# Patient Record
Sex: FEMALE | Race: WHITE | NOT HISPANIC OR LATINO | Employment: UNEMPLOYED | URBAN - METROPOLITAN AREA
[De-identification: names, ages, dates, MRNs, and addresses within clinical notes are randomized per-mention and may not be internally consistent; named-entity substitution may affect disease eponyms.]

---

## 2016-12-19 LAB
EXTERNAL CHLAMYDIA SCREEN: NEGATIVE
EXTERNAL GONORRHEA SCREEN: NEGATIVE

## 2017-01-16 ENCOUNTER — GENERIC CONVERSION - ENCOUNTER (OUTPATIENT)
Dept: OTHER | Facility: OTHER | Age: 27
End: 2017-01-16

## 2017-01-16 ENCOUNTER — ALLSCRIPTS OFFICE VISIT (OUTPATIENT)
Dept: OTHER | Facility: OTHER | Age: 27
End: 2017-01-16

## 2017-02-07 LAB
EXTERNAL ABO GROUPING: NORMAL
EXTERNAL ABO GROUPING: NORMAL
EXTERNAL ANTIBODY SCREEN: NORMAL
EXTERNAL ANTIBODY SCREEN: NORMAL
EXTERNAL HEMATOCRIT: 37.7 %
EXTERNAL HEMOGLOBIN: 12.7 G/DL
EXTERNAL HEPATITIS B SURFACE ANTIGEN: NORMAL
EXTERNAL PLATELET COUNT: 247 K/ΜL
EXTERNAL RH FACTOR: POSITIVE
EXTERNAL RH FACTOR: POSITIVE
EXTERNAL RUBELLA IGG QUANTITATION: NORMAL
EXTERNAL SYPHILIS RPR SCREEN: NORMAL

## 2017-02-08 ENCOUNTER — LAB CONVERSION - ENCOUNTER (OUTPATIENT)
Dept: OTHER | Facility: OTHER | Age: 27
End: 2017-02-08

## 2017-02-08 LAB
AB SCRN, RBC W/RFLX ID,TITER,AG (HISTORICAL): ABNORMAL
ABO GROUP BLD: ABNORMAL
BASOPHILS # BLD AUTO: 0.6 %
BASOPHILS # BLD AUTO: 76 CELLS/UL (ref 0–200)
DEPRECATED RDW RBC AUTO: 13.5 % (ref 11–15)
EOSINOPHIL # BLD AUTO: 2.2 %
EOSINOPHIL # BLD AUTO: 277 CELLS/UL (ref 15–500)
HCT VFR BLD AUTO: 37.7 % (ref 35–45)
HEPATITIS B SURFACE ANTIGEN (HISTORICAL): ABNORMAL
HGB BLD-MCNC: 12.7 G/DL (ref 11.7–15.5)
LYMPHOCYTES # BLD AUTO: 13.8 %
LYMPHOCYTES # BLD AUTO: 1739 CELLS/UL (ref 850–3900)
Lab: NORMAL
Lab: NORMAL
MCH RBC QN AUTO: 30.5 PG (ref 27–33)
MCHC RBC AUTO-ENTMCNC: 33.7 G/DL (ref 32–36)
MCV RBC AUTO: 90.6 FL (ref 80–100)
MONOCYTES # BLD AUTO: 781 CELLS/UL (ref 200–950)
MONOCYTES (HISTORICAL): 6.2 %
NEUTROPHILS # BLD AUTO: 77.2 %
NEUTROPHILS # BLD AUTO: 9727 CELLS/UL (ref 1500–7800)
PLATELET # BLD AUTO: 247 THOUSAND/UL (ref 140–400)
PMV BLD AUTO: 8.6 FL (ref 7.5–12.5)
RBC # BLD AUTO: 4.16 MILLION/UL (ref 3.8–5.1)
RH BLD: ABNORMAL
RPR SCREEN (HISTORICAL): ABNORMAL
RUBELLA, IGG (HISTORICAL): 3.8
VARICELLA ZOSTER IGG (HISTORICAL): 1.11 INDEX
WBC # BLD AUTO: 12.6 THOUSAND/UL (ref 3.8–10.8)

## 2017-02-14 ENCOUNTER — GENERIC CONVERSION - ENCOUNTER (OUTPATIENT)
Dept: OTHER | Facility: OTHER | Age: 27
End: 2017-02-14

## 2017-03-10 ENCOUNTER — ALLSCRIPTS OFFICE VISIT (OUTPATIENT)
Dept: OTHER | Facility: OTHER | Age: 27
End: 2017-03-10

## 2017-03-10 ENCOUNTER — GENERIC CONVERSION - ENCOUNTER (OUTPATIENT)
Dept: OTHER | Facility: OTHER | Age: 27
End: 2017-03-10

## 2017-04-10 ENCOUNTER — GENERIC CONVERSION - ENCOUNTER (OUTPATIENT)
Dept: OTHER | Facility: OTHER | Age: 27
End: 2017-04-10

## 2017-05-01 ENCOUNTER — ALLSCRIPTS OFFICE VISIT (OUTPATIENT)
Dept: OTHER | Facility: OTHER | Age: 27
End: 2017-05-01

## 2017-05-09 LAB
EXTERNAL SYPHILIS RPR SCREEN: NORMAL
GLUCOSE 1H P 50 G GLC PO SERPL-MCNC: 126 MG/DL (ref 70–183)

## 2017-05-10 ENCOUNTER — GENERIC CONVERSION - ENCOUNTER (OUTPATIENT)
Dept: OTHER | Facility: OTHER | Age: 27
End: 2017-05-10

## 2017-05-10 LAB
DEPRECATED RDW RBC AUTO: 13.2 % (ref 11–15)
GLUCOSE 1 HR 50 GM GLUC CHALLENGE-PREG PTS (HISTORICAL): 126 MG/DL
HCT VFR BLD AUTO: 37 % (ref 35–45)
HGB BLD-MCNC: 12.1 G/DL (ref 11.7–15.5)
MCH RBC QN AUTO: 29.2 PG (ref 27–33)
MCHC RBC AUTO-ENTMCNC: 32.8 G/DL (ref 32–36)
MCV RBC AUTO: 89 FL (ref 80–100)
PLATELET # BLD AUTO: 278 THOUSAND/UL (ref 140–400)
PMV BLD AUTO: 7.5 FL (ref 7.5–12.5)
RBC # BLD AUTO: 4.15 MILLION/UL (ref 3.8–5.1)
RPR SCREEN (HISTORICAL): NORMAL
WBC # BLD AUTO: 12.2 THOUSAND/UL (ref 3.8–10.8)

## 2017-05-15 ENCOUNTER — GENERIC CONVERSION - ENCOUNTER (OUTPATIENT)
Dept: OTHER | Facility: OTHER | Age: 27
End: 2017-05-15

## 2017-05-30 ENCOUNTER — GENERIC CONVERSION - ENCOUNTER (OUTPATIENT)
Dept: OTHER | Facility: OTHER | Age: 27
End: 2017-05-30

## 2017-06-12 ENCOUNTER — GENERIC CONVERSION - ENCOUNTER (OUTPATIENT)
Dept: OTHER | Facility: OTHER | Age: 27
End: 2017-06-12

## 2017-06-12 ENCOUNTER — ALLSCRIPTS OFFICE VISIT (OUTPATIENT)
Dept: OTHER | Facility: OTHER | Age: 27
End: 2017-06-12

## 2017-06-12 LAB — EXTERNAL GROUP B STREP ANTIGEN: NEGATIVE

## 2017-06-13 ENCOUNTER — GENERIC CONVERSION - ENCOUNTER (OUTPATIENT)
Dept: OTHER | Facility: OTHER | Age: 27
End: 2017-06-13

## 2017-06-14 LAB — CULTURE GENITAL-BSB ON (HISTORICAL): NORMAL

## 2017-06-15 ENCOUNTER — GENERIC CONVERSION - ENCOUNTER (OUTPATIENT)
Dept: OTHER | Facility: OTHER | Age: 27
End: 2017-06-15

## 2017-06-20 ENCOUNTER — GENERIC CONVERSION - ENCOUNTER (OUTPATIENT)
Dept: OTHER | Facility: OTHER | Age: 27
End: 2017-06-20

## 2017-06-28 ENCOUNTER — GENERIC CONVERSION - ENCOUNTER (OUTPATIENT)
Dept: OTHER | Facility: OTHER | Age: 27
End: 2017-06-28

## 2017-06-30 ENCOUNTER — ALLSCRIPTS OFFICE VISIT (OUTPATIENT)
Dept: OTHER | Facility: OTHER | Age: 27
End: 2017-06-30

## 2017-07-05 ENCOUNTER — GENERIC CONVERSION - ENCOUNTER (OUTPATIENT)
Dept: OTHER | Facility: OTHER | Age: 27
End: 2017-07-05

## 2017-07-12 ENCOUNTER — GENERIC CONVERSION - ENCOUNTER (OUTPATIENT)
Dept: OTHER | Facility: OTHER | Age: 27
End: 2017-07-12

## 2017-07-17 ENCOUNTER — ALLSCRIPTS OFFICE VISIT (OUTPATIENT)
Dept: OTHER | Facility: OTHER | Age: 27
End: 2017-07-17

## 2017-07-18 ENCOUNTER — GENERIC CONVERSION - ENCOUNTER (OUTPATIENT)
Dept: OTHER | Facility: OTHER | Age: 27
End: 2017-07-18

## 2017-07-21 ENCOUNTER — HOSPITAL ENCOUNTER (OUTPATIENT)
Facility: HOSPITAL | Age: 27
Discharge: HOME | End: 2017-07-22
Attending: OBSTETRICS & GYNECOLOGY | Admitting: OBSTETRICS & GYNECOLOGY

## 2017-07-21 VITALS
SYSTOLIC BLOOD PRESSURE: 136 MMHG | HEART RATE: 83 BPM | RESPIRATION RATE: 18 BRPM | TEMPERATURE: 97.8 F | DIASTOLIC BLOOD PRESSURE: 74 MMHG

## 2017-07-21 RX ORDER — PNV NO.95/FERROUS FUM/FOLIC AC 28MG-0.8MG
1 TABLET ORAL DAILY
COMMUNITY
End: 2018-03-14

## 2017-07-21 RX ORDER — HYDROXYZINE 50 MG/1
1 TABLET, FILM COATED ORAL EVERY 6 HOURS
COMMUNITY
Start: 2017-07-18 | End: 2018-03-14

## 2017-07-21 RX ORDER — CLOBETASOL PROPIONATE 0.5 MG/G
1 CREAM TOPICAL 2 TIMES DAILY
COMMUNITY
Start: 2017-07-18 | End: 2018-03-14

## 2017-07-22 ENCOUNTER — GENERIC CONVERSION - ENCOUNTER (OUTPATIENT)
Dept: OTHER | Facility: OTHER | Age: 27
End: 2017-07-22

## 2017-07-22 PROCEDURE — 99213 OFFICE O/P EST LOW 20 MIN: CPT

## 2017-07-22 RX ORDER — HYDROXYZINE HYDROCHLORIDE 25 MG/1
50 TABLET, FILM COATED ORAL ONCE AS NEEDED
Status: DISCONTINUED | OUTPATIENT
Start: 2017-07-22 | End: 2017-07-22 | Stop reason: HOSPADM

## 2017-07-23 ENCOUNTER — ANESTHESIA (INPATIENT)
Dept: LABOR AND DELIVERY | Facility: HOSPITAL | Age: 27
DRG: 540 | End: 2017-07-23
Payer: COMMERCIAL

## 2017-07-23 ENCOUNTER — HOSPITAL ENCOUNTER (INPATIENT)
Facility: HOSPITAL | Age: 27
LOS: 4 days | Discharge: HOME | DRG: 540 | End: 2017-07-27
Attending: OBSTETRICS & GYNECOLOGY | Admitting: OBSTETRICS & GYNECOLOGY
Payer: COMMERCIAL

## 2017-07-23 PROBLEM — Z3A.40 40 WEEKS GESTATION OF PREGNANCY: Status: ACTIVE | Noted: 2017-07-23

## 2017-07-23 PROBLEM — Z34.90 NORMAL PREGNANCY: Status: ACTIVE | Noted: 2017-07-23

## 2017-07-23 PROBLEM — O26.86: Status: ACTIVE | Noted: 2017-07-23

## 2017-07-23 PROBLEM — O26.00 EXCESSIVE WEIGHT GAIN DURING PREGNANCY: Status: ACTIVE | Noted: 2017-07-23

## 2017-07-23 LAB
ABO GROUP BLD: NORMAL
BLD GP AB SCN SERPL QL: NEGATIVE
ERYTHROCYTE [DISTWIDTH] IN BLOOD BY AUTOMATED COUNT: 15.3 % (ref 11.6–15.1)
HCT VFR BLD AUTO: 40.4 % (ref 34.8–46.1)
HGB BLD-MCNC: 13.8 G/DL (ref 11.5–15.4)
MCH RBC QN AUTO: 29.6 PG (ref 26.8–34.3)
MCHC RBC AUTO-ENTMCNC: 34.2 G/DL (ref 31.4–37.4)
MCV RBC AUTO: 87 FL (ref 82–98)
PLATELET # BLD AUTO: 281 THOUSANDS/UL (ref 149–390)
PMV BLD AUTO: 10.5 FL (ref 8.9–12.7)
RBC # BLD AUTO: 4.66 MILLION/UL (ref 3.81–5.12)
RH BLD: POSITIVE
SPECIMEN EXPIRATION DATE: NORMAL
WBC # BLD AUTO: 17.96 THOUSAND/UL (ref 4.31–10.16)

## 2017-07-23 PROCEDURE — 86901 BLOOD TYPING SEROLOGIC RH(D): CPT | Performed by: OBSTETRICS & GYNECOLOGY

## 2017-07-23 PROCEDURE — 86592 SYPHILIS TEST NON-TREP QUAL: CPT | Performed by: OBSTETRICS & GYNECOLOGY

## 2017-07-23 PROCEDURE — 86900 BLOOD TYPING SEROLOGIC ABO: CPT | Performed by: OBSTETRICS & GYNECOLOGY

## 2017-07-23 PROCEDURE — 99212 OFFICE O/P EST SF 10 MIN: CPT

## 2017-07-23 PROCEDURE — 86850 RBC ANTIBODY SCREEN: CPT | Performed by: OBSTETRICS & GYNECOLOGY

## 2017-07-23 PROCEDURE — 85027 COMPLETE CBC AUTOMATED: CPT | Performed by: OBSTETRICS & GYNECOLOGY

## 2017-07-23 RX ORDER — ONDANSETRON 2 MG/ML
4 INJECTION INTRAMUSCULAR; INTRAVENOUS EVERY 4 HOURS PRN
Status: DISCONTINUED | OUTPATIENT
Start: 2017-07-23 | End: 2017-07-27 | Stop reason: HOSPADM

## 2017-07-23 RX ORDER — SODIUM CHLORIDE, SODIUM LACTATE, POTASSIUM CHLORIDE, CALCIUM CHLORIDE 600; 310; 30; 20 MG/100ML; MG/100ML; MG/100ML; MG/100ML
125 INJECTION, SOLUTION INTRAVENOUS CONTINUOUS
Status: DISCONTINUED | OUTPATIENT
Start: 2017-07-23 | End: 2017-07-24

## 2017-07-23 RX ORDER — DIPHENHYDRAMINE HYDROCHLORIDE 50 MG/ML
25 INJECTION INTRAMUSCULAR; INTRAVENOUS EVERY 6 HOURS PRN
Status: DISCONTINUED | OUTPATIENT
Start: 2017-07-23 | End: 2017-07-26

## 2017-07-23 RX ORDER — LIDOCAINE HYDROCHLORIDE AND EPINEPHRINE 15; 5 MG/ML; UG/ML
INJECTION, SOLUTION EPIDURAL AS NEEDED
Status: DISCONTINUED | OUTPATIENT
Start: 2017-07-23 | End: 2017-07-24 | Stop reason: SURG

## 2017-07-23 RX ORDER — TERBUTALINE SULFATE 1 MG/ML
INJECTION, SOLUTION SUBCUTANEOUS
Status: COMPLETED
Start: 2017-07-23 | End: 2017-07-23

## 2017-07-23 RX ORDER — ONDANSETRON 2 MG/ML
4 INJECTION INTRAMUSCULAR; INTRAVENOUS EVERY 6 HOURS PRN
Status: DISCONTINUED | OUTPATIENT
Start: 2017-07-23 | End: 2017-07-24

## 2017-07-23 RX ADMIN — LIDOCAINE HYDROCHLORIDE AND EPINEPHRINE 5 ML: 15; 5 INJECTION, SOLUTION EPIDURAL at 19:12

## 2017-07-23 RX ADMIN — SODIUM CHLORIDE, SODIUM LACTATE, POTASSIUM CHLORIDE, AND CALCIUM CHLORIDE 999 ML/HR: .6; .31; .03; .02 INJECTION, SOLUTION INTRAVENOUS at 17:32

## 2017-07-23 RX ADMIN — SODIUM CHLORIDE, SODIUM LACTATE, POTASSIUM CHLORIDE, AND CALCIUM CHLORIDE 125 ML/HR: .6; .31; .03; .02 INJECTION, SOLUTION INTRAVENOUS at 18:30

## 2017-07-23 RX ADMIN — TERBUTALINE SULFATE 0.2 MG: 1 INJECTION, SOLUTION SUBCUTANEOUS at 19:47

## 2017-07-23 RX ADMIN — ROPIVACAINE HYDROCHLORIDE: 2 INJECTION, SOLUTION EPIDURAL; INFILTRATION at 19:22

## 2017-07-23 RX ADMIN — SODIUM CHLORIDE, SODIUM LACTATE, POTASSIUM CHLORIDE, AND CALCIUM CHLORIDE 999 ML/HR: .6; .31; .03; .02 INJECTION, SOLUTION INTRAVENOUS at 20:31

## 2017-07-24 ENCOUNTER — GENERIC CONVERSION - ENCOUNTER (OUTPATIENT)
Dept: OTHER | Facility: OTHER | Age: 27
End: 2017-07-24

## 2017-07-24 LAB
BASE EXCESS BLDCOA CALC-SCNC: -23 MMOL/L (ref 3–11)
BASE EXCESS BLDCOV CALC-SCNC: -16 MMOL/L (ref 1–9)
HCO3 BLDCOA-SCNC: 16.7 MMOL/L (ref 17.3–27.3)
HCO3 BLDCOV-SCNC: 16.7 MMOL/L (ref 12.2–28.6)
O2 CT VFR BLDCOA CALC: 3.3 ML/DL
OXYHGB MFR BLDCOA: 12.2 %
OXYHGB MFR BLDCOV: 54.7 %
PCO2 BLDCOA: 125.9 MM[HG] (ref 30–60)
PCO2 BLDCOV: 67.1 MM HG (ref 27–43)
PH BLDCOA: 6.74 [PH] (ref 7.23–7.43)
PH BLDCOV: 7.01 [PH] (ref 7.19–7.49)
PO2 BLDCOA: 18.7 MM HG (ref 5–25)
PO2 BLDCOV: 38.2 MM HG (ref 15–45)
RPR SER QL: NORMAL
SAO2 % BLDCOV: 15.8 ML/DL

## 2017-07-24 PROCEDURE — 82805 BLOOD GASES W/O2 SATURATION: CPT | Performed by: OBSTETRICS & GYNECOLOGY

## 2017-07-24 PROCEDURE — 88307 TISSUE EXAM BY PATHOLOGIST: CPT | Performed by: OBSTETRICS & GYNECOLOGY

## 2017-07-24 RX ORDER — KETOROLAC TROMETHAMINE 30 MG/ML
INJECTION, SOLUTION INTRAMUSCULAR; INTRAVENOUS AS NEEDED
Status: DISCONTINUED | OUTPATIENT
Start: 2017-07-24 | End: 2017-07-24 | Stop reason: SURG

## 2017-07-24 RX ORDER — FENTANYL CITRATE 50 UG/ML
INJECTION, SOLUTION INTRAMUSCULAR; INTRAVENOUS
Status: COMPLETED
Start: 2017-07-24 | End: 2017-07-24

## 2017-07-24 RX ORDER — ONDANSETRON 2 MG/ML
4 INJECTION INTRAMUSCULAR; INTRAVENOUS EVERY 4 HOURS PRN
Status: DISCONTINUED | OUTPATIENT
Start: 2017-07-24 | End: 2017-07-24 | Stop reason: SDUPTHER

## 2017-07-24 RX ORDER — FENTANYL CITRATE/PF 50 MCG/ML
50 SYRINGE (ML) INJECTION
Status: DISCONTINUED | OUTPATIENT
Start: 2017-07-24 | End: 2017-07-27 | Stop reason: HOSPADM

## 2017-07-24 RX ORDER — ONDANSETRON 2 MG/ML
4 INJECTION INTRAMUSCULAR; INTRAVENOUS EVERY 4 HOURS PRN
Status: ACTIVE | OUTPATIENT
Start: 2017-07-24 | End: 2017-07-25

## 2017-07-24 RX ORDER — DIPHENHYDRAMINE HYDROCHLORIDE 50 MG/ML
25 INJECTION INTRAMUSCULAR; INTRAVENOUS EVERY 6 HOURS PRN
Status: DISCONTINUED | OUTPATIENT
Start: 2017-07-24 | End: 2017-07-24 | Stop reason: SDUPTHER

## 2017-07-24 RX ORDER — OXYCODONE HYDROCHLORIDE 5 MG/1
5 TABLET ORAL EVERY 4 HOURS PRN
Status: ACTIVE | OUTPATIENT
Start: 2017-07-24 | End: 2017-07-25

## 2017-07-24 RX ORDER — DOCUSATE SODIUM 100 MG/1
100 CAPSULE, LIQUID FILLED ORAL 2 TIMES DAILY
Status: DISCONTINUED | OUTPATIENT
Start: 2017-07-24 | End: 2017-07-25

## 2017-07-24 RX ORDER — METOCLOPRAMIDE HYDROCHLORIDE 5 MG/ML
5 INJECTION INTRAMUSCULAR; INTRAVENOUS EVERY 6 HOURS PRN
Status: ACTIVE | OUTPATIENT
Start: 2017-07-24 | End: 2017-07-25

## 2017-07-24 RX ORDER — SODIUM CHLORIDE, SODIUM LACTATE, POTASSIUM CHLORIDE, CALCIUM CHLORIDE 600; 310; 30; 20 MG/100ML; MG/100ML; MG/100ML; MG/100ML
125 INJECTION, SOLUTION INTRAVENOUS CONTINUOUS
Status: DISCONTINUED | OUTPATIENT
Start: 2017-07-24 | End: 2017-07-27 | Stop reason: HOSPADM

## 2017-07-24 RX ORDER — METOCLOPRAMIDE HYDROCHLORIDE 5 MG/ML
5 INJECTION INTRAMUSCULAR; INTRAVENOUS EVERY 6 HOURS PRN
Status: DISCONTINUED | OUTPATIENT
Start: 2017-07-24 | End: 2017-07-24 | Stop reason: SDUPTHER

## 2017-07-24 RX ORDER — NALBUPHINE HCL 10 MG/ML
5 AMPUL (ML) INJECTION
Status: ACTIVE | OUTPATIENT
Start: 2017-07-24 | End: 2017-07-25

## 2017-07-24 RX ORDER — ONDANSETRON 2 MG/ML
4 INJECTION INTRAMUSCULAR; INTRAVENOUS EVERY 8 HOURS PRN
Status: DISCONTINUED | OUTPATIENT
Start: 2017-07-25 | End: 2017-07-27 | Stop reason: HOSPADM

## 2017-07-24 RX ORDER — OXYCODONE HYDROCHLORIDE AND ACETAMINOPHEN 5; 325 MG/1; MG/1
1 TABLET ORAL EVERY 4 HOURS PRN
Status: DISCONTINUED | OUTPATIENT
Start: 2017-07-25 | End: 2017-07-27 | Stop reason: HOSPADM

## 2017-07-24 RX ORDER — MEPERIDINE HYDROCHLORIDE 50 MG/ML
12.5 INJECTION INTRAMUSCULAR; INTRAVENOUS; SUBCUTANEOUS AS NEEDED
Status: DISCONTINUED | OUTPATIENT
Start: 2017-07-24 | End: 2017-07-27 | Stop reason: HOSPADM

## 2017-07-24 RX ORDER — MORPHINE SULFATE 2 MG/ML
2 INJECTION, SOLUTION INTRAMUSCULAR; INTRAVENOUS
Status: DISCONTINUED | OUTPATIENT
Start: 2017-07-24 | End: 2017-07-27 | Stop reason: HOSPADM

## 2017-07-24 RX ORDER — METHYLERGONOVINE MALEATE 0.2 MG/ML
INJECTION INTRAVENOUS
Status: DISPENSED
Start: 2017-07-24 | End: 2017-07-24

## 2017-07-24 RX ORDER — SODIUM CHLORIDE 9 MG/ML
INJECTION, SOLUTION INTRAVENOUS CONTINUOUS PRN
Status: DISCONTINUED | OUTPATIENT
Start: 2017-07-24 | End: 2017-07-24 | Stop reason: SURG

## 2017-07-24 RX ORDER — MORPHINE SULFATE 1 MG/ML
INJECTION, SOLUTION EPIDURAL; INTRATHECAL; INTRAVENOUS AS NEEDED
Status: DISCONTINUED | OUTPATIENT
Start: 2017-07-24 | End: 2017-07-24 | Stop reason: SURG

## 2017-07-24 RX ORDER — CEFAZOLIN SODIUM 1 G/3ML
INJECTION, POWDER, FOR SOLUTION INTRAMUSCULAR; INTRAVENOUS AS NEEDED
Status: DISCONTINUED | OUTPATIENT
Start: 2017-07-24 | End: 2017-07-24 | Stop reason: SURG

## 2017-07-24 RX ORDER — OXYCODONE HYDROCHLORIDE AND ACETAMINOPHEN 5; 325 MG/1; MG/1
2 TABLET ORAL EVERY 4 HOURS PRN
Status: DISCONTINUED | OUTPATIENT
Start: 2017-07-25 | End: 2017-07-27 | Stop reason: HOSPADM

## 2017-07-24 RX ORDER — HYDROXYZINE HYDROCHLORIDE 25 MG/1
50 TABLET, FILM COATED ORAL EVERY 6 HOURS
Status: DISCONTINUED | OUTPATIENT
Start: 2017-07-24 | End: 2017-07-24

## 2017-07-24 RX ORDER — OXYTOCIN/RINGER'S LACTATE 30/500 ML
PLASTIC BAG, INJECTION (ML) INTRAVENOUS
Status: COMPLETED
Start: 2017-07-24 | End: 2017-07-24

## 2017-07-24 RX ORDER — DIPHENHYDRAMINE HCL 25 MG
25 TABLET ORAL EVERY 6 HOURS PRN
Status: DISCONTINUED | OUTPATIENT
Start: 2017-07-24 | End: 2017-07-27 | Stop reason: HOSPADM

## 2017-07-24 RX ORDER — IBUPROFEN 600 MG/1
600 TABLET ORAL EVERY 6 HOURS PRN
Status: DISCONTINUED | OUTPATIENT
Start: 2017-07-24 | End: 2017-07-27 | Stop reason: HOSPADM

## 2017-07-24 RX ORDER — DIAPER,BRIEF,INFANT-TODD,DISP
1 EACH MISCELLANEOUS DAILY PRN
Status: DISCONTINUED | OUTPATIENT
Start: 2017-07-24 | End: 2017-07-27 | Stop reason: HOSPADM

## 2017-07-24 RX ORDER — DIPHENHYDRAMINE HYDROCHLORIDE 50 MG/ML
25 INJECTION INTRAMUSCULAR; INTRAVENOUS EVERY 6 HOURS PRN
Status: ACTIVE | OUTPATIENT
Start: 2017-07-24 | End: 2017-07-25

## 2017-07-24 RX ORDER — FENTANYL CITRATE 50 UG/ML
INJECTION, SOLUTION INTRAMUSCULAR; INTRAVENOUS AS NEEDED
Status: DISCONTINUED | OUTPATIENT
Start: 2017-07-24 | End: 2017-07-24 | Stop reason: SURG

## 2017-07-24 RX ORDER — KETOROLAC TROMETHAMINE 30 MG/ML
30 INJECTION, SOLUTION INTRAMUSCULAR; INTRAVENOUS EVERY 6 HOURS
Status: COMPLETED | OUTPATIENT
Start: 2017-07-24 | End: 2017-07-25

## 2017-07-24 RX ORDER — NALOXONE HYDROCHLORIDE 0.4 MG/ML
0.1 INJECTION, SOLUTION INTRAMUSCULAR; INTRAVENOUS; SUBCUTANEOUS
Status: ACTIVE | OUTPATIENT
Start: 2017-07-24 | End: 2017-07-25

## 2017-07-24 RX ORDER — ACETAMINOPHEN 325 MG/1
650 TABLET ORAL EVERY 6 HOURS PRN
Status: DISCONTINUED | OUTPATIENT
Start: 2017-07-24 | End: 2017-07-27 | Stop reason: HOSPADM

## 2017-07-24 RX ORDER — NALBUPHINE HCL 10 MG/ML
5 AMPUL (ML) INJECTION
Status: DISCONTINUED | OUTPATIENT
Start: 2017-07-24 | End: 2017-07-24 | Stop reason: SDUPTHER

## 2017-07-24 RX ORDER — CHLOROPROCAINE HYDROCHLORIDE 30 MG/ML
INJECTION, SOLUTION EPIDURAL; INFILTRATION; INTRACAUDAL; PERINEURAL AS NEEDED
Status: DISCONTINUED | OUTPATIENT
Start: 2017-07-24 | End: 2017-07-24 | Stop reason: SURG

## 2017-07-24 RX ORDER — MORPHINE SULFATE 1 MG/ML
INJECTION, SOLUTION EPIDURAL; INTRATHECAL; INTRAVENOUS
Status: DISPENSED
Start: 2017-07-24 | End: 2017-07-24

## 2017-07-24 RX ORDER — OXYTOCIN/RINGER'S LACTATE 30/500 ML
62.5 PLASTIC BAG, INJECTION (ML) INTRAVENOUS ONCE
Status: COMPLETED | OUTPATIENT
Start: 2017-07-24 | End: 2017-07-24

## 2017-07-24 RX ORDER — PROPOFOL 10 MG/ML
INJECTION, EMULSION INTRAVENOUS AS NEEDED
Status: DISCONTINUED | OUTPATIENT
Start: 2017-07-24 | End: 2017-07-24 | Stop reason: SURG

## 2017-07-24 RX ADMIN — KETOROLAC TROMETHAMINE 30 MG: 30 INJECTION, SOLUTION INTRAMUSCULAR at 06:27

## 2017-07-24 RX ADMIN — FENTANYL CITRATE 50 MCG: 50 INJECTION INTRAMUSCULAR; INTRAVENOUS at 07:45

## 2017-07-24 RX ADMIN — Medication 62.5 MILLI-UNITS/MIN: at 07:30

## 2017-07-24 RX ADMIN — Medication 50 MCG: at 07:20

## 2017-07-24 RX ADMIN — KETOROLAC TROMETHAMINE 30 MG: 30 INJECTION, SOLUTION INTRAMUSCULAR at 18:42

## 2017-07-24 RX ADMIN — Medication 50 MCG: at 07:45

## 2017-07-24 RX ADMIN — SODIUM CHLORIDE: 0.9 INJECTION, SOLUTION INTRAVENOUS at 06:09

## 2017-07-24 RX ADMIN — ROPIVACAINE HYDROCHLORIDE: 2 INJECTION, SOLUTION EPIDURAL; INFILTRATION at 03:05

## 2017-07-24 RX ADMIN — MORPHINE SULFATE 3 MG: 1 INJECTION, SOLUTION EPIDURAL; INTRATHECAL; INTRAVENOUS at 06:37

## 2017-07-24 RX ADMIN — KETOROLAC TROMETHAMINE 30 MG: 30 INJECTION, SOLUTION INTRAMUSCULAR at 13:43

## 2017-07-24 RX ADMIN — OXYTOCIN 250 MILLI-UNITS/MIN: 10 INJECTION, SOLUTION INTRAMUSCULAR; INTRAVENOUS at 06:01

## 2017-07-24 RX ADMIN — CHLOROPROCAINE HYDROCHLORIDE 5 ML: 30 INJECTION, SOLUTION EPIDURAL; INFILTRATION at 06:16

## 2017-07-24 RX ADMIN — DOCUSATE SODIUM 100 MG: 100 CAPSULE, LIQUID FILLED ORAL at 18:42

## 2017-07-24 RX ADMIN — AZITHROMYCIN MONOHYDRATE 500 MG: 500 INJECTION, POWDER, LYOPHILIZED, FOR SOLUTION INTRAVENOUS at 10:47

## 2017-07-24 RX ADMIN — MORPHINE SULFATE 3 MG: 1 INJECTION, SOLUTION EPIDURAL; INTRATHECAL; INTRAVENOUS at 06:26

## 2017-07-24 RX ADMIN — FENTANYL CITRATE 50 MCG: 50 INJECTION INTRAMUSCULAR; INTRAVENOUS at 07:20

## 2017-07-24 RX ADMIN — MORPHINE SULFATE 4 MG: 1 INJECTION, SOLUTION EPIDURAL; INTRATHECAL; INTRAVENOUS at 06:29

## 2017-07-24 RX ADMIN — ONDANSETRON HYDROCHLORIDE 4 MG: 2 INJECTION, SOLUTION INTRAVENOUS at 06:27

## 2017-07-24 RX ADMIN — CHLOROPROCAINE HYDROCHLORIDE 15 ML: 30 INJECTION, SOLUTION EPIDURAL; INFILTRATION at 05:29

## 2017-07-24 RX ADMIN — PROPOFOL 50 MG: 10 INJECTION, EMULSION INTRAVENOUS at 05:58

## 2017-07-24 RX ADMIN — SODIUM CHLORIDE, SODIUM LACTATE, POTASSIUM CHLORIDE, AND CALCIUM CHLORIDE 125 ML/HR: .6; .31; .03; .02 INJECTION, SOLUTION INTRAVENOUS at 04:10

## 2017-07-24 RX ADMIN — SODIUM CHLORIDE, SODIUM LACTATE, POTASSIUM CHLORIDE, AND CALCIUM CHLORIDE 125 ML/HR: .6; .31; .03; .02 INJECTION, SOLUTION INTRAVENOUS at 17:20

## 2017-07-24 RX ADMIN — CHLOROPROCAINE HYDROCHLORIDE 10 ML: 30 INJECTION, SOLUTION EPIDURAL; INFILTRATION at 06:01

## 2017-07-24 RX ADMIN — SODIUM CHLORIDE, SODIUM LACTATE, POTASSIUM CHLORIDE, AND CALCIUM CHLORIDE 125 ML/HR: .6; .31; .03; .02 INJECTION, SOLUTION INTRAVENOUS at 08:16

## 2017-07-24 RX ADMIN — FENTANYL CITRATE 100 MCG: 50 INJECTION, SOLUTION INTRAMUSCULAR; INTRAVENOUS at 05:57

## 2017-07-24 RX ADMIN — CEFAZOLIN 2000 MG: 1 INJECTION, POWDER, FOR SOLUTION INTRAVENOUS at 05:52

## 2017-07-25 LAB
BASOPHILS # BLD AUTO: 0.03 THOUSANDS/ΜL (ref 0–0.1)
BASOPHILS NFR BLD AUTO: 0 % (ref 0–1)
EOSINOPHIL # BLD AUTO: 0.58 THOUSAND/ΜL (ref 0–0.61)
EOSINOPHIL NFR BLD AUTO: 3 % (ref 0–6)
ERYTHROCYTE [DISTWIDTH] IN BLOOD BY AUTOMATED COUNT: 15.7 % (ref 11.6–15.1)
HCT VFR BLD AUTO: 29.2 % (ref 34.8–46.1)
HGB BLD-MCNC: 9.8 G/DL (ref 11.5–15.4)
LYMPHOCYTES # BLD AUTO: 2.24 THOUSANDS/ΜL (ref 0.6–4.47)
LYMPHOCYTES NFR BLD AUTO: 11 % (ref 14–44)
MCH RBC QN AUTO: 29.3 PG (ref 26.8–34.3)
MCHC RBC AUTO-ENTMCNC: 33.6 G/DL (ref 31.4–37.4)
MCV RBC AUTO: 87 FL (ref 82–98)
MONOCYTES # BLD AUTO: 1.34 THOUSAND/ΜL (ref 0.17–1.22)
MONOCYTES NFR BLD AUTO: 7 % (ref 4–12)
NEUTROPHILS # BLD AUTO: 16.31 THOUSANDS/ΜL (ref 1.85–7.62)
NEUTS SEG NFR BLD AUTO: 79 % (ref 43–75)
NRBC BLD AUTO-RTO: 0 /100 WBCS
PLATELET # BLD AUTO: 233 THOUSANDS/UL (ref 149–390)
PMV BLD AUTO: 10.1 FL (ref 8.9–12.7)
RBC # BLD AUTO: 3.35 MILLION/UL (ref 3.81–5.12)
WBC # BLD AUTO: 20.5 THOUSAND/UL (ref 4.31–10.16)

## 2017-07-25 PROCEDURE — 85025 COMPLETE CBC W/AUTO DIFF WBC: CPT | Performed by: OBSTETRICS & GYNECOLOGY

## 2017-07-25 RX ORDER — SENNOSIDES 8.6 MG
1 TABLET ORAL
Status: DISCONTINUED | OUTPATIENT
Start: 2017-07-25 | End: 2017-07-27 | Stop reason: HOSPADM

## 2017-07-25 RX ADMIN — KETOROLAC TROMETHAMINE 30 MG: 30 INJECTION, SOLUTION INTRAMUSCULAR at 09:56

## 2017-07-25 RX ADMIN — HYDROMORPHONE HYDROCHLORIDE 0.5 MG: 1 INJECTION, SOLUTION INTRAMUSCULAR; INTRAVENOUS; SUBCUTANEOUS at 07:05

## 2017-07-25 RX ADMIN — HYDROCORTISONE 1 APPLICATION: 1 CREAM TOPICAL at 18:09

## 2017-07-25 RX ADMIN — DOCUSATE SODIUM 100 MG: 100 CAPSULE, LIQUID FILLED ORAL at 09:56

## 2017-07-25 RX ADMIN — OXYCODONE HYDROCHLORIDE AND ACETAMINOPHEN 2 TABLET: 5; 325 TABLET ORAL at 20:11

## 2017-07-25 RX ADMIN — KETOROLAC TROMETHAMINE 30 MG: 30 INJECTION, SOLUTION INTRAMUSCULAR at 02:59

## 2017-07-25 RX ADMIN — Medication 1 TABLET: at 09:56

## 2017-07-25 RX ADMIN — OXYCODONE HYDROCHLORIDE AND ACETAMINOPHEN 2 TABLET: 5; 325 TABLET ORAL at 12:37

## 2017-07-25 RX ADMIN — DIPHENHYDRAMINE HCL 25 MG: 25 TABLET ORAL at 18:09

## 2017-07-26 LAB
BASOPHILS # BLD AUTO: 0.03 THOUSANDS/ΜL (ref 0–0.1)
BASOPHILS NFR BLD AUTO: 0 % (ref 0–1)
EOSINOPHIL # BLD AUTO: 0.73 THOUSAND/ΜL (ref 0–0.61)
EOSINOPHIL NFR BLD AUTO: 5 % (ref 0–6)
ERYTHROCYTE [DISTWIDTH] IN BLOOD BY AUTOMATED COUNT: 15.4 % (ref 11.6–15.1)
HCT VFR BLD AUTO: 29.9 % (ref 34.8–46.1)
HGB BLD-MCNC: 10 G/DL (ref 11.5–15.4)
LYMPHOCYTES # BLD AUTO: 2.04 THOUSANDS/ΜL (ref 0.6–4.47)
LYMPHOCYTES NFR BLD AUTO: 14 % (ref 14–44)
MCH RBC QN AUTO: 29.3 PG (ref 26.8–34.3)
MCHC RBC AUTO-ENTMCNC: 33.4 G/DL (ref 31.4–37.4)
MCV RBC AUTO: 88 FL (ref 82–98)
MONOCYTES # BLD AUTO: 0.64 THOUSAND/ΜL (ref 0.17–1.22)
MONOCYTES NFR BLD AUTO: 4 % (ref 4–12)
NEUTROPHILS # BLD AUTO: 11.54 THOUSANDS/ΜL (ref 1.85–7.62)
NEUTS SEG NFR BLD AUTO: 77 % (ref 43–75)
NRBC BLD AUTO-RTO: 0 /100 WBCS
PLATELET # BLD AUTO: 289 THOUSANDS/UL (ref 149–390)
PMV BLD AUTO: 9.4 FL (ref 8.9–12.7)
RBC # BLD AUTO: 3.41 MILLION/UL (ref 3.81–5.12)
WBC # BLD AUTO: 14.98 THOUSAND/UL (ref 4.31–10.16)

## 2017-07-26 PROCEDURE — 85025 COMPLETE CBC W/AUTO DIFF WBC: CPT | Performed by: OBSTETRICS & GYNECOLOGY

## 2017-07-26 RX ORDER — LORATADINE 10 MG/1
10 TABLET ORAL 2 TIMES DAILY
Status: DISCONTINUED | OUTPATIENT
Start: 2017-07-26 | End: 2017-07-27 | Stop reason: HOSPADM

## 2017-07-26 RX ORDER — LORAZEPAM 0.5 MG/1
0.5 TABLET ORAL ONCE
Status: COMPLETED | OUTPATIENT
Start: 2017-07-26 | End: 2017-07-26

## 2017-07-26 RX ADMIN — DIPHENHYDRAMINE HCL 25 MG: 25 TABLET ORAL at 15:41

## 2017-07-26 RX ADMIN — LORAZEPAM 0.5 MG: 0.5 TABLET ORAL at 16:07

## 2017-07-26 RX ADMIN — Medication 1 TABLET: at 07:41

## 2017-07-26 RX ADMIN — IBUPROFEN 600 MG: 600 TABLET, FILM COATED ORAL at 13:42

## 2017-07-26 RX ADMIN — IBUPROFEN 600 MG: 600 TABLET, FILM COATED ORAL at 07:38

## 2017-07-26 RX ADMIN — OXYCODONE HYDROCHLORIDE AND ACETAMINOPHEN 2 TABLET: 5; 325 TABLET ORAL at 13:42

## 2017-07-26 RX ADMIN — SENNOSIDES 8.6 MG: 8.6 TABLET, FILM COATED ORAL at 02:10

## 2017-07-26 RX ADMIN — IBUPROFEN 600 MG: 600 TABLET, FILM COATED ORAL at 21:25

## 2017-07-26 RX ADMIN — DIPHENHYDRAMINE HCL 25 MG: 25 TABLET ORAL at 05:40

## 2017-07-26 RX ADMIN — LORATADINE 10 MG: 10 TABLET ORAL at 18:43

## 2017-07-26 RX ADMIN — OXYCODONE HYDROCHLORIDE AND ACETAMINOPHEN 2 TABLET: 5; 325 TABLET ORAL at 01:57

## 2017-07-26 RX ADMIN — OXYCODONE HYDROCHLORIDE AND ACETAMINOPHEN 2 TABLET: 5; 325 TABLET ORAL at 07:38

## 2017-07-26 RX ADMIN — SENNOSIDES 8.6 MG: 8.6 TABLET, FILM COATED ORAL at 21:24

## 2017-07-26 RX ADMIN — OXYCODONE HYDROCHLORIDE AND ACETAMINOPHEN 2 TABLET: 5; 325 TABLET ORAL at 21:24

## 2017-07-27 VITALS
OXYGEN SATURATION: 98 % | DIASTOLIC BLOOD PRESSURE: 80 MMHG | RESPIRATION RATE: 18 BRPM | SYSTOLIC BLOOD PRESSURE: 129 MMHG | TEMPERATURE: 98.4 F | HEART RATE: 104 BPM

## 2017-07-27 PROCEDURE — 4A1HXCZ MONITORING OF PRODUCTS OF CONCEPTION, CARDIAC RATE, EXTERNAL APPROACH: ICD-10-PCS | Performed by: OBSTETRICS & GYNECOLOGY

## 2017-07-27 RX ORDER — HYDROMORPHONE HYDROCHLORIDE 2 MG/1
TABLET ORAL
Qty: 30 TABLET | Refills: 0
Start: 2017-07-27 | End: 2018-03-14

## 2017-07-27 RX ORDER — LORATADINE 10 MG/1
10 TABLET ORAL 2 TIMES DAILY
Refills: 0
Start: 2017-07-27 | End: 2018-03-14

## 2017-07-27 RX ORDER — IBUPROFEN 600 MG/1
600 TABLET ORAL EVERY 6 HOURS PRN
Qty: 30 TABLET | Refills: 0
Start: 2017-07-27 | End: 2018-03-14

## 2017-07-27 RX ADMIN — IBUPROFEN 600 MG: 600 TABLET, FILM COATED ORAL at 16:58

## 2017-07-27 RX ADMIN — OXYCODONE HYDROCHLORIDE AND ACETAMINOPHEN 1 TABLET: 5; 325 TABLET ORAL at 16:59

## 2017-07-27 RX ADMIN — IBUPROFEN 600 MG: 600 TABLET, FILM COATED ORAL at 03:30

## 2017-07-27 RX ADMIN — Medication 1 TABLET: at 09:27

## 2017-07-27 RX ADMIN — LORATADINE 10 MG: 10 TABLET ORAL at 09:28

## 2017-07-27 RX ADMIN — IBUPROFEN 600 MG: 600 TABLET, FILM COATED ORAL at 09:31

## 2017-08-09 ENCOUNTER — ALLSCRIPTS OFFICE VISIT (OUTPATIENT)
Dept: OTHER | Facility: OTHER | Age: 27
End: 2017-08-09

## 2017-08-22 ENCOUNTER — GENERIC CONVERSION - ENCOUNTER (OUTPATIENT)
Dept: OTHER | Facility: OTHER | Age: 27
End: 2017-08-22

## 2017-08-23 ENCOUNTER — ALLSCRIPTS OFFICE VISIT (OUTPATIENT)
Dept: OTHER | Facility: OTHER | Age: 27
End: 2017-08-23

## 2017-08-28 ENCOUNTER — OFFICE VISIT (OUTPATIENT)
Dept: URGENT CARE | Age: 27
End: 2017-08-28

## 2017-08-28 PROCEDURE — G0381 LEV 2 HOSP TYPE B ED VISIT: HCPCS | Performed by: FAMILY MEDICINE

## 2017-09-19 ENCOUNTER — GENERIC CONVERSION - ENCOUNTER (OUTPATIENT)
Dept: OTHER | Facility: OTHER | Age: 27
End: 2017-09-19

## 2018-01-10 NOTE — MISCELLANEOUS
Message  Message Free Text Note Form: 7/21/17 2050: pt's  calls noting clot passed per vagina and contractions  Bleeding is more watery than gooey   Advised to present to L&D at [facility] for eval  BEO      Signatures   Electronically signed by : Electa Cheadle, M D ; Jul 22 2017 11:05AM EST                       (Author)

## 2018-01-10 NOTE — MISCELLANEOUS
Message  8/22/17 1115: pt's friend Bessy Levine, returns call re pt's increased pruritus  Pt is taking claritin QOD  Hygiene products are basic: tide, Argon [place] shampoo  Encouraged claritin BID, rx for atarax sent in ([facility] supports)  Pt given appt for tomorrow 1100  may need derm input   BEO      Plan  Unlinked    · From  HydrOXYzine HCl - 50 MG Oral Tablet TAKE 1 TABLET Every 6 hours To  HydrOXYzine HCl - 50 MG Oral Tablet TAKE 1 TABLET 3 TIMES DAILY AS NEEDED  FOR ITCHING    Signatures   Electronically signed by : THONY Padilla ; Aug 22 2017 11:32AM EST                       (Author)

## 2018-01-10 NOTE — RESULT NOTES
Verified Results  (Q) STREPTOCOCCUS, GROUP B CULTURE 12Jun2017 12:00AM Ayden Levels     Test Name Result Flag Reference   STREPTOCOCCUS, GROUP B$CULTURE      STREPTOCOCCUS, GROUP B CULTURE         MICRO NUMBER:      [id number]    TEST STATUS:       FINAL    SPECIMEN SOURCE:   NOT GIVEN    SPECIMEN QUALITY:  ADEQUATE    RESULT:            No group B Streptococcus isolated

## 2018-01-12 VITALS
BODY MASS INDEX: 28.35 KG/M2 | DIASTOLIC BLOOD PRESSURE: 72 MMHG | HEIGHT: 63 IN | SYSTOLIC BLOOD PRESSURE: 110 MMHG | WEIGHT: 160 LBS

## 2018-01-13 NOTE — MISCELLANEOUS
Message  Message Free Text Note Form: 7/23/17 1545: ctx's now Q 5 minutes with some bleeding   Advised to proceed to L&D at [facility] for eval  BEO      Signatures   Electronically signed by : THONY Hennessy ; Jul 24 2017  8:25PM EST                       (Author)

## 2018-01-13 NOTE — PROGRESS NOTES
Chief Complaint  Pt  presents office for walk in HCG test  LMP 10/15/2016  Home tests positive  in office test positive  Currently 6w 5d with an ALEJANDRA 07/22/2017  pt  given a new ob packet and scheduled her new ob appt  David Parker start taking OTC prenatal vitamins  Active Problems    1  Menstruation, suppression (626 8) (N94 89)    Vitals  Signs    Weight: 121 lb LMP: [id number]    Assessment    1  Menstruation, suppression (626 8) (N94 89)    Plan     Menstruation, suppression    · Urine HCG- POC; Status:Active - Perform Order; Requested for:[id number];     Future Appointments    Date/Time Provider Specialty Site   12/07/2016 08:00 AM THONY Faria   Obstetrics/Gynecology [address] OB/GYN     Signatures   Electronically signed by : Arnell Libman, M D ; Dec  2 2016 11:33AM EST                       (Author)

## 2018-01-14 VITALS
HEIGHT: 63 IN | SYSTOLIC BLOOD PRESSURE: 114 MMHG | WEIGHT: 153 LBS | DIASTOLIC BLOOD PRESSURE: 60 MMHG | BODY MASS INDEX: 27.11 KG/M2

## 2018-01-14 VITALS
SYSTOLIC BLOOD PRESSURE: 122 MMHG | DIASTOLIC BLOOD PRESSURE: 70 MMHG | BODY MASS INDEX: 27.11 KG/M2 | HEIGHT: 63 IN | WEIGHT: 153 LBS

## 2018-01-15 VITALS
WEIGHT: 174 LBS | DIASTOLIC BLOOD PRESSURE: 92 MMHG | HEIGHT: 63 IN | SYSTOLIC BLOOD PRESSURE: 130 MMHG | BODY MASS INDEX: 30.83 KG/M2

## 2018-01-15 NOTE — PROGRESS NOTES
Assessment    1  PUPPP (pruritic urticarial papules and plaques of pregnancy) (346 80,709 8) (O26 86)    Plan  PUPPP (pruritic urticarial papules and plaques of pregnancy)    · Clobetasol Propionate 0 05 % External Cream; APPLY SPARINGLY TO  AFFECTED AREA(S) TWICE DAILYfor two weeks   Rx By: Yessy Moore; Dispense: 0 Days ; #:1 X 60 GM Tube; Refill: 0; For: PUPPP (pruritic urticarial papules and plaques of pregnancy); AMANDO = N; Verified Transmission to [facility]/PHARMACY# [id number]; Last Updated By: [employer]; 2017 12:02:55 PM    Discussion/Summary  Discussion Summary:   Increase claritin to BID, restart atarax, switch to clobetosol cream to affected areas  IF these do not reduce symptoms may need oral steroid  Consider derm consult if persists  Goals and Barriers: As above  The patent has the current Barriers: Language  Patient's Capacity to Self-Care: Patient is able to Self-Care  History of Present Illness  HPI: Pt is a 26 yo  at 39+2 wks EGA who presents urgently c/o sudden onset abdominal pain, burning and pruritus  Pt has noted mild itching back in May and encouraged benadryl, hypoallergenic hygiene and laundry materials  Pt states she has not changed any of her hygiene products across the pregnancy  Pt is quite uncomfortable: cannot sleep, poor appetite  Review of Systems   Female ROS: thinking she may be pregnant, but no pelvic pain, no nonmenstrual bleeding and no dysuria  Focused-Female:   Constitutional: no fever and no chills  ENT: no hoarseness  Cardiovascular: the heart rate was not fast    Respiratory: no shortness of breath  Breasts: no breast pain  Gastrointestinal: no nausea and no vomiting  Genitourinary: no dysuria and no pelvic pain  Integumentary: rash and itching  Neurological: no headache  ROS Reviewed:   ROS reviewed  Active Problems    1  Previous  delivery, delivered (794 21) (O34 219)   2   PUPPP (pruritic urticarial papules and plaques of pregnancy) (646 80,709 8) (O26 86)    Past Medical History    1  Denied: History of abnormal cervical Pap smear   2  Denied: History of herpes simplex infection   3  History of pregnancy (V13 29)   4  History of Immune to varicella (V49 89) (Z78 9)   5  History of Menarche (V21 8)  Active Problems And Past Medical History Reviewed: The active problems and past medical history were reviewed and updated today  Surgical History    1  History of  Section Low Transverse   2  History of Oral Surgery Tooth Extraction  Surgical History Reviewed: The surgical history was reviewed and updated today  Family History  Mother    1  Family history of Back pain, chronic   2  Family history of arthritis (V17 7) (Z82 61)   3  Family history of hypercholesterolemia (V18 19) (Z83 42)   4  Family history of hypertension (V17 49) (Z82 49)   5  Family history of type 2 diabetes mellitus (V18 0) (Z83 3)  Father    10  Family history of hypercholesterolemia (V18 19) (Z83 42)  Sister    9  Family history of type 2 diabetes mellitus (V18 0) (Z83 3)  Maternal Grandfather    8  Family history of lung cancer (V16 1) (Z80 1)  Paternal Grandfather    5  Family history of lung cancer (V16 1) (Z80 1)  Other    10  Family history of leukemia (V16 6) (Z80 6)    Social History    · College graduate   · Denied: History of Exercise habits   · Denied: History of drug use   · Housewife or homemaker   · 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 ([employer])   ·    · Never a smoker   · No alcohol use  Social History Reviewed: The social history was reviewed and updated today  Current Meds   1  Calcium 500 + D TABS; Therapy: (Recorded:2017) to Recorded   2  Clobetasol Propionate 0 05 % External Cream; APPLY SPARINGLY TO AFFECTED   AREA(S) TWICE DAILYfor two weeks; Therapy: 28TTF0277 to (Last Rx:65Ycj2723)  Requested for: 93MUC8894 Ordered   3   HydrOXYzine HCl - 50 MG Oral Tablet; TAKE 1 TABLET 3 TIMES DAILY AS NEEDED FOR   ITCHING; Therapy: 22JRS9740-  Requested for: 90Cxw0106; Last Rx:07Bzb3870; Status: NEED   INFORMATION - Problem Ordered   4  Loratadine 10 MG Oral Tablet; TAKE 1 TABLET Twice daily PRN Itching; Therapy: 90YTD6804 to (Last Rx:77Sym2197) Ordered   5  Multi Prenatal TABS; Therapy: (Recorded:41Ike8493) to Recorded   6  Omega-3 CAPS; Therapy: (Recorded:10Mar2017) to Recorded  Medication List Reviewed: The medication list was reviewed and updated today  Allergies    1  No Known Drug Allergies    Vitals  Vital Signs    Recorded: 07PYX4796 88:88VT   Systolic 773   Diastolic 60   Height 5 ft 3 in   Weight 152 lb    BMI Calculated 26 93   BSA Calculated 1 72     Physical Exam    Constitutional   General appearance: No acute distress, well appearing and well nourished  Neck -JVD  Pulmonary   Respiratory effort: No increased work of breathing or signs of respiratory distress  Abdomen   Abdomen: Abnormal   pink papular rash across hips and above areola of breasts  Excoriations oer upper arms and BLE  Skin   Skin and subcutaneous tissue: Abnormal   as above  Psychiatric   Orientation to person, place, and time: Normal   anxious  Future Appointments    Date/Time Provider Specialty Site   09/19/2017 01:00 PM THONY Saenz   Obstetrics/Gynecology [address] OB/GYN     Signatures   Electronically signed by : THONY Bhagat ; Aug 23 2017 12:08PM EST                       (Author)

## 2018-01-15 NOTE — PROGRESS NOTES
Active Problems    1  Encounter for supervision of normal first pregnancy in first trimester (V22 0) (Z34 01)   2  Excess weight gain in pregnancy, third trimester (901 43,180 5) (O26 03)   3  Nausea/vomiting in pregnancy (643 90) (O21 9)   4  Pruritus of pregnancy in third trimester (646 83,698 9) (O99 713,L29 9)    Current Meds   1  Calcium 500 + D TABS; Therapy: (Recorded:10Mar2017) to Recorded   2  Multi Prenatal TABS; Therapy: (Recorded:18Nqi0883) to Recorded   3  Omega-3 CAPS; Therapy: (Recorded:10Mar2017) to Recorded    Allergies    1  No Known Drug Allergies    Future Appointments    Date/Time Provider Specialty Site   06/13/2017 10:00 AM THONY Bonilla  Obstetrics/Gynecology [address] OB/GYN     Message     Date: 12 Jun 2017 12:52 PM EST, Recorded By: Sushma Ayala   Calling For: Frances Perez   Caller: Samanta Vale, Self   Phone: [phone] (Home)   Reason: Medical Complaint   Pt 's  called the office stating that she is 34 wks and is experiencing light bleeding  Brownish in color  When asked cramping worse yesterday than today  Baby movement is good  Bleeding appears to be mucous like  As per BEO to bring her in on the NST  Pt  aware       Signatures   Electronically signed by : THONY Jenkins ; Jun 12 2017  9:17PM EST                       (Co-participant)

## 2018-01-18 NOTE — RESULT NOTES
Verified Results  (Q) CBC (H/H, RBC, INDICES, WBC, PLT) 80HEW3619 12:17PM Ronnie CRAZE     Test Name Result Flag Reference   WHITE BLOOD CELL COUNT 12 2 Thousand/uL H 3 8-10 8   RED BLOOD CELL COUNT 4 15 Million/uL  3 80-5 10   HEMOGLOBIN 12 1 g/dL  11 7-15 5   HEMATOCRIT 37 0 %  35 0-45 0   MCV 89 0 fL  80 0-100 0   MCH 29 2 pg  27 0-33 0   MCHC 32 8 g/dL  32 0-36 0   RDW 13 2 %  11 0-15 0   PLATELET COUNT 481 Thousand/uL  140-400   MPV 7 5 fL  7 5-12 5     (Q) GLUCOSE, GESTATIONAL SCREEN (50G)-140 CUTOFF [id number] 12:17PM Ronnie CRAZE     Test Name Result Flag Reference   GLUCOSE, GESTATIONAL$SCREEN (50G)-140 CUTOFF 126 mg/dL  <140     (Q) RPR (DX) W/REFL TITER AND CONFIRMATORY TESTING [id number] 12:17PM Ronnie CRAZE     Test Name Result Flag Reference   RPR (DX) W/REFL TITER AND$CONFIRMATORY TESTING NON-REACTIVE  NON-REACTIVE

## 2018-01-22 VITALS
HEIGHT: 63 IN | BODY MASS INDEX: 24.98 KG/M2 | WEIGHT: 141 LBS | DIASTOLIC BLOOD PRESSURE: 64 MMHG | SYSTOLIC BLOOD PRESSURE: 114 MMHG

## 2018-01-22 VITALS
DIASTOLIC BLOOD PRESSURE: 80 MMHG | BODY MASS INDEX: 30.83 KG/M2 | HEIGHT: 63 IN | SYSTOLIC BLOOD PRESSURE: 118 MMHG | WEIGHT: 174 LBS

## 2018-01-22 VITALS
DIASTOLIC BLOOD PRESSURE: 64 MMHG | SYSTOLIC BLOOD PRESSURE: 104 MMHG | BODY MASS INDEX: 28.35 KG/M2 | WEIGHT: 160 LBS | HEIGHT: 63 IN

## 2018-01-22 VITALS
HEIGHT: 63 IN | WEIGHT: 168 LBS | SYSTOLIC BLOOD PRESSURE: 108 MMHG | BODY MASS INDEX: 29.77 KG/M2 | DIASTOLIC BLOOD PRESSURE: 64 MMHG

## 2018-01-22 VITALS
DIASTOLIC BLOOD PRESSURE: 60 MMHG | HEIGHT: 64 IN | WEIGHT: 145 LBS | SYSTOLIC BLOOD PRESSURE: 102 MMHG | BODY MASS INDEX: 24.75 KG/M2

## 2018-01-22 VITALS
WEIGHT: 151 LBS | SYSTOLIC BLOOD PRESSURE: 110 MMHG | DIASTOLIC BLOOD PRESSURE: 60 MMHG | HEIGHT: 63 IN | BODY MASS INDEX: 26.75 KG/M2

## 2018-01-22 VITALS
BODY MASS INDEX: 22.15 KG/M2 | HEIGHT: 63 IN | DIASTOLIC BLOOD PRESSURE: 58 MMHG | SYSTOLIC BLOOD PRESSURE: 100 MMHG | WEIGHT: 125 LBS

## 2018-01-22 VITALS
WEIGHT: 152 LBS | HEIGHT: 63 IN | SYSTOLIC BLOOD PRESSURE: 102 MMHG | DIASTOLIC BLOOD PRESSURE: 60 MMHG | BODY MASS INDEX: 26.93 KG/M2

## 2018-01-22 VITALS
HEIGHT: 63 IN | WEIGHT: 135 LBS | SYSTOLIC BLOOD PRESSURE: 130 MMHG | BODY MASS INDEX: 23.92 KG/M2 | DIASTOLIC BLOOD PRESSURE: 70 MMHG

## 2018-01-22 VITALS
HEIGHT: 63 IN | DIASTOLIC BLOOD PRESSURE: 60 MMHG | WEIGHT: 163 LBS | BODY MASS INDEX: 28.88 KG/M2 | SYSTOLIC BLOOD PRESSURE: 100 MMHG

## 2018-01-22 VITALS
SYSTOLIC BLOOD PRESSURE: 122 MMHG | BODY MASS INDEX: 27.64 KG/M2 | WEIGHT: 156 LBS | DIASTOLIC BLOOD PRESSURE: 70 MMHG | HEIGHT: 63 IN

## 2018-01-22 VITALS
BODY MASS INDEX: 27.64 KG/M2 | DIASTOLIC BLOOD PRESSURE: 70 MMHG | WEIGHT: 156 LBS | SYSTOLIC BLOOD PRESSURE: 110 MMHG | HEIGHT: 63 IN

## 2018-01-22 VITALS
WEIGHT: 175 LBS | SYSTOLIC BLOOD PRESSURE: 110 MMHG | DIASTOLIC BLOOD PRESSURE: 70 MMHG | BODY MASS INDEX: 31.01 KG/M2 | HEIGHT: 63 IN

## 2018-01-22 VITALS
WEIGHT: 166 LBS | BODY MASS INDEX: 29.41 KG/M2 | HEIGHT: 63 IN | SYSTOLIC BLOOD PRESSURE: 118 MMHG | DIASTOLIC BLOOD PRESSURE: 68 MMHG

## 2018-03-07 NOTE — PROGRESS NOTES
Education  [facility] 2nd Trimester:   Second Trimester Education provided:   benefits of breastfeeding, importance of exclusive breastfeeding, early initiation of breastfeeding, exclusive breastfeeding for the first 6 months, non-pharmacologic pain relief methods for labor, rooming-in on 24 hour basis and Pregnancy Essentials Reference Guide given  Mother has not registered for prenatal class  Thought has not been given to selecting a pediatrician  The mother has not discussed personal preferences with her provider  Contraindication to breastfeeding identified: Language barrier        Signatures   Electronically signed by : THONY Briggs ; Jan 16 2017  5:30PM EST                       (Administrative)

## 2018-03-14 ENCOUNTER — OFFICE VISIT (OUTPATIENT)
Dept: URGENT CARE | Age: 28
End: 2018-03-14

## 2018-03-14 VITALS
RESPIRATION RATE: 20 BRPM | DIASTOLIC BLOOD PRESSURE: 83 MMHG | TEMPERATURE: 99.1 F | WEIGHT: 138.89 LBS | BODY MASS INDEX: 23.71 KG/M2 | HEART RATE: 84 BPM | OXYGEN SATURATION: 100 % | HEIGHT: 64 IN | SYSTOLIC BLOOD PRESSURE: 120 MMHG

## 2018-03-14 DIAGNOSIS — R68.89 FLU-LIKE SYMPTOMS: Primary | ICD-10-CM

## 2018-03-14 PROCEDURE — 99214 OFFICE O/P EST MOD 30 MIN: CPT | Performed by: FAMILY MEDICINE

## 2018-03-14 PROCEDURE — 87798 DETECT AGENT NOS DNA AMP: CPT | Performed by: NURSE PRACTITIONER

## 2018-03-14 RX ORDER — ACETAMINOPHEN 325 MG/1
650 TABLET ORAL EVERY 6 HOURS PRN
COMMUNITY
End: 2020-06-21 | Stop reason: HOSPADM

## 2018-03-14 RX ORDER — MULTIVITAMIN
1 CAPSULE ORAL DAILY
COMMUNITY

## 2018-03-14 RX ORDER — OSELTAMIVIR PHOSPHATE 75 MG/1
75 CAPSULE ORAL EVERY 12 HOURS SCHEDULED
Qty: 10 CAPSULE | Refills: 0 | Status: SHIPPED | OUTPATIENT
Start: 2018-03-14 | End: 2018-03-19

## 2018-03-14 NOTE — PROGRESS NOTES
[facility]        NAME: You Harrell is a 32 y o  female  : 1990    MRN: [id number]  DATE: 2018  TIME: 1:57 PM    Assessment and Plan   Flu-like symptoms [R68 89]  1  Flu-like symptoms  Influenza A/B and RSV by PCR (Indicated for patients > 2 mo of age)    oseltamivir (TAMIFLU) 75 mg capsule    CANCELED: Influenza A/B and RSV by PCR         Patient Instructions     Patient Instructions   Rest and drink extra fluids  Start Tamiflu or wait for results tomorrow  Come in tomorrow for influenza resutls  Side effects discussed  Tylenol or Motrin as needed for pain or fever  Call and discuss with OB which OTC cough medication are safe to use while breastfeeding  Tea with honey can help soothe sore throat  Cool mist humidification can be helpful  Follow up with PCP if no improvement  Go to ER with worsening symptoms, persistent fevers, SOB or difficulty breathing  Chief Complaint     Chief Complaint   Patient presents with    Cold Like Symptoms     x 1 day         History of Present Illness   You Harrell presents to the clinic c/o    This is a 32year old female here today with cough, congestion, sore throat, body aches and chills  Symptoms started yesterday  She has not been taking anything OTC  She is currently breastfeeding  Infant son was recently ill  She is here today with   She speaks [place]   is here to help with translation  They declined language line  Also used brother on phone for translation  Review of Systems   Review of Systems   Constitutional: Positive for activity change, chills and fatigue  Negative for fever  HENT: Positive for congestion, sneezing and sore throat  Negative for sinus pain and sinus pressure  Respiratory: Positive for cough  Negative for shortness of breath  Gastrointestinal: Negative  Neurological: Negative  Psychiatric/Behavioral: Negative            Current Medications     Long-Term Prescriptions   Medication Sig Dispense Refill    Multiple Vitamin (MULTIVITAMIN) capsule Take 1 capsule by mouth daily         Current Allergies     Allergies as of 03/14/2018    (No Known Allergies)            The following portions of the patient's history were reviewed and updated as appropriate: allergies, current medications, past family history, past medical history, past social history, past surgical history and problem list     Objective   /83   Pulse 84   Temp 99 1 °F (37 3 °C) (Temporal)   Resp 20   Ht 5' 4 17" (1 63 m)   Wt 63 kg (138 lb 14 2 oz)   LMP 03/13/2018 (Exact Date)   SpO2 100%   Breastfeeding? Yes   BMI 23 71 kg/m²        Physical Exam     Physical Exam   Constitutional: She appears well-developed and well-nourished  Appears mildly ill   HENT:   Mouth/Throat: Oropharynx is clear and moist    Neck: Normal range of motion  Neck supple  Cardiovascular: Normal rate, regular rhythm and normal heart sounds  Pulmonary/Chest: Effort normal and breath sounds normal    Skin: Skin is warm and dry  Psychiatric: She has a normal mood and affect   Her behavior is normal

## 2018-03-14 NOTE — PROGRESS NOTES
Assessment/Plan     {Assess/PlanSmarinks:36735}          Subjective:     Patient ID: Nathan Jackson is a 32 y o  female  Chief Complaint:    HPI  {Additional complaints:13623::" "}  {Common ambulatory SmartLinks:*::" "}    Past Medical History:   Diagnosis Date    Maternal excessive weight gain     57 lbs    Pruritic urticarial papules and plaques of pregnancy (puppp) 2017    Pruritic urticarial papules and plaques of pregnancy, antepartum     Varicella w/o complication childhood       Past Surgical History:   Procedure Laterality Date    [place]  DELIVERY ONLY N/A 2017    Procedure:  SECTION (); Surgeon: Braxton Wayne MD;  Location: [facility];  Service: Obstetrics    WISDOM TOOTH EXTRACTION         Family History   Problem Relation Age of Onset    Hyperlipidemia Mother     Hypertension Mother    Ariane Adler Arthritis Mother     Diabetes type II Mother     Diabetes type II Sister     Lung cancer Maternal Grandfather     Lung cancer Paternal Grandfather        Review of Systems    Objective:    /83   Pulse 84   Temp 99 1 °F (37 3 °C) (Temporal)   Resp 20   Ht 5' 4 17" (1 63 m)   Wt 63 kg (138 lb 14 2 oz)   LMP 2018 (Exact Date)   SpO2 100%   Breastfeeding?  Yes   BMI 23 71 kg/m²     Physical Exam

## 2018-03-15 ENCOUNTER — TELEPHONE (OUTPATIENT)
Dept: URGENT CARE | Age: 28
End: 2018-03-15

## 2018-03-15 LAB
FLUAV AG SPEC QL: NORMAL
FLUBV AG SPEC QL: NORMAL
RSV B RNA SPEC QL NAA+PROBE: NORMAL

## 2018-03-15 NOTE — TELEPHONE ENCOUNTER
presented to office to inquire of Influenza test results for wife - Gretchen Purvis  He translates for wife as she does not speak [place] - native language is [place]  Per Dr Samantha Dyer, advised that Influenza test done at office visit yesterday was negative (no Influenza virus detected) and to stop Tamiflu   verbalizes understanding, reports that she is feeling slightly improved and to f/u with OB/GYN as to what OTC cough medication is advised while breast feeding   verbalizes understanding and in agreement with plan

## 2018-07-17 ENCOUNTER — OFFICE VISIT (OUTPATIENT)
Dept: URGENT CARE | Age: 28
End: 2018-07-17

## 2018-07-17 VITALS
BODY MASS INDEX: 24.27 KG/M2 | HEIGHT: 63 IN | DIASTOLIC BLOOD PRESSURE: 60 MMHG | SYSTOLIC BLOOD PRESSURE: 120 MMHG | TEMPERATURE: 98.2 F | RESPIRATION RATE: 20 BRPM | HEART RATE: 78 BPM | OXYGEN SATURATION: 99 % | WEIGHT: 137 LBS

## 2018-07-17 DIAGNOSIS — J01.00 ACUTE NON-RECURRENT MAXILLARY SINUSITIS: ICD-10-CM

## 2018-07-17 DIAGNOSIS — J06.9 ACUTE UPPER RESPIRATORY INFECTION: Primary | ICD-10-CM

## 2018-07-17 DIAGNOSIS — J02.9 SORE THROAT: ICD-10-CM

## 2018-07-17 LAB — S PYO AG THROAT QL: NEGATIVE

## 2018-07-17 PROCEDURE — G0382 LEV 3 HOSP TYPE B ED VISIT: HCPCS | Performed by: FAMILY MEDICINE

## 2018-07-17 PROCEDURE — 87070 CULTURE OTHR SPECIMN AEROBIC: CPT | Performed by: FAMILY MEDICINE

## 2018-07-17 PROCEDURE — 87147 CULTURE TYPE IMMUNOLOGIC: CPT | Performed by: FAMILY MEDICINE

## 2018-07-17 RX ORDER — AMOXICILLIN 500 MG/1
500 CAPSULE ORAL EVERY 8 HOURS SCHEDULED
Qty: 30 CAPSULE | Refills: 0 | Status: SHIPPED | OUTPATIENT
Start: 2018-07-17 | End: 2018-07-27

## 2018-07-17 NOTE — PROGRESS NOTES
[facility]        NAME: Patricia Hughes is a 32 y o  female  : 1990    MRN: [id number]  DATE: 2018  TIME: 5:10 PM    Assessment and Plan   Acute upper respiratory infection [J06 9]  1  Acute upper respiratory infection     2  Sore throat  POCT rapid strepA    Throat culture   3  Acute non-recurrent maxillary sinusitis  amoxicillin (AMOXIL) 500 mg capsule         Patient Instructions     Patient Instructions   Amoxicillin 3 times a day until finished (please take probiotics)  Tylenol, or ibuprofen (Advil/Motrin) as needed  Gargle and swish mouth with warm salt water or mouthwash  Recheck/follow-up with family physician  Please go to the hospital emergency department if needed  Follow up with PCP in 3-5 days  Proceed to  ER if symptoms worsen  Chief Complaint     Chief Complaint   Patient presents with    Cough     sinus pressure between eyes for 3 weeks   Sore Throat         History of Present Illness       Congestion, sinus pressure, sore throat for the past 3 weeks -getting worse; patient is nursing        Review of Systems   Review of Systems   Constitutional: Positive for fatigue  HENT: Positive for congestion, sinus pressure and sore throat  Respiratory: Positive for cough  All other systems reviewed and are negative          Current Medications       Current Outpatient Prescriptions:     acetaminophen (TYLENOL) 325 mg tablet, Take 650 mg by mouth every 6 (six) hours as needed for mild pain, Disp: , Rfl:     amoxicillin (AMOXIL) 500 mg capsule, Take 1 capsule (500 mg total) by mouth every 8 (eight) hours for 30 doses, Disp: 30 capsule, Rfl: 0    Calcium Carbonate-Vitamin D (CALCIUM 500/D PO), Take by mouth, Disp: , Rfl:     Multiple Vitamin (MULTIVITAMIN) capsule, Take 1 capsule by mouth daily, Disp: , Rfl:     Current Allergies     Allergies as of 2018    (No Known Allergies)            The following portions of the patient's history were reviewed and updated as appropriate: allergies, current medications, past family history, past medical history, past social history, past surgical history and problem list      Past Medical History:   Diagnosis Date    Maternal excessive weight gain     57 lbs    Pruritic urticarial papules and plaques of pregnancy (puppp) 2017    Pruritic urticarial papules and plaques of pregnancy, antepartum     Varicella w/o complication childhood       Past Surgical History:   Procedure Laterality Date    [place]  DELIVERY ONLY N/A 2017    Procedure:  SECTION (); Surgeon: Zo Cortez MD;  Location[facility];  Service: Obstetrics    WISDOM TOOTH EXTRACTION         Family History   Problem Relation Age of Onset    Hyperlipidemia Mother     Hypertension Mother    Baltazar Lolis Arthritis Mother     Diabetes type II Mother     Diabetes type II Sister     Lung cancer Maternal Grandfather     Lung cancer Paternal Grandfather          Medications have been verified  Objective   /60   Pulse 78   Temp 98 2 °F (36 8 °C) (Temporal)   Resp 20   Ht 5' 3" (1 6 m)   Wt 62 1 kg (137 lb)   SpO2 99%   BMI 24 27 kg/m²        Physical Exam     Physical Exam   Constitutional: She is oriented to person, place, and time  She appears well-developed and well-nourished  No distress  HENT:   Right Ear: External ear normal    Left Ear: External ear normal    Nasal congestion; tenderness over the maxillary sinuses; injection, slight erythema of the oropharynx   Neck: Normal range of motion  Neck supple  Cardiovascular: Normal rate, regular rhythm and normal heart sounds  Pulmonary/Chest: Effort normal and breath sounds normal    Neurological: She is alert and oriented to person, place, and time  No nuchal rigidity   Skin: Skin is warm  Good color and turgor   Psychiatric: She has a normal mood and affect  Her behavior is normal    Nursing note and vitals reviewed

## 2018-07-17 NOTE — PATIENT INSTRUCTIONS
Amoxicillin 3 times a day until finished (please take probiotics)  Tylenol, or ibuprofen (Advil/Motrin) as needed  Gargle and swish mouth with warm salt water or mouthwash  Recheck/follow-up with family physician  Please go to the hospital emergency department if needed

## 2018-07-19 LAB — BACTERIA THROAT CULT: ABNORMAL

## 2019-08-26 ENCOUNTER — OFFICE VISIT (OUTPATIENT)
Dept: OBGYN CLINIC | Facility: CLINIC | Age: 29
End: 2019-08-26

## 2019-08-26 VITALS
WEIGHT: 134 LBS | DIASTOLIC BLOOD PRESSURE: 66 MMHG | BODY MASS INDEX: 22.33 KG/M2 | SYSTOLIC BLOOD PRESSURE: 101 MMHG | HEIGHT: 65 IN | HEART RATE: 74 BPM

## 2019-08-26 DIAGNOSIS — N63.0 BREAST LUMP: Primary | ICD-10-CM

## 2019-08-26 PROCEDURE — 99204 OFFICE O/P NEW MOD 45 MIN: CPT | Performed by: OBSTETRICS & GYNECOLOGY

## 2019-08-26 NOTE — PROGRESS NOTES
Office Visit   CC: Breast Lump     Subjective  This pt speaks [place]  Phones were used for translation  Patricia Hughes is a 25 y o   female here for complaints of breast pain and possibly breast lump  She has had L lateral breast pain for 20 days  This has never happened before  She noticed a lump in that area after the pain began  She denies nipple discharge,bleeding, erythema, or other skin changes in either breast  She denies fever, CP, SOB, appetite or weight changes  She delivered her child in 2017 and has been breastfeeding; she initially fed with the L breast for 3 months and since then has only fed with the R breast  She stopped breastfeeding about 2 months ago  She denies a family history of breast or colon cancer  Gynecologic History  Patient's last menstrual period was 2019  Contraception: yes - condoms  Last Pap: pt is unsure   Last mammogram: none  Obstetric History  OB History    Para Term  AB Living   1 1 1     1   SAB TAB Ectopic Multiple Live Births         0 1      # Outcome Date GA Lbr Alex/2nd Weight Sex Delivery Anes PTL Lv   1 Term 17 40w2d / 01:58 3835 g (8 lb 7 3 oz) M CS-LTranv EPI N JAVON      Complications: Fetal Intolerance       Review of Systems  Pertinent items are noted in HPI  Objective  Gen: NAD, cooperative, pleasant   Breast exam: Bilateral normal-appearing breasts  No skin changes appreciated in either breast  No nipple discharge noted or expressed from either breast  Possible 1x1 cm palpable mass in L lateral breast palpated at 4 o'clock about 4 finger breadths away from the nipple  The mass is small, mobile, smooth  Pt noted to have tenderness upon palpation of this area and upon palpation of the L nipple  No enlarged axillary lymph nodes bilaterally  Assessment  25 y o   female presenting with possible breast mass  Pt stopped breastfeeding 2 months ago, does not present with concerning symptoms for malignancy   Physical exam reveals tenderness upon palpation of the L lateral breast and a possible small, smooth, mobile mass in this area  Possible differential includes a benign mass/cyst/galactocoele  Further workup is indicated  Plan  -Referral for limited diagnostic ultrasound of the L breast  Pt to call and make an appointment  D/w Dr Aicha White  This pt and her  requested that a male provider not be present during the encounter for Jehovah's witness reasons  I examined the patient alone  Dr Aicha White was aware of pt HPI and physical exam findings  Agreeable with plan for limited diagnostic U/S of L breast for this patient       Anjelica Taylor MD  PGY-1, OBGYN  08/26/19

## 2019-10-10 ENCOUNTER — TELEPHONE (OUTPATIENT)
Dept: OBGYN CLINIC | Facility: CLINIC | Age: 29
End: 2019-10-10

## 2019-10-10 NOTE — TELEPHONE ENCOUNTER
Left voicemail as a friendly reminder stating:  Please call [facility]'s central scheduling at [phone] to schedule your breast ultrasound  The order is already placed in EPIC, your medical record  Any questions feel free to use [employer] or call [phone], [facility]

## 2019-10-31 ENCOUNTER — HOSPITAL ENCOUNTER (OUTPATIENT)
Dept: ULTRASOUND IMAGING | Facility: CLINIC | Age: 29
Discharge: HOME | End: 2019-10-31
Payer: COMMERCIAL

## 2019-10-31 VITALS — BODY MASS INDEX: 22.33 KG/M2 | WEIGHT: 134 LBS | HEIGHT: 65 IN

## 2019-10-31 DIAGNOSIS — N63.0 BREAST LUMP: ICD-10-CM

## 2019-10-31 PROCEDURE — 76642 ULTRASOUND BREAST LIMITED: CPT

## 2019-11-19 ENCOUNTER — TELEPHONE (OUTPATIENT)
Dept: OBGYN CLINIC | Facility: CLINIC | Age: 29
End: 2019-11-19

## 2019-12-12 ENCOUNTER — ULTRASOUND (OUTPATIENT)
Dept: OBGYN CLINIC | Facility: CLINIC | Age: 29
End: 2019-12-12

## 2019-12-12 VITALS
HEART RATE: 85 BPM | SYSTOLIC BLOOD PRESSURE: 113 MMHG | HEIGHT: 65 IN | WEIGHT: 148 LBS | DIASTOLIC BLOOD PRESSURE: 72 MMHG | BODY MASS INDEX: 24.66 KG/M2

## 2019-12-12 DIAGNOSIS — N92.6 MISSED MENSES: Primary | ICD-10-CM

## 2019-12-12 DIAGNOSIS — Z3A.11 11 WEEKS GESTATION OF PREGNANCY: ICD-10-CM

## 2019-12-12 LAB
SL AMB  POCT GLUCOSE, UA: NORMAL
SL AMB LEUKOCYTE ESTERASE,UA: NORMAL
SL AMB POCT BLOOD,UA: NORMAL
SL AMB POCT KETONES,UA: NORMAL
SL AMB POCT NITRITE,UA: NORMAL
SL AMB POCT URINE HCG: POSITIVE
SL AMB POCT URINE PROTEIN: NORMAL

## 2019-12-12 PROCEDURE — 81025 URINE PREGNANCY TEST: CPT | Performed by: OBSTETRICS & GYNECOLOGY

## 2019-12-12 PROCEDURE — 81002 URINALYSIS NONAUTO W/O SCOPE: CPT | Performed by: OBSTETRICS & GYNECOLOGY

## 2019-12-12 PROCEDURE — 99213 OFFICE O/P EST LOW 20 MIN: CPT | Performed by: OBSTETRICS & GYNECOLOGY

## 2019-12-12 PROCEDURE — 76801 OB US < 14 WKS SINGLE FETUS: CPT | Performed by: OBSTETRICS & GYNECOLOGY

## 2019-12-12 NOTE — PROGRESS NOTES
Subjective:     Marnie Valero is a 30 y o   female who presents with amenorrhea with LMP of 19  She endorses no complaints today  This is a desired pregnancy  Objective:    Vitals: Blood pressure 113/72, pulse 85, height 5' 4 57" (1 64 m), weight 67 1 kg (148 lb), last menstrual period 2019, not currently breastfeeding  Body mass index is 24 96 kg/m²  FIRST TRIMESTER OBSTETRIC ULTRASOUND  2019  Micheline Low MD     INDICATION: Amenorrhea, viability    COMPARISON: None  TECHNIQUE:   Transvaginal imaging was performed to assess the gestation, myometrial/endometrial architecture and ovarian parenchymal detail  The study includes volumetric sweeps and traditional still imaging technique  FINDINGS:     A single intrauterine gestation is identified  Cardiac activity is detected at 175bpm       YOLK SAC:  Present and normal in size and appearance  MEAN CROWN RUMP LENGTH:  50 3 mm = 11 weeks 5 days   AMNIOTIC FLUID/SAC SHAPE:  Within expected normal range  UTERUS/ADNEXA:   No adnexal mass or pathologic cyst   No free fluid identified  IMPRESSION:     Single intrauterine pregnancy of 11 weeks 5 days gestational age  Fetal cardiac activity detected  No adnexal masses seen  Physical Exam   Constitutional: She appears well-developed and well-nourished  Pulmonary/Chest: Effort normal and breath sounds normal  No respiratory distress  She has no wheezes  Abdominal: Soft  She exhibits no distension  There is no tenderness  Assessment/Plan:    1  IUP   -IUP detected at 11 weeks 5 days gestation  Cardiac activity detected   -MFM appt made for first first trimester screening on 19 @1pm in [place]    2   RTO for nursing intake and prenatal H&P    Willy Mallory MD  2019  2:16 PM

## 2019-12-16 ENCOUNTER — ROUTINE PRENATAL (OUTPATIENT)
Dept: PERINATAL CARE | Facility: OTHER | Age: 29
End: 2019-12-16

## 2019-12-16 VITALS
HEART RATE: 92 BPM | WEIGHT: 149.4 LBS | HEIGHT: 64 IN | BODY MASS INDEX: 25.51 KG/M2 | DIASTOLIC BLOOD PRESSURE: 73 MMHG | SYSTOLIC BLOOD PRESSURE: 113 MMHG

## 2019-12-16 DIAGNOSIS — Z36.82 NUCHAL TRANSLUCENCY OF FETUS ON PRENATAL ULTRASOUND: ICD-10-CM

## 2019-12-16 DIAGNOSIS — Z3A.12 12 WEEKS GESTATION OF PREGNANCY: ICD-10-CM

## 2019-12-16 DIAGNOSIS — O34.211 MATERNAL CARE DUE TO LOW TRANSVERSE UTERINE SCAR FROM PREVIOUS CESAREAN DELIVERY: Primary | ICD-10-CM

## 2019-12-16 PROBLEM — O26.86: Status: RESOLVED | Noted: 2017-07-23 | Resolved: 2019-12-16

## 2019-12-16 PROBLEM — O26.00 EXCESSIVE WEIGHT GAIN DURING PREGNANCY: Status: RESOLVED | Noted: 2017-07-23 | Resolved: 2019-12-16

## 2019-12-16 PROCEDURE — 76813 OB US NUCHAL MEAS 1 GEST: CPT | Performed by: OBSTETRICS & GYNECOLOGY

## 2019-12-16 PROCEDURE — 76801 OB US < 14 WKS SINGLE FETUS: CPT | Performed by: OBSTETRICS & GYNECOLOGY

## 2019-12-16 PROCEDURE — 99241 PR OFFICE CONSULTATION NEW/ESTAB PATIENT 15 MIN: CPT | Performed by: OBSTETRICS & GYNECOLOGY

## 2019-12-16 NOTE — LETTER
December 16, 2019     Janett Falk MD  [facility] [address] [facility] [id number]    Patient: Riri Ruiz   YOB: 1990   Date of Visit: 12/16/2019       Dear Dr Angie Will: Thank you for referring Riri Ruiz to me for evaluation  Below are my notes for this consultation  If you have questions, please do not hesitate to call me  I look forward to following your patient along with you  Sincerely,        Marcela Cee MD        CC: No Recipients  Marcela Cee MD  12/16/2019  2:55 PM  Sign at close encounter    Please refer to the [facility] ultrasound report in Ob Procedures for additional information regarding the visit to the [facility]  today    Marcela Cee MD

## 2019-12-16 NOTE — PROGRESS NOTES
Please refer to the [facility] ultrasound report in Ob Procedures for additional information regarding the visit to the [facility]  today    Grey Lockhart MD

## 2019-12-30 ENCOUNTER — INITIAL PRENATAL (OUTPATIENT)
Dept: OBGYN CLINIC | Facility: CLINIC | Age: 29
End: 2019-12-30

## 2019-12-30 VITALS
BODY MASS INDEX: 25.44 KG/M2 | SYSTOLIC BLOOD PRESSURE: 113 MMHG | HEART RATE: 90 BPM | WEIGHT: 149 LBS | HEIGHT: 64 IN | DIASTOLIC BLOOD PRESSURE: 76 MMHG

## 2019-12-30 DIAGNOSIS — Z34.92 PREGNANT AND NOT YET DELIVERED IN SECOND TRIMESTER: ICD-10-CM

## 2019-12-30 DIAGNOSIS — Z78.9 LANGUAGE BARRIER: ICD-10-CM

## 2019-12-30 DIAGNOSIS — Z3A.14 14 WEEKS GESTATION OF PREGNANCY: Primary | ICD-10-CM

## 2019-12-30 PROCEDURE — 99211 OFF/OP EST MAY X REQ PHY/QHP: CPT

## 2019-12-30 NOTE — LETTER
Proof of Pregnancy Letter    Cecily Key  1990  [address]   [facility] [place] [address]        12/30/19      Cecily Key is a patient at our facility  Cecily Conwaydge Estimated Date of Delivery: 6/28/20       Any questions or concerns, please feel free to contact our office      Sincerely,     [facility]   [address]   [facility] [place] [address] [place] [address]   [phone]

## 2019-12-30 NOTE — PATIENT INSTRUCTIONS
[facility] would like to say Congratulations on your pregnancy! Checklist   Maternal Fetal Medicine: Call [phone] to schedule your appointment  o Sequential Screening   *(optional)  - Risks for Trisomy 18 and 21  - Spina Bifida (second part), after 16 weeks, will be explained by [facility] office  o Anatomy Scan   - 20 week ultrasound  - Gender may be revealed, your preference  - 1 hour appointment, only 1 support person allowed, NO children  -    Blood work : Please complete prior to your H&P appointment  o *NON-FASTING*  o CBC, Blood type and antibody screen, urinalysis, Random glucose level, HIV, Syphilis, Hepatitis B   IF applicable: 1 hour Glucola or Hemoglobin A1C   24 hour urine   CMP, Protein creatinine ratio   History &Physical: H&P appointment   o Physical exam  o Pelvic exam: GC/CT testing  o If needed: Pap smear    Plan: Routine prenatal visits every 4 weeks until 28 weeks   28 weeks:  Prenatal visits will be every 2 weeks  o TDaP vaccine  o Complete blood count  o RPR  o Blood type and antibodies   - Rhogham may be given at this time, if needed  o Gestational Diabetes, 1 hr Glucola test (non-fasting)  - If you fail the 1 hr Glucola, a 3 hr Glucola will be ordered by your provider  The 3 hr Glucola test is fasting     - If you fail the 3 hr Glucola test, you will be referred to the Maternal Fetal Medicine diabetic education team  They can further assist you by calling [phone]     o Start performing daily fetal kick counts  o Prenatal classes  - Call Bharati  67  [phone] to sign up    32 weeks: Please call [phone] (infoLink) to schedule your pre-birth visit   o Your visit will be one-to-one with a labor and delivery nurse to review your admission paperwork and complete your preadmission questionnaire  This will be a great opportunity to ask the nurse any questions you may have   o Your pre-birth visit does not include a tour of our maternity unit  Call Info Link or visit [email or web address]   36 weeks: Prenatal visits start to become weekly  o Group Beta Strep (GBS) will be collected  - This is done by a vaginal swab in the office  o Chlamydia/Gonorrhea testing will be obtained as well, if applicable  o Prepare for delivery  - Back your bag and belongings for the hospital  - Familiarize yourself with visiting guidelines  o RELAX: enjoy the last few weeks of your pregnancy  Warning signs during pregnancy  [phone]  Answering service during non-business hours     1  Vaginal bleeding  2  Sharp abdominal pain that does not go away  3  Fever (more than 100 4 and is not relieved by Tylenol)  4  Persistent vomiting lasting greater than 24 hours  5  Chest pain   6  Pain or burning when you urinate  7  Severe headache that doesn't resolve with Tylenol  8  Blurred vision or seeing spots in your vision  9  Sudden swelling of your face or hands  10  Redness, swelling or pain in a leg  11  A sudden weight gain in just a few days  12  Decrease in your baby's movement (after 28 weeks or the 6th month of pregnancy)  13  A loss of watery fluid from your vagina - can be a gush, a trickle or continuous wetness  14  After 20 weeks of pregnancy, rhythmic cramping (greater than 4 per hour) or menstrual like low/pelvic pain  15  You have cravings for substances such as diana, dirt, laundry starch, or ice  Medications and Pregnancy: The following list of over-the-counter medications is usually considered safe to take during pregnancy  Take care to not double up on products containing acetaminophen (Tylenol)       Colds/Sore Throat  Robitussin DM - Plain (guaifenesin)  Saline nasal spray  Warm salt water gargle  Cepacol throat lozenges or mouthwash (cetylpyridinium)  Sucrets (hexylresoricinol)    Allergy  AVOID the D - or DECONGESTANT  Claritin (loratadine)  Zrytec (cetirizine)  Allerga (fexofenadine)  Headache/ Aches and Pains    Headaches / Aches and Pains  Tylenol (acetaminophen)  Do NOT exceed more than 3000 mg of Tylenol in a 24-hour period  Heartburn  Mylanta (aluminum hydroxide/simethicone, magnesium hydroxide)  Maalaox (aluminum magnesium hydroxide, magnesium hydroxide)  Tums (calcium carbonate)  Riopan (magaldrate)    Constipation  Colace (docusate sodium)  Surfak (docusate sodium)  MiraLAX  Glycerin suppositories  Fleets enema (sodium phosphate & sodium biphosphate)    Nausea/Vomiting  Vitamin B6 (pyridoxine) - May take 50 mg at bedtime, 25 mg in the morning, 25 mg in the afternoon  Unisom (doxylamine) - May use for nausea/vomiting - (cut a 25 mg tablet in half)  May cause drowsiness  Sleep  Benadryl (diphenhydramine) - Take 1-2 tablets as needed at bedtime  Unisom (doxylamine) 25 mg tablet - As needed at bedtime  Melatonin 5 mg tablet - As needed at bedtime    Generally the generic form of medicine is usually lower priced than the brand name form of the medicine  Talk to your healthcare provider before you take any medicines  Many medicines may harm your baby if you take them when you are pregnant  Do not take any medicines, vitamins, herbs, or supplements without first talking to your healthcare provider  Pregnancy   What you need to know about pregnancy:     A normal pregnancy lasts about 40 weeks   The first trimester lasts from your last period through the 12th week of pregnancy   The second trimester lasts from the 13th week of your pregnancy through the 23rd week   The third trimester lasts from your 24th week of pregnancy until your baby is born  Body changes that may occur during your pregnancy:   1  Breast changes you will experience include tenderness and tingling during the early part of your pregnancy  Your breasts will become larger  You may need to use a support bra  You may see a thin, yellow fluid, called colostrum, leak from your nipples during the second trimester   Colostrum is a liquid that changes to milk about 3 days after you give birth  2  Skin changes and stretch marks  may occur during your pregnancy  You may have red marks, called stretch marks, on your skin  Stretch marks will usually fade after pregnancy  Use lotion if your skin is dry and itchy  The skin on your face, around your nipples, and below your belly button may darken  Most of the time, your skin will return to its normal color after your baby is born  3  Morning sickness  is nausea and vomiting that can happen at any time of day  Avoid fatty and spicy foods  Eat small meals throughout the day instead of large meals  Nicky may help to decrease nausea  Ask your healthcare provider about other ways of decreasing nausea and vomiting  4  Heartburn  may be caused by changes in your hormones during pregnancy  Your growing uterus may also push your stomach upward and force stomach acid to back up into your esophagus  Eat 4 or 5 small meals each day instead of large meals  Avoid spicy foods  Avoid eating right before bedtime  5  Constipation  may develop during your pregnancy  To treat constipation, eat foods high in fiber such as fiber cereals, beans, fruits, vegetables, whole-grain breads, and prune juice  Get regular exercise and drink plenty of water  Your healthcare provider may also suggest a fiber supplement to soften your bowel movements  Talk to your healthcare provider before you use any medicines to decrease constipation  6  Hemorrhoids  are enlarged veins in the rectal area  They may cause pain, itching, and bright red bleeding from your rectum  To decrease your risk of hemorrhoids, prevent constipation and do not strain to have a bowel movement  If you have hemorrhoids, soak in a tub of warm water to ease discomfort  Ask your healthcare provider how you can treat hemorrhoids  7  Leg cramps and swelling  may be caused by low calcium levels or the added weight of pregnancy   Raise your legs above the level of your heart to decrease swelling  During a leg cramp, stretch or massage the muscle that has the cramp  Heat may help decrease pain and muscle spasms  Apply heat on your muscle for 20 to 30 minutes every 2 hours for as many days as directed  8  Back pain  may occur as your baby grows  Do not stand for long periods of time or lift heavy items  Use good posture while you stand, squat, or bend  Wear low-heeled shoes with good support  Rest may also help to relieve back pain  Ask your healthcare provider about exercises you can do to strengthen your back muscles  Stay healthy during your pregnancy:    Eat a variety of healthy foods  Healthy foods include fruits, vegetables, whole-grain breads, low-fat dairy foods, beans, lean meats, and fish  Drink liquids as directed  Ask how much liquid to drink each day and which liquids are best for you  Limit caffeine to less than 200 milligrams each day  Limit your intake of fish to 2 servings each week  o Caffeine: It is not clear how caffeine affects pregnancy  Limit your intake of caffeine to avoid possible health problems  Caffeine may be found in coffee, tea, cola, sports drinks, and chocolate   Choose fish low in mercury such as canned light tuna, shrimp, crab, salmon, cod, or tilapia  Do not  eat fish high in mercury such as swordfish, tilefish, raya mackerel, and shark    o Foods that contain mercury:  Mercury is naturally found in almost all types of fish and shellfish  Some types of fish absorb higher levels of mercury that can be harmful to an unborn baby  Eat only fish and shellfish that are low in mercury  Each week, you may eat up to 12 ounces of fish or shellfish that have low levels of mercury  These include shrimp, canned light tuna, salmon, pollock, and catfish  Eat only 6 ounces of albacore (white) tuna per week  Albacore tuna has more mercury than canned tuna  Do not eat shark, swordfish, raya mackerel, or tilefish   Take prenatal vitamins as directed    Your need for certain vitamins and minerals, such as folic acid, increases during pregnancy  Prenatal vitamins provide some of the extra vitamins and minerals you need  Prenatal vitamins may also help to decrease the risk of certain birth defects   Ask how much weight you should gain during your pregnancy  Too much or too little weight gain can be unhealthy for you and your baby   Talk to your healthcare provider about exercise  Moderate exercise can help you stay fit  Your healthcare provider will help you plan an exercise program that is safe for you during pregnancy  Do not smoke  If you smoke, it is never too late to quit  Smoking increases your risk of a miscarriage and other health problems during your pregnancy  Smoking can cause your baby to be born too early or weigh less at birth  Ask your healthcare provider for information if you need help quitting  Do not drink alcohol  Alcohol passes from your body to your baby through the placenta  It can affect your baby's brain development and cause fetal alcohol syndrome (FAS)  FAS is a group of conditions that causes mental, behavior, and growth problems  Alcohol:  Do not drink alcohol during pregnancy  Alcohol can increase your risk of a miscarriage (losing your baby)  Never use illegal or street drugs (such as marijuana or cocaine) while you are pregnant  Safety tips:    Avoid hot tubs and saunas  Do not use a hot tub or sauna while you are pregnant, especially during your first trimester  Hot tubs and saunas may raise your baby's temperature and increase the risk of birth defects   Avoid toxoplasmosis  This is an infection caused by eating raw meat or being around infected cat feces  It can cause birth defects, miscarriages, and other problems  Wash your hands after you touch raw meat  Make sure any meat is well-cooked before you eat it  Avoid raw eggs and unpasteurized milk   Use gloves or ask someone else to clean your cat's litter box while you are pregnant   Ask your healthcare provider about travel  The most comfortable time to travel is during the second trimester  Ask your healthcare provider if you can travel after 36 weeks  You may not be able to travel in an airplane after 36 weeks  He may also recommend that you avoid long road trips  Pregnancy Diet   WHAT YOU NEED TO KNOW:   What is a healthy diet during pregnancy? A healthy diet during pregnancy is a meal plan that provides the amount of calories and nutrients you need during pregnancy  Your body needs extra calories and nutrients to support your growing baby  You need to gain the right amount of weight for a healthy baby and pregnancy  Babies born at a healthy weight have a lower risk of certain health problems at birth and later in life  A healthy diet may help you avoid gaining too much weight  Too much weight gain may cause problems for you during your pregnancy and delivery  What should I AVOID while I am pregnant?  Raw and undercooked foods: You should not eat undercooked or raw meat, poultry, eggs, fish, or shellfish (shrimp, crab, lobster)  Cook leftover foods and ready-to-eat foods such as hot dogs until they are steaming hot   Unpasteurized food:  Unpasteurized foods are foods that have not gone through the heating process (pasteurization) that destroys bacteria  You should not drink milk, juice, or cheese that has not been pasteurized  This includes Brie, feta, Camembert, blue, and [place] cheeses   Which foods can I eat while I am pregnant? Eat a variety of foods from each of the food groups listed below  Your dietitian will tell you how many servings you should have from each food group each day to get enough calories  The amount of calories you need depends on your daily activity, your weight before pregnancy, and current weight gain  Healthcare providers divide pregnancy into 3 periods of time called trimesters   In the first trimester, you usually do not need extra calories  In the second and third trimesters, most women should eat about 300 extra calories each day  What vitamin and mineral supplements may I need? Your healthcare provider will tell you if you need a supplement and the type you should take  Talk to your healthcare provider before you take any other kind of supplement, including herbal (natural) supplements  1  Prenatal vitamins:  Eat a variety of healthy foods, even if you take a prenatal vitamin  If you forget to take your vitamin, do not take double the amount the next day  2  Folic acid:  You need at least 352 mcg of folic acid each day before you get pregnant  Folic acid helps to form your baby's brain and spinal cord in early pregnancy  During pregnancy, your daily need for folic acid increases to about 600 mcg  Get folic acid each day by eating citrus fruits and juices, green leafy vegetables, liver, or dried beans  Folic acid is also added to foods such as breakfast cereals, bread products, flour, and pasta  3  Iron:  Iron is a mineral the body needs to make hemoglobin, which is a part of red blood cells  Hemoglobin helps your blood carry oxygen from the lungs to the rest of your body  Foods that are good sources of iron are meat, poultry, fish, beans, spinach, and fortified cereals and breads  Your body will absorb iron better from non-meat sources if you have a source of vitamin C at the same time  Drink tea and coffee separately from iron-fortified foods and iron supplements  You need about 30 mg of iron each day during pregnancy  4  Calcium and vitamin D:  Women who do not eat dairy products may need a calcium and vitamin D supplement  Talk to your healthcare provider about calcium supplements if you do not regularly eat good sources of calcium  The amount of calcium you need is about 1,300 mg if you are between 15and 25years old and 1,000 mg if you are 23to 48years old  What other healthy guidelines should I follow? Vegetarians and vegans: If you are a vegetarian or vegan, get enough protein, vitamin B12, and iron during your pregnancy  Some non-meat sources of these nutrients are fortified cereals, nut butters, soy products (tofu and soymilk), nuts, grains, and legumes  These nutrients are also found in eggs and milk products  Cravings: You may have cravings for certain foods during your pregnancy  Foods that are high in calories, fat, and sugar should not replace healthy food choices  Some women have cravings for unusual substances such as diana, dirt, laundry starch, ice, and chalk  This condition is called pica  These may lead to health problems such as anemia and cause other health problems  Nausea and Vomiting in Pregnancy   Nausea and vomiting in pregnancy  can happen any time of day  These symptoms usually start before the 9th week of pregnancy, and end by the 14th week (second trimester)  Some women can have nausea and vomiting for a longer time  These symptoms can affect some women throughout the entire pregnancy  Nausea and vomiting do not harm your baby  These symptoms can make it hard for you to do your daily activities  Seek care immediately if:    You have signs of dehydration  Examples are dark yellow urine, dry mouth and lips, dry skin, fast heartbeat, and urinating less than usual    You have severe abdominal pain   You feel too weak or dizzy to stand up   You see blood in your vomit or bowel movements   Contact your healthcare provider if:    You vomit more than 4 times in 1 day   You have not been able to keep liquids down for more than 1 day   You lose more than 2 pounds   You have a fever   Your nausea and vomiting continue longer than 14 weeks   You have questions or concerns about your condition or care     Treatment  for nausea and vomiting in pregnancy is usually not needed   Talk to your healthcare provider if your symptoms do not decrease with the changes suggested below  You may need vitamin B6 and medicine if these changes do not help, or your symptoms become severe  Nutrition changes you can make to manage nausea and vomiting:    Eat small meals throughout the day instead of 3 large meals  You may be more likely to have nausea and vomiting when your stomach is empty  Eat foods that are low in fat and high in protein  Examples are lean meat, beans, turkey, and chicken without the skin  Eat a small snack, such as crackers, dry cereal, or a small sandwich before you go to bed   Eat some crackers or dry toast before you get out of bed in the morning  Get out of bed slowly  Sudden movements could cause you to get dizzy and nauseated   Eat bland foods when you feel nauseated  Examples of bland foods include dry toast, dry cereal, plain pasta, white rice, and bread  Other bland foods include saltine crackers, bananas, gelatin, and pretzels  Avoid spicy, greasy, and fried foods  Avoid any other foods that make you feel nauseated   Drink liquids that contain ginger  Drink ginger ale made with real ginger or ginger tea made with fresh grated ginger  Ginger capsules or ginger candies may also help to decrease nausea and vomiting   Drink liquids between meals instead of with meals  Wait at least 30 minutes after you eat to drink liquids  Drink small amounts of liquids often throughout the day to prevent dehydration  Ask how much liquid you should drink each day   Other changes you can make to manage nausea and vomiting:    Avoid smells that bother you  Strong odors may cause nausea and vomiting to start, or make it worse  Take a short walk, turn on a fan, or try to sleep with the window open to get fresh air  When you are cooking, open windows to get rid of smells that may cause nausea   Do not brush your teeth right after you eat  if it makes you nauseated   Rest when you need to    Start activity slowly and work up to your usual routine as you start to feel better   Talk to your healthcare provider about your prenatal vitamins  Prenatal vitamins can cause nausea for some women  Try taking your prenatal vitamin at night or with a snack  If this change does not help, your healthcare provider may recommend a different type of vitamin   Do not use any medicines, vitamins, or supplements to manage your symptoms without asking your healthcare provider  Many medicines can harm an unborn baby   Light to moderate exercise  may help to decrease your symptoms  It may also help you to sleep better at night  Ask your healthcare provider about the best exercise plan for you  [facility]?n Sa?l??? Hamileli?iniz için tebrikler! Ronel Soulier  Maternal Fetal T?p: Dennis planlamak [facility] [id number] aray?n  S?ral? Tarama * (iste?e ba?l?)  Trizomi 18 ve 21 için riskler  Spina Bifida (ikinci bölüm), 16 hafta sonra MFM ofisi taraf?ndan aç?klanacak  Anatomi Taramas? 20 haftal?k ultrason  Cinsiyet aç??a ç?kabilir, sizin tercihiniz  1 saatlik demetriusu, sadece 1 destek görevlisi kabul edilir, HAYIR çocuk    Sulma çal??mas?: Lütfen H&P sheila önce tamamlay?n  * OLMAYAN FASTING *  CBC, Sulma grubu ve antikor taramas?, idrar tahlili, Rastgele glikoz seviyesi, HIV, Sifiliz, Hepatit B  Varsa: 1 saat Glukola veya Hemoglobin A1C  24 saatlik idrar  CMP, Protein kreatinin kirsten? Tarihçe ve Fiziksel: H&P randmarilu  Fizik s?damián? Pel[place] madinaene: GC / CT testi  Myla: Pap smear    Plan: Her 4 haftada bir 28 haftaya kadar rutin prenatal ziyaretler  28 hafta: Do?um öncesi ziyaretler 2 haftada bir yap?lacakt?r  TDaP a??s? [place] sulma say?m?  RPR  Sulma grubu ve antikorlar  Gerekirse Rhogham ?u echo verilebilir  Gestasyonel Diyabet, 1 saat Glukola testi (oruç tuteve)  1 saatlik Glucola'da ba?ar?s?z olursan?z, sa?lay?c?n?z taraf?ndan 3 saatlik Glucola sipari? edilecektir  3 saatlik Glukola testi oruç tutestevan    3 saatlik Glucola testinde ba?ar?s?z olursan?z, Maternal Fetal Medicine diyabetik e?itim ekibine yölindairimaggiersnaylaz  [phone]'F arayarak size daha fazla yard?mc? olabilirler  Günlük fetal vuru? say?lar? yapmaya ba?lay?n  Do?um öncesi s? n?flar  Kaydolmak için INFOLINK CLASSES [phone] numaral? telefonu aray? n  32 hafta: Do?um öncesi ziyaretinizi planlamak için lütfen [phone] (infoLink) numaral? telefonu aray?n   Ziyaretiniz, kabul evraklar?n?z? incelemek ve ön ödeme anketinizi tamamlamak için bir i?gücü ve do?um hem?iresi ile birebir olacakt?r  Bu, hem?irenizle ilgili sorular?n?z? sormak için amie bir f?rsat olacakt?r  Do?um öncesi ziyaretiniz annelik birimimizde bir turu içermez  Ça?r? Bilgi Ba? lant?s? veya www [email or web address] adresini ziyaret severo  36 hafta: Do?um öncesi ziyaretler haftal?k olmaya ba?lar  Grup Beta Strep (GBS) toplanacak  Bu, ofiste vajinal bir çubukla yap?l?r  Varsa, Chlamydia / Chrystal Sayner testi de yap?lacakt?r  Teslimata haz?rlan?n  Çantan? ve e?yalar?n? hastaneye ron kwame  Ziyaret yönergeleri hakk?nda bilgi edinin  RELAX: Hamileli?inizin son Bernice Blood haftas?n?n tad?n? ç?toya?n  Hamilelik s?soha?nda uyar? i?aretleri  [phone]  Mesai saatleri d? ??nda cevaplama hizmeti    1  Marchia Carota  2  Wandra Daniel a?r?s? 3  Ate? (100  4'den fazla ve Tylenol taraf?ndan rahatlat?lmaz)  4  24 saatten fazla süren aristeo?c? kusma  5  Gö?üs a?r?s?  6  ?cameron ashraf a?r? veya yanma  7  Tylenol ile çözülmeyen ?iddetli ba? a?r?s? 8  Bulan?k görme veya görme noktas?nda görme  9  Yüzün veya ellerin ani ? i?jovani  10  Bir bacakta k?earl?kl?k, ?i?me veya a?r?  11  Sadece birkaç gün içinde ani kilo art???  12  Jacqueline?inizin hareketinde azalma (28 hafta veya hamileli?in 6  ay?nda)  13  Vajinan?zdan sulu s?v? kayb? - ta?k?nl?k, damlama veya sürekli ?slakl?k olabilir  14  20 haftal?k hamilelikten jay, eliud long (saatte 4'ten fazla) veya dü?ük / pelvik a?r? arcelia power  15  Morgan Lie, çama??r ni?astas?  Priya Olsen olur     ?laçlar ve [place]ebelik:  A?a??daki reçetesiz sat?shayy ilaçlar listesinin hamilelik s?soha?nda al?nmas? güvenli kabul edilir  Asetaminofen (Tylenol) içeren ürünleri ikiye katlamamaya dikkat severo  So?uk Alg?nl? ?? / Irene?az A?r?s? Robitussin DM - Düz (guaifenesin)  Tuzlu burun spreyi  Il?k tuzlu peterson gargaras? Cepacol irene?az pastilleri veya gargara (setilpiridinyum)  Sakarlar (heksilresorisinol)    Alerji  D - [place] Lord? NS?ZDEN KAÇININ  Klaritin (loratadin)  Zrytec (setirizin)  Alerji (feksofenadin)  Ba? A?r?s? / A?r?lar ve A?r?lar    Ba? A?r?lar? / A?r?lar ve A?r?lar  Tylenol (asetaminofen)  24 saatlik bir süre içinde 3000 mg'dan fazla Tylenol'ü A? Nicole Caldron  Mide ek?imesi  Mylanta (alüminyum hidroksit / simetikon, magnezyum hidroksit)  Maalaox (alüminyum magnezyum hidroksit, magnezyum hidroksit)  Tums (kalsiyum karbonat)  Riopan (magaldrat)    Kab?zl?k  Colace (doktora sodyum)  Surfak (doktora sodyum)  MiraLax  Gliserin fitilleri  Princess lavman (sodyum fosfat ve sodyum bifosfat)    Bulant? kusma  B6 Vitamini (piridoksin) - Yatmadan önce 50 mg, sabah 25 mg, ö?leden sonra 25 mg alabilir  Unisom (doksilamin) - Bulant? / kusma için kullanabilir - (25 mg'l?k bir tableti ikiye bölün)  Uyu?uklu?a neden olabilir  Uyku  Benadryl (difenhidramin) - Yatmadan önce 1-2 tablet al?n  Unisom (doksilamin) 25 mg tablet - Yatmadan önce gerekti?i gibi  Melatonin 5 mg tablet - Yatmadan önce gerekti?i gibi    Genel olarak ilac?n jenerik formu genellikle ilac?n wilbur ad? shamika dajc dü? fiyatl?d?hong Wang bir ilaç almadan önce sa?l?k uzman?n?zla konu?un  Hamileyken al?rsan?z miryamçok ilaç tatiana?inize zarar verebilir   ?lk önce sa?l?k uzman?n?zla konu?austin wang bir ilaç, vitamin, bitki veya takviye almay?n

## 2019-12-30 NOTE — LETTER
Work Letter    [employer]  1990  [address] [place] [address]    Dear [employer],      12/30/19        Your employee is a patient at [facility]  We recommend that all pregnant women:    1  Have a well-ventilated workspace  2  Wear low-heeled shoes  3  Work no more than 40 hours per week  4  Have a 15 minute break every 2 hours and at least 30 minutes for a meal break  5  Use good body mechanics by bending at your knees to avoid back strain and lift no more than 20 pounds without assistance  Will need assistance with lifting over 20 lbs  6  Have ready access to bathrooms and water  She may continue to work until her due date unless medical complications arise  We anticipate she may return to work in 6-8 weeks after delivery       Sincerely,    [facility]

## 2019-12-30 NOTE — PROGRESS NOTES
OB INTAKE INTERVIEW  Pt presents for OB intake    OB History    Para Term  AB Living   2 1 1     1   SAB TAB Ectopic Multiple Live Births         0 1      # Outcome Date GA Lbr Alex/2nd Weight Sex Delivery Anes PTL Lv   2 Current            1 Term 17 40w2d / 01:58 3835 g (8 lb 7 3 oz) M CS-LTranv EPI N JAVON      Complications: Fetal Intolerance     Hx of  delivery prior to 36 weeks 6 days:  No    Last Menstrual Period:    Patient's last menstrual period was 2019  Ultrasound date: 2019  11 weeks 5 days     Estimated Date of Delivery: 20   Confirmed by LMP    H&P visit scheduled  2020  @ 11:30 am  with Dr James Burris      Last pap smear: no records   Per pt "normal 2017"    Current Issues:  Constipation :   No  Headaches :   No  Cramping:  No  Spotting :   No  PICA cravings :  No    FOB Involved:  Yes  Planned pregnancy:  Yes    I have these concerns about this prenatal patient:   1  14 weeks gestation of pregnancy  - Prenatal Panel; Future  Discussed QUAD screen  2  Pregnant and not yet delivered in second trimester  - Ambulatory referral to social work care management program; Future  - Ambulatory Referral to Maternal Fetal Medicine; Future   20 week ultrasound scheduled for Feb  10, 2020     3  Language barrier: Interpretor ID # [id number]  Ivorian  Pt requests a      Pt requests ONLY FEMALE providers  Interview education   [facility] Pregnancy Essentials Book reviewed and discussed    Baby and Me Handout   Baby and Me [facility] Handout   [facility]   Discussed genetic testing   Prenatal lab work: Scripts printed and given to pt   Influenza vaccine given today: No   Discussed Tdap vaccine     Immunizations:   Immunization History   Administered Date(s) Administered    DTaP 2017    Tdap 2017     Depression Screening Follow-up Plan: Patient's depression screening was negative with an Tony score of  3  Clinically patient does not have depression  No treatment is required       Nurse/Family Partnership- referral placed:  N/A   If yes, place referral for case management, Kevin Oates  BMI Counseling: Body mass index is 25 58 kg/m²  Tobacco Cessation Counseling: Never smoked  Infection Screening: Does the pt have a hx of MRSA? No    The patient was oriented to our practice and all questions were answered    Interviewed by: Sae Santos RN 12/30/19

## 2019-12-30 NOTE — LETTER
[facility] Letter    Shahzad Allen  1990  [address] [place] [address]       12/30/19          Shahzad Allen is a patient and under our care in our office  Radha Abbasi's Estimated Date of Delivery: 6/28/20  Any questions or concerns feel free to contact our office       Thank you,    [address] [facility]  [id number] [place] [address]/Rajinder Ma 15  [facility] [place]/Maribel Snyder [address]  [phone]

## 2019-12-30 NOTE — LETTER
Dentist Kip Lot  1990  [address] [place] [address]          12/30/19    We have had several requests from local dentist requesting permission to perform procedures on our patients who are pregnant  We wish to respond with this letter regarding some of the more routine procedures that we have been asked about  The following procedures may be performed on our obstetric patients:   1  Administration of local anesthesia   2  Administration of antibiotics such as PCN, Ampicillin, and Erythromycin  3  Administration of pain medications such as Tylenol, Tylenol with codeine, and if needed Percocet  4  Shielded X-rays    Should you have any questions, please do not hesitate to contact at [phone]          Sincerely,    [facility]  [phone]

## 2020-01-06 ENCOUNTER — ROUTINE PRENATAL (OUTPATIENT)
Dept: OBGYN CLINIC | Facility: CLINIC | Age: 30
End: 2020-01-06

## 2020-01-06 VITALS
HEART RATE: 88 BPM | BODY MASS INDEX: 25.78 KG/M2 | SYSTOLIC BLOOD PRESSURE: 108 MMHG | DIASTOLIC BLOOD PRESSURE: 70 MMHG | WEIGHT: 151 LBS | HEIGHT: 64 IN

## 2020-01-06 DIAGNOSIS — Z72.51 HIGH RISK HETEROSEXUAL BEHAVIOR: ICD-10-CM

## 2020-01-06 DIAGNOSIS — Z3A.15 15 WEEKS GESTATION OF PREGNANCY: ICD-10-CM

## 2020-01-06 DIAGNOSIS — Z11.3 SCREEN FOR STD (SEXUALLY TRANSMITTED DISEASE): Primary | ICD-10-CM

## 2020-01-06 PROCEDURE — 87591 N.GONORRHOEAE DNA AMP PROB: CPT | Performed by: STUDENT IN AN ORGANIZED HEALTH CARE EDUCATION/TRAINING PROGRAM

## 2020-01-06 PROCEDURE — 99213 OFFICE O/P EST LOW 20 MIN: CPT | Performed by: OBSTETRICS & GYNECOLOGY

## 2020-01-06 PROCEDURE — 87491 CHLMYD TRACH DNA AMP PROBE: CPT | Performed by: STUDENT IN AN ORGANIZED HEALTH CARE EDUCATION/TRAINING PROGRAM

## 2020-01-06 NOTE — PROGRESS NOTES
OB/GYN  PRENATAL H&P VISIT  Ike Bowles  2020  12:13 PM  Dr Tracy Mckenna MD      SUBJECTIVE  Patient is a  at 15w1d here for initial prenatal H&P  This is an intended and desired pregnancy  FOB is present  She is currently doing well  She is currently not working  She denies hx of STD/STI, denies a hx of TB or close contacts with persons with TB  She denies a family history of inheritable conditions such as physical or intellectual disabilities, birth defects, blood disorders, heart or neural tube defects  She denies recent travel or travel planned in the near future  She denies use of nicotine or recreational drug use  She denies vaginal bleeding, cramping, leakage, abnormal discharge  OB History    Para Term  AB Living   2 1 1 0 0 1   SAB TAB Ectopic Multiple Live Births   0 0 0 0 1      # Outcome Date GA Lbr Alex/2nd Weight Sex Delivery Anes PTL Lv   2 Current            1 Term 17 40w2d / 01:58 3835 g (8 lb 7 3 oz) M CS-LTranv EPI N JAVON      Complications: Fetal Intolerance      Name: Jason Donis: Ivanna 43: 8       Review of Systems   Constitutional: Negative for chills and fever  Eyes: Negative for visual disturbance  Respiratory: Negative for shortness of breath  Cardiovascular: Negative for chest pain  Gastrointestinal: Negative for constipation, diarrhea, nausea and vomiting  Genitourinary: Negative for pelvic pain, urgency, vaginal bleeding, vaginal discharge and vaginal pain  Neurological: Negative for headaches  Past Medical History:   Diagnosis Date    Maternal excessive weight gain     57 lbs    Pruritic urticarial papules and plaques of pregnancy (puppp) 2017    Varicella w/o complication childhood       Past Surgical History:   Procedure Laterality Date    MA  DELIVERY ONLY N/A 2017    Procedure:  SECTION ();   Surgeon: Saundra Tyler MD;  Location: [facility];  Service: Obstetrics       Social History     Socioeconomic History    Marital status: /Civil Union     Spouse name: Not on file    Number of children: Not on file    Years of education: Not on file    Highest education level: Not on file   Occupational History    Not on file   Social Needs    Financial resource strain: Not on file    Food insecurity:     Worry: Not on file     Inability: Not on file    Transportation needs:     Medical: Not on file     Non-medical: Not on file   Tobacco Use    Smoking status: Never Smoker    Smokeless tobacco: Never Used   Substance and Sexual Activity    Alcohol use: No    Drug use: No    Sexual activity: Yes     Partners: Male     Birth control/protection: None   Lifestyle    Physical activity:     Days per week: Not on file     Minutes per session: Not on file    Stress: Not on file   Relationships    Social connections:     Talks on phone: Not on file     Gets together: Not on file     Attends [employer] service: Not on file     Active member of club or organization: Not on file     Attends meetings of clubs or organizations: Not on file     Relationship status: Not on file    Intimate partner violence:     Fear of current or ex partner: Not on file     Emotionally abused: Not on file     Physically abused: Not on file     Forced sexual activity: Not on file   Other Topics Concern    Not on file   Social History Narrative    Not on file       OBJECTIVE  Vitals:    01/06/20 1200   BP: 108/70   Pulse: 88     Physical Exam   Constitutional: She is oriented to person, place, and time  She appears well-developed and well-nourished  Genitourinary:   Genitourinary Comments: Patient declined pelvic exam   Cardiovascular: Normal rate and regular rhythm  Pulmonary/Chest: Effort normal and breath sounds normal    Abdominal: Bowel sounds are normal  There is no tenderness  There is no guarding  Musculoskeletal: Normal range of motion     Neurological: She is alert and oriented to person, place, and time  Skin: Skin is warm and dry  Psychiatric: She has a normal mood and affect  Her behavior is normal        ASSESSMENT AND PLAN    30 y o , , with /70   Pulse 88   Ht 5' 4" (1 626 m)   Wt 68 5 kg (151 lb)   LMP 2019 , at 15w1d here for her prenatal H&P   by doppler    1  Pregnancy: H&P completed today  PN Labs did not complete - patient states she will complete today  Review next visit  Labor expectations discussed with patient, including appointment schedule, nutrition, exercise, medications, sexual intercourse, and nausea/vomiting  Patient's BMI is 25  92  Recommended weight gain is 25-35 pounds  2  Screening: Pap smear - patient declined at this time  Please revisit at next visit  Juan A Milligan GC/CT collected  Sequential screening reviewed with patient - patient declined due to financial cost    3  Consents: Delivery process including potential OVD and  reviewed  Sign delivery consent form at 28 weeks  4  Labor: For analgesia, patient is still uncertain whether she wants to [facility] or do an elective repeat  section  5  Postpartum: Patient plans on  breastfeeding  Different methods of contraception were discussed with patient, including progesterone only oral pills, depo provera, nexplanon, mirena, and paragard  Patient is undecided during postpartum phase  6  Follow up: RTC in four weeks  Precautions regarding labor, leakage, bleeding, and fetal movement reviewed      Erin Salinas MD  2020  12:13 PM

## 2020-01-07 LAB
C TRACH DNA SPEC QL NAA+PROBE: NEGATIVE
N GONORRHOEA DNA SPEC QL NAA+PROBE: NEGATIVE

## 2020-01-13 ENCOUNTER — ROUTINE PRENATAL (OUTPATIENT)
Dept: OBGYN CLINIC | Facility: CLINIC | Age: 30
End: 2020-01-13

## 2020-01-13 VITALS
TEMPERATURE: 99 F | DIASTOLIC BLOOD PRESSURE: 62 MMHG | BODY MASS INDEX: 25.78 KG/M2 | HEIGHT: 64 IN | HEART RATE: 97 BPM | SYSTOLIC BLOOD PRESSURE: 118 MMHG | WEIGHT: 151 LBS

## 2020-01-13 DIAGNOSIS — R19.7 NAUSEA VOMITING AND DIARRHEA: Primary | ICD-10-CM

## 2020-01-13 DIAGNOSIS — R11.2 NAUSEA VOMITING AND DIARRHEA: Primary | ICD-10-CM

## 2020-01-13 PROCEDURE — 99213 OFFICE O/P EST LOW 20 MIN: CPT | Performed by: OBSTETRICS & GYNECOLOGY

## 2020-01-13 RX ORDER — ONDANSETRON 4 MG/1
4 TABLET, ORALLY DISINTEGRATING ORAL EVERY 6 HOURS PRN
Qty: 20 TABLET | Refills: 0 | Status: SHIPPED | OUTPATIENT
Start: 2020-01-13 | End: 2020-06-21 | Stop reason: HOSPADM

## 2020-01-13 NOTE — PROGRESS NOTES
Assessment/Plan:   000307    Nausea, Vomiting & Diarrhea  -Likely 2/2 viral illness contracted from her son  Patient declined to be examined today  Discussed that if she was unable to eat or drink that she should go to the ED for IV hydration  She initially was agreeable but later declined and states that she will just drink water  We reviewed that she can take Tylenol and Zofran as needed  A rx for Zofran was sent to the patient's pharmacy  She was instructed to call the office if symptoms do not improve in 48-72 hours  Subjective:    Sheron Galan is a is a 31 yo  at 16w1d who presents today for a sick visit  She is here with her  who does all the communicating for her  She states that she has had abdominal pain, n/v and diarrhea that started this morning  Her son is 35 years old and has been sick since Friday  He had a virus with the same symptoms  She has not been able to eat anything today but is drinking water  She is concerned and wants to make sure that the "baby is ok"  Review of Systems   Constitutional: Positive for appetite change  Negative for chills, fatigue and fever  Respiratory: Negative for cough and shortness of breath  Cardiovascular: Negative for chest pain  Gastrointestinal: Positive for abdominal pain, diarrhea, nausea and vomiting  FHT: 150    Objective:     Physical Exam   Constitutional: She is oriented to person, place, and time  She appears well-developed and well-nourished  No distress  HENT:   Head: Normocephalic and atraumatic  Eyes: Pupils are equal, round, and reactive to light  Conjunctivae and EOM are normal    Cardiovascular: Normal rate, regular rhythm, normal heart sounds and intact distal pulses  Pulmonary/Chest: Effort normal and breath sounds normal  No respiratory distress  She has no wheezes  She exhibits no tenderness  Abdominal: Soft     Genitourinary:   Genitourinary Comments: Declines pelvic exam/ US Musculoskeletal: Normal range of motion  She exhibits no tenderness  Neurological: She is alert and oriented to person, place, and time  Skin: She is not diaphoretic  Psychiatric: She has a normal mood and affect   Her behavior is normal        Vitals:    01/13/20 1415   BP: 118/62   Pulse: 97   Temp: 99 °F (37 2 °C)     Roel Lucas MD, MPH  OB/GYN, PGY4  1/13/2020, 2:40 PM

## 2020-01-22 ENCOUNTER — APPOINTMENT (OUTPATIENT)
Dept: LAB | Facility: HOSPITAL | Age: 30
End: 2020-01-22

## 2020-01-22 DIAGNOSIS — Z34.92 PREGNANT AND NOT YET DELIVERED IN SECOND TRIMESTER: ICD-10-CM

## 2020-01-22 DIAGNOSIS — Z78.9 LANGUAGE BARRIER: ICD-10-CM

## 2020-01-22 DIAGNOSIS — Z3A.14 14 WEEKS GESTATION OF PREGNANCY: ICD-10-CM

## 2020-01-22 LAB
ABO GROUP BLD: NORMAL
BACTERIA UR QL AUTO: ABNORMAL /HPF
BASOPHILS # BLD AUTO: 0.05 THOUSANDS/ΜL (ref 0–0.1)
BASOPHILS NFR BLD AUTO: 0 % (ref 0–1)
BILIRUB UR QL STRIP: NEGATIVE
BLD GP AB SCN SERPL QL: NEGATIVE
CLARITY UR: ABNORMAL
COLOR UR: YELLOW
EOSINOPHIL # BLD AUTO: 0.48 THOUSAND/ΜL (ref 0–0.61)
EOSINOPHIL NFR BLD AUTO: 4 % (ref 0–6)
ERYTHROCYTE [DISTWIDTH] IN BLOOD BY AUTOMATED COUNT: 11.7 % (ref 11.6–15.1)
GLUCOSE UR STRIP-MCNC: NEGATIVE MG/DL
HCT VFR BLD AUTO: 36.7 % (ref 34.8–46.1)
HGB BLD-MCNC: 12.3 G/DL (ref 11.5–15.4)
HGB UR QL STRIP.AUTO: NEGATIVE
HYALINE CASTS #/AREA URNS LPF: ABNORMAL /LPF
IMM GRANULOCYTES # BLD AUTO: 0.18 THOUSAND/UL (ref 0–0.2)
IMM GRANULOCYTES NFR BLD AUTO: 2 % (ref 0–2)
KETONES UR STRIP-MCNC: NEGATIVE MG/DL
LEUKOCYTE ESTERASE UR QL STRIP: ABNORMAL
LYMPHOCYTES # BLD AUTO: 2.11 THOUSANDS/ΜL (ref 0.6–4.47)
LYMPHOCYTES NFR BLD AUTO: 18 % (ref 14–44)
MCH RBC QN AUTO: 29.4 PG (ref 26.8–34.3)
MCHC RBC AUTO-ENTMCNC: 33.5 G/DL (ref 31.4–37.4)
MCV RBC AUTO: 88 FL (ref 82–98)
MONOCYTES # BLD AUTO: 0.73 THOUSAND/ΜL (ref 0.17–1.22)
MONOCYTES NFR BLD AUTO: 6 % (ref 4–12)
NEUTROPHILS # BLD AUTO: 8.48 THOUSANDS/ΜL (ref 1.85–7.62)
NEUTS SEG NFR BLD AUTO: 70 % (ref 43–75)
NITRITE UR QL STRIP: NEGATIVE
NON-SQ EPI CELLS URNS QL MICRO: ABNORMAL /HPF
NRBC BLD AUTO-RTO: 0 /100 WBCS
PH UR STRIP.AUTO: 7 [PH]
PLATELET # BLD AUTO: 251 THOUSANDS/UL (ref 149–390)
PMV BLD AUTO: 9.4 FL (ref 8.9–12.7)
PROT UR STRIP-MCNC: NEGATIVE MG/DL
RBC # BLD AUTO: 4.18 MILLION/UL (ref 3.81–5.12)
RBC #/AREA URNS AUTO: ABNORMAL /HPF
RH BLD: POSITIVE
SP GR UR STRIP.AUTO: 1.01 (ref 1–1.03)
SPECIMEN EXPIRATION DATE: NORMAL
UROBILINOGEN UR QL STRIP.AUTO: 0.2 E.U./DL
WBC # BLD AUTO: 12.03 THOUSAND/UL (ref 4.31–10.16)
WBC #/AREA URNS AUTO: ABNORMAL /HPF

## 2020-01-22 PROCEDURE — 36415 COLL VENOUS BLD VENIPUNCTURE: CPT

## 2020-01-22 PROCEDURE — 80081 OBSTETRIC PANEL INC HIV TSTG: CPT

## 2020-01-22 PROCEDURE — 87086 URINE CULTURE/COLONY COUNT: CPT

## 2020-01-22 PROCEDURE — 81001 URINALYSIS AUTO W/SCOPE: CPT

## 2020-01-23 LAB
BACTERIA UR CULT: NORMAL
HIV 1+2 AB+HIV1 P24 AG SERPL QL IA: NORMAL
RPR SER QL: NORMAL

## 2020-01-24 LAB
HBV SURFACE AG SER QL: NORMAL
RUBV IGG SERPL IA-ACNC: >175 IU/ML

## 2020-02-03 ENCOUNTER — ROUTINE PRENATAL (OUTPATIENT)
Dept: OBGYN CLINIC | Facility: CLINIC | Age: 30
End: 2020-02-03

## 2020-02-03 VITALS
HEART RATE: 91 BPM | SYSTOLIC BLOOD PRESSURE: 107 MMHG | DIASTOLIC BLOOD PRESSURE: 72 MMHG | BODY MASS INDEX: 26.63 KG/M2 | HEIGHT: 64 IN | WEIGHT: 156 LBS

## 2020-02-03 DIAGNOSIS — Z3A.19 19 WEEKS GESTATION OF PREGNANCY: Primary | ICD-10-CM

## 2020-02-03 PROCEDURE — 99213 OFFICE O/P EST LOW 20 MIN: CPT | Performed by: OBSTETRICS & GYNECOLOGY

## 2020-02-03 NOTE — PROGRESS NOTES
Cate Joshi is a 30 y o   at 19w1d presenting for a routine prenatal appointment  She reports sneezing, some round ligament pain with sneezing  She has not tried any medications to help with her sneezing, nor with the lateral abdominal pain  Otherwise pt is without complaint  Endorses good fetal movement  Denies LOF, VB, cramping, contractions  Vitals:    20 1320   BP: 107/72   Pulse: 91     Fundal height: Umbilicus  FHR: 807DFH    1  Pregnancy:  - Continue routine prenatal care  - Pt declines influenza vaccination today   I discussed the benefits of vaccination; pt declines siting cost   - Common discomforts of pregnancy discussed with pt  - Recommended non-drowsy anti-histamines to help with sneezing and rhinorrhea  - Discussed Tylenol for conservative methods of pain management, and maternity belt for abdominal discomfort and pressure  - Level II US scheduled for 2/10, encouraged pt to keep appt  - Return to clinic for routine prenatal appointment in 4 weeks    Discussed with Dr Joselyn Donis,   OB/GYN, PGY4  2/3/2020, 1:38 PM

## 2020-02-06 ENCOUNTER — PATIENT OUTREACH (OUTPATIENT)
Dept: OBGYN CLINIC | Facility: CLINIC | Age: 30
End: 2020-02-06

## 2020-02-08 NOTE — PATIENT INSTRUCTIONS
Thank you for choosing us for your  care today  If you have any questions about your ultrasound or care, please do not hesitate to contact us or your primary obstetrician  Some general instructions for your pregnancy are:     Exercise: we encourage most pregnant women to get regular physical activity in pregnancy  Exercise has been shown to reduce the risk of several pregnancy-related complications  Unless instructed otherwise, you can aim for 22 minutes per day (150 minutes per week! )   Nutrition: aim for calcium-rich and iron-rich foods as well as healthy sources of protein   Weight: ask your doctor what is the appropriate amount of weight for you to gain in pregnancy  We have nutritionists here if you would like to meet with them   Protect against the flu: get yourself and your entire household vaccinated against influenza  Tell your partner to get vaccinated as well  Good hand hygiene can reduce the spread of this potentially deadly virus  Insist that everyone who is going to hold or be around your baby get vaccinated   Learn about Preeclampsia: preeclampsia is a common, serious complication in pregnancy  A blood pressure of 140mmHg (top number or systolic) OR 66WMBO (bottom number or diastolic) is elevated and needs evaluation by your doctor  Ask your doctor early in pregnancy if you should take aspirin (not motrin or tylenol) to prevent preeclampsia  If you were advised to take aspirin to prevent preeclampsia, a daily dose of 162mg or 81mg is advised  One resource to learn more is www  preeclampsia org    If you smoke, try to reduce how many cigarettes you smoke or quit completely  Do not vape   Other warning signs to watch out for in pregnancy or postpartum: chest pain, obstructed breathing or shortness of breath, seizures, thoughts of hurting yourself or your baby, bleeding, a painful or swollen leg, fever, or headache (AWHONN POST-BIRTH Warning Signs campaign)    If these happen call 911  Itching is also not normal in pregnancy and if you experience this, especially over your hands and feet, potentially worse at night, notify your doctors

## 2020-02-10 ENCOUNTER — ROUTINE PRENATAL (OUTPATIENT)
Dept: PERINATAL CARE | Facility: OTHER | Age: 30
End: 2020-02-10

## 2020-02-10 VITALS
BODY MASS INDEX: 27.52 KG/M2 | HEIGHT: 64 IN | HEART RATE: 91 BPM | WEIGHT: 161.2 LBS | SYSTOLIC BLOOD PRESSURE: 112 MMHG | DIASTOLIC BLOOD PRESSURE: 73 MMHG

## 2020-02-10 DIAGNOSIS — O34.219 HISTORY OF CESAREAN DELIVERY, ANTEPARTUM: ICD-10-CM

## 2020-02-10 DIAGNOSIS — Z36.3 ENCOUNTER FOR ANTENATAL SCREENING FOR MALFORMATIONS: Primary | ICD-10-CM

## 2020-02-10 DIAGNOSIS — Z34.92 PREGNANT AND NOT YET DELIVERED IN SECOND TRIMESTER: ICD-10-CM

## 2020-02-10 PROCEDURE — 76805 OB US >/= 14 WKS SNGL FETUS: CPT | Performed by: OBSTETRICS & GYNECOLOGY

## 2020-02-10 NOTE — PROGRESS NOTES
[address]: Ms Edu Wilburn was seen today at 20w1d for anatomic survey and cervical length screening ultrasound  See ultrasound report under "OB Procedures" tab  Please don't hesitate to contact our office with any concerns or questions    Denis Lozano MD

## 2020-02-12 NOTE — PROGRESS NOTES
SW spoke with Pt's spouse whom interpreted for Pt  She is 31 y/o-M- Citizen of [place] non english speaking  Couple states pregnancy is welcome  Pt has [address] [facility]  Pt denies nay question or concern  SW explained her role and encouraged couple to call at any time needed

## 2020-03-02 ENCOUNTER — ROUTINE PRENATAL (OUTPATIENT)
Dept: OBGYN CLINIC | Facility: CLINIC | Age: 30
End: 2020-03-02

## 2020-03-02 VITALS
HEIGHT: 64 IN | SYSTOLIC BLOOD PRESSURE: 122 MMHG | WEIGHT: 161 LBS | DIASTOLIC BLOOD PRESSURE: 76 MMHG | BODY MASS INDEX: 27.49 KG/M2 | HEART RATE: 116 BPM

## 2020-03-02 DIAGNOSIS — Z3A.23 23 WEEKS GESTATION OF PREGNANCY: Primary | ICD-10-CM

## 2020-03-02 PROCEDURE — 99213 OFFICE O/P EST LOW 20 MIN: CPT | Performed by: OBSTETRICS & GYNECOLOGY

## 2020-03-02 NOTE — PROGRESS NOTES
Sawyer Avitia is a 30 y o  female being seen today for her obstetrical visit  She is at 23w1d gestation  Patient reports no complaints  Fetal movement: normal     Menstrual History:  OB History        2    Para   1    Term   1            AB        Living   1       SAB        TAB        Ectopic        Multiple   0    Live Births   1                  Patient's last menstrual period was 2019  The following portions of the patient's history were reviewed and updated as appropriate: allergies, current medications, past family history, past medical history, past social history, past surgical history and problem list     Review of Systems  Pertinent items are noted in HPI  Objective      /76   Pulse (!) 116   Ht 5' 4" (1 626 m)   Wt 73 kg (161 lb)   LMP 2019   BMI 27 64 kg/m²   FHT: 155 BPM   Uterine Size: size equals dates        Assessment     30year old  with a Pregnancy 23 and 1/7 weeks     Plan      Routine prenatal care  Patient continues to decline influenza vaccine  IUP at 23 weeks  -28 week lab slips given  Prior LTCS in 2017 for NRFHT  -patient desires TOLAC  -patient was extensively counseled by Dr Tyler Peterson, a native of [facility] [place] about the risks and benefits of TOLAC  Her  success calculator puts her at 71 %  As of today she is planning on a TOLAC, but understands that she may decide to change her mind at any time during her prenatal course or labor course  : discussed and planned  Follow up in 4 weeks

## 2020-03-06 ENCOUNTER — APPOINTMENT (OUTPATIENT)
Dept: LAB | Facility: HOSPITAL | Age: 30
End: 2020-03-06

## 2020-03-06 DIAGNOSIS — Z3A.23 23 WEEKS GESTATION OF PREGNANCY: ICD-10-CM

## 2020-03-06 LAB
BASOPHILS # BLD AUTO: 0.05 THOUSANDS/ΜL (ref 0–0.1)
BASOPHILS NFR BLD AUTO: 1 % (ref 0–1)
EOSINOPHIL # BLD AUTO: 0.34 THOUSAND/ΜL (ref 0–0.61)
EOSINOPHIL NFR BLD AUTO: 3 % (ref 0–6)
ERYTHROCYTE [DISTWIDTH] IN BLOOD BY AUTOMATED COUNT: 12.3 % (ref 11.6–15.1)
GLUCOSE 1H P 50 G GLC PO SERPL-MCNC: 133 MG/DL
HCT VFR BLD AUTO: 36.9 % (ref 34.8–46.1)
HGB BLD-MCNC: 12 G/DL (ref 11.5–15.4)
IMM GRANULOCYTES # BLD AUTO: 0.15 THOUSAND/UL (ref 0–0.2)
IMM GRANULOCYTES NFR BLD AUTO: 1 % (ref 0–2)
LYMPHOCYTES # BLD AUTO: 1.89 THOUSANDS/ΜL (ref 0.6–4.47)
LYMPHOCYTES NFR BLD AUTO: 17 % (ref 14–44)
MCH RBC QN AUTO: 29.5 PG (ref 26.8–34.3)
MCHC RBC AUTO-ENTMCNC: 32.5 G/DL (ref 31.4–37.4)
MCV RBC AUTO: 91 FL (ref 82–98)
MONOCYTES # BLD AUTO: 0.57 THOUSAND/ΜL (ref 0.17–1.22)
MONOCYTES NFR BLD AUTO: 5 % (ref 4–12)
NEUTROPHILS # BLD AUTO: 8.1 THOUSANDS/ΜL (ref 1.85–7.62)
NEUTS SEG NFR BLD AUTO: 73 % (ref 43–75)
NRBC BLD AUTO-RTO: 0 /100 WBCS
PLATELET # BLD AUTO: 211 THOUSANDS/UL (ref 149–390)
PMV BLD AUTO: 9.5 FL (ref 8.9–12.7)
RBC # BLD AUTO: 4.07 MILLION/UL (ref 3.81–5.12)
WBC # BLD AUTO: 11.1 THOUSAND/UL (ref 4.31–10.16)

## 2020-03-06 PROCEDURE — 85025 COMPLETE CBC W/AUTO DIFF WBC: CPT

## 2020-03-06 PROCEDURE — 86592 SYPHILIS TEST NON-TREP QUAL: CPT

## 2020-03-06 PROCEDURE — 82950 GLUCOSE TEST: CPT

## 2020-03-06 PROCEDURE — 36415 COLL VENOUS BLD VENIPUNCTURE: CPT

## 2020-03-09 LAB — RPR SER QL: NORMAL

## 2020-03-27 ENCOUNTER — TELEPHONE (OUTPATIENT)
Dept: OBGYN CLINIC | Facility: CLINIC | Age: 30
End: 2020-03-27

## 2020-03-30 ENCOUNTER — TELEMEDICINE (OUTPATIENT)
Dept: OBGYN CLINIC | Facility: CLINIC | Age: 30
End: 2020-03-30

## 2020-03-30 DIAGNOSIS — Z3A.27 27 WEEKS GESTATION OF PREGNANCY: Primary | ICD-10-CM

## 2020-03-30 PROCEDURE — G2012 BRIEF CHECK IN BY MD/QHP: HCPCS | Performed by: OBSTETRICS & GYNECOLOGY

## 2020-03-30 NOTE — PROGRESS NOTES
Virtual Brief Visit    Reason for visit is prenatal visit    Encounter provider Nini Ballard MD    Provider located at [address] [place] [id number] [phone]    After connecting through telephone, the patient was identified by name and date of birth  Julian Mosher was informed that this is a telemedicine visit and that the visit is being conducted through telephone  My office door was closed  No one else was in the room  She acknowledged consent and understanding of privacy and security of the video platform  The patient has agreed to participate and understands they can discontinue the visit at any time  [facility] [place] translation provided by [employer] #[id number]    Patient is aware this is a billable service  Markell Dwyer is a 30 y o  female  at 27w1d who was originally scheduled for an in person prenatal visit  She did not want to come in due to concerns over [address] so we turned it in a telemedicine virtual visit instead  She is currently doing well  She denies cramping, vaginal bleeding, leakage of fluid, decreased fetal movement  She has no complaints today  Past Medical History:   Diagnosis Date    Maternal excessive weight gain     57 lbs    Pruritic urticarial papules and plaques of pregnancy (puppp) 2017    Varicella w/o complication childhood       Past Surgical History:   Procedure Laterality Date    CA  DELIVERY ONLY N/A 2017    Procedure:  SECTION ();   Surgeon: Javier Tyler MD;  Location: [facility];  Service: Obstetrics       Current Outpatient Medications   Medication Sig Dispense Refill    acetaminophen (TYLENOL) 325 mg tablet Take 650 mg by mouth every 6 (six) hours as needed for mild pain      Calcium Carbonate-Vitamin D (CALCIUM 500/D PO) Take by mouth      Multiple Vitamin (MULTIVITAMIN) capsule Take 1 capsule by mouth daily      Omega-3 Fatty Acids (FISH OIL BURP-LESS PO) Take by mouth      ondansetron (ZOFRAN-ODT) 4 mg disintegrating tablet Take 1 tablet (4 mg total) by mouth every 6 (six) hours as needed for nausea or vomiting (Patient not taking: Reported on 2/10/2020) 20 tablet 0     No current facility-administered medications for this visit  No Known Allergies    Plan    Patient is a 30year old  at 27w1d here for prenatal visit  I informed the patient that [facility] is temporarily closed  If she has any obstetric concerns and would like to be evaluated, she should call the office to schedule an appointment  If it is urgent or overnight, she should call [employer] to speak with her OB doctor who will be able to evaluate her and determine need for evaluation on L&D  Reviewed locations of both [facility] and [address] [place] [address] with patient - from the address she provided, [address] [place] [address] is the closer campus  IUP at 27w1d  No obstetric complaints today  [facility] 20  Flu vaccine refused previously  Contraception: condom use  Breastfeeding: yes  Discussed  labor precautions and fetal kick counts      History of 1LTCS  Patient is unsure of whether she would like to TOLAC vs RLTCS  Patient was previously counseled by Dr Saint Kohler (please see 56 note from Dr Jordan Dean) about risks and benefits of TOLAC  She would like to wait for ultrasound and EFW before deciding on route of delivery  Continue to discuss  Her last in person prenatal visit was on 3/2/2020 at 23w1d  Because she turned this appointment from an in person prenatal visit to a virtual visit, she should return to [facility] in 2 weeks for in person visit at 33 weeks  I spent 20 minutes with the patient during this visit      Duran Burkett MD  OBGYN, PGY-3  3/30/2020  1:45 PM

## 2020-04-08 ENCOUNTER — TELEPHONE (OUTPATIENT)
Dept: OBGYN CLINIC | Facility: CLINIC | Age: 30
End: 2020-04-08

## 2020-04-15 ENCOUNTER — ROUTINE PRENATAL (OUTPATIENT)
Dept: OBGYN CLINIC | Facility: CLINIC | Age: 30
End: 2020-04-15

## 2020-04-15 VITALS
WEIGHT: 173 LBS | BODY MASS INDEX: 29.53 KG/M2 | HEIGHT: 64 IN | TEMPERATURE: 97.5 F | HEART RATE: 98 BPM | DIASTOLIC BLOOD PRESSURE: 77 MMHG | SYSTOLIC BLOOD PRESSURE: 118 MMHG

## 2020-04-15 DIAGNOSIS — Z23 NEED FOR DIPHTHERIA-TETANUS-PERTUSSIS (TDAP) VACCINE: Primary | ICD-10-CM

## 2020-04-15 PROBLEM — Z3A.29 29 WEEKS GESTATION OF PREGNANCY: Status: ACTIVE | Noted: 2019-12-16

## 2020-04-15 PROCEDURE — 99213 OFFICE O/P EST LOW 20 MIN: CPT | Performed by: OBSTETRICS & GYNECOLOGY

## 2020-04-15 PROCEDURE — 1036F TOBACCO NON-USER: CPT | Performed by: OBSTETRICS & GYNECOLOGY

## 2020-04-15 PROCEDURE — 90715 TDAP VACCINE 7 YRS/> IM: CPT | Performed by: OBSTETRICS & GYNECOLOGY

## 2020-04-15 PROCEDURE — 90471 IMMUNIZATION ADMIN: CPT | Performed by: OBSTETRICS & GYNECOLOGY

## 2020-04-16 ENCOUNTER — TELEPHONE (OUTPATIENT)
Dept: INTERNAL MEDICINE CLINIC | Facility: CLINIC | Age: 30
End: 2020-04-16

## 2020-04-29 ENCOUNTER — TELEMEDICINE (OUTPATIENT)
Dept: OBGYN CLINIC | Facility: CLINIC | Age: 30
End: 2020-04-29

## 2020-04-29 DIAGNOSIS — Z3A.31 31 WEEKS GESTATION OF PREGNANCY: Primary | ICD-10-CM

## 2020-04-29 DIAGNOSIS — O34.211 MATERNAL CARE DUE TO LOW TRANSVERSE UTERINE SCAR FROM PREVIOUS CESAREAN DELIVERY: ICD-10-CM

## 2020-04-29 PROCEDURE — G2012 BRIEF CHECK IN BY MD/QHP: HCPCS | Performed by: OBSTETRICS & GYNECOLOGY

## 2020-05-08 ENCOUNTER — TELEPHONE (OUTPATIENT)
Dept: PERINATAL CARE | Facility: CLINIC | Age: 30
End: 2020-05-08

## 2020-05-11 ENCOUNTER — ULTRASOUND (OUTPATIENT)
Dept: PERINATAL CARE | Facility: OTHER | Age: 30
End: 2020-05-11

## 2020-05-11 ENCOUNTER — TELEPHONE (OUTPATIENT)
Dept: PERINATAL CARE | Facility: OTHER | Age: 30
End: 2020-05-11

## 2020-05-11 VITALS
HEART RATE: 92 BPM | SYSTOLIC BLOOD PRESSURE: 112 MMHG | DIASTOLIC BLOOD PRESSURE: 77 MMHG | HEIGHT: 64 IN | WEIGHT: 174.6 LBS | TEMPERATURE: 97.8 F | BODY MASS INDEX: 29.81 KG/M2

## 2020-05-11 DIAGNOSIS — O34.211 MATERNAL CARE DUE TO LOW TRANSVERSE UTERINE SCAR FROM PREVIOUS CESAREAN DELIVERY: ICD-10-CM

## 2020-05-11 DIAGNOSIS — Z3A.33 33 WEEKS GESTATION OF PREGNANCY: ICD-10-CM

## 2020-05-11 DIAGNOSIS — O32.2XX9: Primary | ICD-10-CM

## 2020-05-11 PROCEDURE — 76816 OB US FOLLOW-UP PER FETUS: CPT | Performed by: OBSTETRICS & GYNECOLOGY

## 2020-05-11 PROCEDURE — 99213 OFFICE O/P EST LOW 20 MIN: CPT | Performed by: OBSTETRICS & GYNECOLOGY

## 2020-05-14 ENCOUNTER — ROUTINE PRENATAL (OUTPATIENT)
Dept: OBGYN CLINIC | Facility: CLINIC | Age: 30
End: 2020-05-14

## 2020-05-14 ENCOUNTER — TELEPHONE (OUTPATIENT)
Dept: OBGYN CLINIC | Facility: CLINIC | Age: 30
End: 2020-05-14

## 2020-05-14 ENCOUNTER — APPOINTMENT (OUTPATIENT)
Dept: LAB | Facility: HOSPITAL | Age: 30
End: 2020-05-14

## 2020-05-14 VITALS
SYSTOLIC BLOOD PRESSURE: 120 MMHG | TEMPERATURE: 98.4 F | DIASTOLIC BLOOD PRESSURE: 76 MMHG | BODY MASS INDEX: 30.05 KG/M2 | WEIGHT: 176 LBS | HEART RATE: 98 BPM | HEIGHT: 64 IN

## 2020-05-14 DIAGNOSIS — L29.9 ITCHING: ICD-10-CM

## 2020-05-14 DIAGNOSIS — O34.211 MATERNAL CARE DUE TO LOW TRANSVERSE UTERINE SCAR FROM PREVIOUS CESAREAN DELIVERY: Primary | ICD-10-CM

## 2020-05-14 DIAGNOSIS — Z3A.33 33 WEEKS GESTATION OF PREGNANCY: ICD-10-CM

## 2020-05-14 DIAGNOSIS — O32.2XX9: ICD-10-CM

## 2020-05-14 LAB
ALBUMIN SERPL BCP-MCNC: 2.6 G/DL (ref 3.5–5)
ALP SERPL-CCNC: 130 U/L (ref 46–116)
ALT SERPL W P-5'-P-CCNC: 12 U/L (ref 12–78)
ANION GAP SERPL CALCULATED.3IONS-SCNC: 8 MMOL/L (ref 4–13)
AST SERPL W P-5'-P-CCNC: 10 U/L (ref 5–45)
BILIRUB SERPL-MCNC: 0.14 MG/DL (ref 0.2–1)
BUN SERPL-MCNC: 7 MG/DL (ref 5–25)
CALCIUM SERPL-MCNC: 8.9 MG/DL (ref 8.3–10.1)
CHLORIDE SERPL-SCNC: 106 MMOL/L (ref 100–108)
CO2 SERPL-SCNC: 22 MMOL/L (ref 21–32)
CREAT SERPL-MCNC: 0.58 MG/DL (ref 0.6–1.3)
GFR SERPL CREATININE-BSD FRML MDRD: 123 ML/MIN/1.73SQ M
GLUCOSE P FAST SERPL-MCNC: 97 MG/DL (ref 65–99)
POTASSIUM SERPL-SCNC: 4.3 MMOL/L (ref 3.5–5.3)
PROT SERPL-MCNC: 7.1 G/DL (ref 6.4–8.2)
SL AMB  POCT GLUCOSE, UA: NEGATIVE
SL AMB POCT URINE PROTEIN: NEGATIVE
SODIUM SERPL-SCNC: 136 MMOL/L (ref 136–145)

## 2020-05-14 PROCEDURE — 80053 COMPREHEN METABOLIC PANEL: CPT

## 2020-05-14 PROCEDURE — 36415 COLL VENOUS BLD VENIPUNCTURE: CPT

## 2020-05-14 PROCEDURE — 99213 OFFICE O/P EST LOW 20 MIN: CPT | Performed by: NURSE PRACTITIONER

## 2020-05-14 PROCEDURE — 82240 BILE ACIDS CHOLYLGLYCINE: CPT

## 2020-05-14 PROCEDURE — 81002 URINALYSIS NONAUTO W/O SCOPE: CPT | Performed by: NURSE PRACTITIONER

## 2020-05-19 LAB — BILE AC SERPL-SCNC: 3.4 UMOL/L (ref 0–10)

## 2020-05-27 ENCOUNTER — TELEPHONE (OUTPATIENT)
Dept: OBGYN CLINIC | Facility: CLINIC | Age: 30
End: 2020-05-27

## 2020-05-28 ENCOUNTER — ROUTINE PRENATAL (OUTPATIENT)
Dept: OBGYN CLINIC | Facility: CLINIC | Age: 30
End: 2020-05-28

## 2020-05-28 VITALS
DIASTOLIC BLOOD PRESSURE: 68 MMHG | TEMPERATURE: 98.1 F | SYSTOLIC BLOOD PRESSURE: 134 MMHG | HEIGHT: 64 IN | WEIGHT: 180 LBS | HEART RATE: 97 BPM | BODY MASS INDEX: 30.73 KG/M2

## 2020-05-28 DIAGNOSIS — O34.211 MATERNAL CARE DUE TO LOW TRANSVERSE UTERINE SCAR FROM PREVIOUS CESAREAN DELIVERY: ICD-10-CM

## 2020-05-28 DIAGNOSIS — Z3A.35 35 WEEKS GESTATION OF PREGNANCY: Primary | ICD-10-CM

## 2020-05-28 PROCEDURE — 99213 OFFICE O/P EST LOW 20 MIN: CPT | Performed by: STUDENT IN AN ORGANIZED HEALTH CARE EDUCATION/TRAINING PROGRAM

## 2020-06-04 ENCOUNTER — ROUTINE PRENATAL (OUTPATIENT)
Dept: OBGYN CLINIC | Facility: CLINIC | Age: 30
End: 2020-06-04

## 2020-06-04 VITALS
SYSTOLIC BLOOD PRESSURE: 116 MMHG | BODY MASS INDEX: 30.9 KG/M2 | DIASTOLIC BLOOD PRESSURE: 75 MMHG | HEIGHT: 64 IN | WEIGHT: 181 LBS | HEART RATE: 106 BPM | TEMPERATURE: 98.2 F

## 2020-06-04 DIAGNOSIS — Z3A.36 36 WEEKS GESTATION OF PREGNANCY: Primary | ICD-10-CM

## 2020-06-04 PROCEDURE — 1036F TOBACCO NON-USER: CPT | Performed by: STUDENT IN AN ORGANIZED HEALTH CARE EDUCATION/TRAINING PROGRAM

## 2020-06-04 PROCEDURE — 99213 OFFICE O/P EST LOW 20 MIN: CPT | Performed by: STUDENT IN AN ORGANIZED HEALTH CARE EDUCATION/TRAINING PROGRAM

## 2020-06-04 PROCEDURE — 87653 STREP B DNA AMP PROBE: CPT | Performed by: OBSTETRICS & GYNECOLOGY

## 2020-06-06 LAB — GP B STREP DNA SPEC QL NAA+PROBE: ABNORMAL

## 2020-06-10 ENCOUNTER — TELEPHONE (OUTPATIENT)
Dept: PERINATAL CARE | Facility: CLINIC | Age: 30
End: 2020-06-10

## 2020-06-11 ENCOUNTER — ULTRASOUND (OUTPATIENT)
Dept: PERINATAL CARE | Facility: OTHER | Age: 30
End: 2020-06-11
Payer: COMMERCIAL

## 2020-06-11 VITALS
TEMPERATURE: 97 F | HEART RATE: 86 BPM | BODY MASS INDEX: 31.41 KG/M2 | DIASTOLIC BLOOD PRESSURE: 82 MMHG | WEIGHT: 184 LBS | HEIGHT: 64 IN | SYSTOLIC BLOOD PRESSURE: 122 MMHG

## 2020-06-11 DIAGNOSIS — Z3A.37 37 WEEKS GESTATION OF PREGNANCY: ICD-10-CM

## 2020-06-11 DIAGNOSIS — O34.211 MATERNAL CARE DUE TO LOW TRANSVERSE UTERINE SCAR FROM PREVIOUS CESAREAN DELIVERY: Primary | ICD-10-CM

## 2020-06-11 PROCEDURE — 76816 OB US FOLLOW-UP PER FETUS: CPT | Performed by: OBSTETRICS & GYNECOLOGY

## 2020-06-11 PROCEDURE — 1036F TOBACCO NON-USER: CPT | Performed by: OBSTETRICS & GYNECOLOGY

## 2020-06-11 PROCEDURE — 99214 OFFICE O/P EST MOD 30 MIN: CPT | Performed by: OBSTETRICS & GYNECOLOGY

## 2020-06-12 ENCOUNTER — TELEPHONE (OUTPATIENT)
Dept: OBGYN CLINIC | Facility: CLINIC | Age: 30
End: 2020-06-12

## 2020-06-12 ENCOUNTER — ROUTINE PRENATAL (OUTPATIENT)
Dept: OBGYN CLINIC | Facility: CLINIC | Age: 30
End: 2020-06-12

## 2020-06-12 VITALS
WEIGHT: 184.6 LBS | HEART RATE: 99 BPM | TEMPERATURE: 97.2 F | DIASTOLIC BLOOD PRESSURE: 70 MMHG | SYSTOLIC BLOOD PRESSURE: 127 MMHG | HEIGHT: 64 IN | BODY MASS INDEX: 31.51 KG/M2

## 2020-06-12 DIAGNOSIS — Z3A.37 37 WEEKS GESTATION OF PREGNANCY: Primary | ICD-10-CM

## 2020-06-12 PROCEDURE — 99213 OFFICE O/P EST LOW 20 MIN: CPT | Performed by: OBSTETRICS & GYNECOLOGY

## 2020-06-18 ENCOUNTER — ROUTINE PRENATAL (OUTPATIENT)
Dept: OBGYN CLINIC | Facility: CLINIC | Age: 30
End: 2020-06-18

## 2020-06-18 ENCOUNTER — HOSPITAL ENCOUNTER (INPATIENT)
Facility: HOSPITAL | Age: 30
LOS: 3 days | Discharge: HOME | DRG: 540 | End: 2020-06-21
Attending: OBSTETRICS & GYNECOLOGY | Admitting: OBSTETRICS & GYNECOLOGY
Payer: COMMERCIAL

## 2020-06-18 ENCOUNTER — ANESTHESIA (INPATIENT)
Dept: LABOR AND DELIVERY | Facility: HOSPITAL | Age: 30
DRG: 540 | End: 2020-06-18
Payer: COMMERCIAL

## 2020-06-18 VITALS
HEIGHT: 64 IN | DIASTOLIC BLOOD PRESSURE: 77 MMHG | BODY MASS INDEX: 31.41 KG/M2 | WEIGHT: 184 LBS | HEART RATE: 96 BPM | SYSTOLIC BLOOD PRESSURE: 113 MMHG | TEMPERATURE: 98 F

## 2020-06-18 DIAGNOSIS — Z3A.38 38 WEEKS GESTATION OF PREGNANCY: Primary | ICD-10-CM

## 2020-06-18 DIAGNOSIS — Z98.891 STATUS POST REPEAT LOW TRANSVERSE CESAREAN SECTION: ICD-10-CM

## 2020-06-18 LAB
ABO GROUP BLD: NORMAL
BASE EXCESS BLDCOA CALC-SCNC: 0.3 MMOL/L (ref 3–11)
BASE EXCESS BLDCOV CALC-SCNC: -3.4 MMOL/L (ref 1–9)
BASOPHILS # BLD AUTO: 0.06 THOUSANDS/ΜL (ref 0–0.1)
BASOPHILS NFR BLD AUTO: 1 % (ref 0–1)
BLD GP AB SCN SERPL QL: NEGATIVE
EOSINOPHIL # BLD AUTO: 0.22 THOUSAND/ΜL (ref 0–0.61)
EOSINOPHIL NFR BLD AUTO: 2 % (ref 0–6)
ERYTHROCYTE [DISTWIDTH] IN BLOOD BY AUTOMATED COUNT: 14 % (ref 11.6–15.1)
HCO3 BLDCOA-SCNC: 28.4 MMOL/L (ref 17.3–27.3)
HCO3 BLDCOV-SCNC: 22.4 MMOL/L (ref 12.2–28.6)
HCT VFR BLD AUTO: 40.2 % (ref 34.8–46.1)
HGB BLD-MCNC: 13.3 G/DL (ref 11.5–15.4)
IMM GRANULOCYTES # BLD AUTO: 0.13 THOUSAND/UL (ref 0–0.2)
IMM GRANULOCYTES NFR BLD AUTO: 1 % (ref 0–2)
LYMPHOCYTES # BLD AUTO: 1.92 THOUSANDS/ΜL (ref 0.6–4.47)
LYMPHOCYTES NFR BLD AUTO: 18 % (ref 14–44)
MCH RBC QN AUTO: 29.1 PG (ref 26.8–34.3)
MCHC RBC AUTO-ENTMCNC: 33.1 G/DL (ref 31.4–37.4)
MCV RBC AUTO: 88 FL (ref 82–98)
MONOCYTES # BLD AUTO: 0.68 THOUSAND/ΜL (ref 0.17–1.22)
MONOCYTES NFR BLD AUTO: 7 % (ref 4–12)
NEUTROPHILS # BLD AUTO: 7.53 THOUSANDS/ΜL (ref 1.85–7.62)
NEUTS SEG NFR BLD AUTO: 71 % (ref 43–75)
NRBC BLD AUTO-RTO: 0 /100 WBCS
O2 CT VFR BLDCOA CALC: 5 ML/DL
OXYHGB MFR BLDCOA: 21 %
OXYHGB MFR BLDCOV: 42.6 %
PCO2 BLDCOA: 58.7 MM[HG] (ref 30–60)
PCO2 BLDCOV: 43.1 MM HG (ref 27–43)
PH BLDCOA: 7.3 [PH] (ref 7.23–7.43)
PH BLDCOV: 7.33 [PH] (ref 7.19–7.49)
PLATELET # BLD AUTO: 200 THOUSANDS/UL (ref 149–390)
PMV BLD AUTO: 10.5 FL (ref 8.9–12.7)
PO2 BLDCOA: 14.2 MM HG (ref 5–25)
PO2 BLDCOV: 20.7 MM HG (ref 15–45)
RBC # BLD AUTO: 4.57 MILLION/UL (ref 3.81–5.12)
RH BLD: POSITIVE
SAO2 % BLDCOV: 10 ML/DL
SPECIMEN EXPIRATION DATE: NORMAL
WBC # BLD AUTO: 10.54 THOUSAND/UL (ref 4.31–10.16)

## 2020-06-18 PROCEDURE — 4A1HXCZ MONITORING OF PRODUCTS OF CONCEPTION, CARDIAC RATE, EXTERNAL APPROACH: ICD-10-PCS | Performed by: OBSTETRICS & GYNECOLOGY

## 2020-06-18 PROCEDURE — NC001 PR NO CHARGE: Performed by: OBSTETRICS & GYNECOLOGY

## 2020-06-18 PROCEDURE — 86850 RBC ANTIBODY SCREEN: CPT | Performed by: OBSTETRICS & GYNECOLOGY

## 2020-06-18 PROCEDURE — 99214 OFFICE O/P EST MOD 30 MIN: CPT

## 2020-06-18 PROCEDURE — 10907ZC DRAINAGE OF AMNIOTIC FLUID, THERAPEUTIC FROM PRODUCTS OF CONCEPTION, VIA NATURAL OR ARTIFICIAL OPENING: ICD-10-PCS | Performed by: OBSTETRICS & GYNECOLOGY

## 2020-06-18 PROCEDURE — 86592 SYPHILIS TEST NON-TREP QUAL: CPT | Performed by: OBSTETRICS & GYNECOLOGY

## 2020-06-18 PROCEDURE — 99213 OFFICE O/P EST LOW 20 MIN: CPT | Performed by: OBSTETRICS & GYNECOLOGY

## 2020-06-18 PROCEDURE — 86901 BLOOD TYPING SEROLOGIC RH(D): CPT | Performed by: OBSTETRICS & GYNECOLOGY

## 2020-06-18 PROCEDURE — 85025 COMPLETE CBC W/AUTO DIFF WBC: CPT | Performed by: OBSTETRICS & GYNECOLOGY

## 2020-06-18 PROCEDURE — NC001 PR NO CHARGE: Performed by: STUDENT IN AN ORGANIZED HEALTH CARE EDUCATION/TRAINING PROGRAM

## 2020-06-18 PROCEDURE — 82805 BLOOD GASES W/O2 SATURATION: CPT | Performed by: OBSTETRICS & GYNECOLOGY

## 2020-06-18 PROCEDURE — 86900 BLOOD TYPING SEROLOGIC ABO: CPT | Performed by: OBSTETRICS & GYNECOLOGY

## 2020-06-18 RX ORDER — KETOROLAC TROMETHAMINE 30 MG/ML
30 INJECTION, SOLUTION INTRAMUSCULAR; INTRAVENOUS EVERY 6 HOURS
Status: DISPENSED | OUTPATIENT
Start: 2020-06-18 | End: 2020-06-19

## 2020-06-18 RX ORDER — DEXAMETHASONE SODIUM PHOSPHATE 4 MG/ML
8 INJECTION, SOLUTION INTRA-ARTICULAR; INTRALESIONAL; INTRAMUSCULAR; INTRAVENOUS; SOFT TISSUE ONCE AS NEEDED
Status: ACTIVE | OUTPATIENT
Start: 2020-06-18 | End: 2020-06-19

## 2020-06-18 RX ORDER — KETOROLAC TROMETHAMINE 30 MG/ML
INJECTION, SOLUTION INTRAMUSCULAR; INTRAVENOUS AS NEEDED
Status: DISCONTINUED | OUTPATIENT
Start: 2020-06-18 | End: 2020-06-18 | Stop reason: SURG

## 2020-06-18 RX ORDER — METOCLOPRAMIDE HYDROCHLORIDE 5 MG/ML
5 INJECTION INTRAMUSCULAR; INTRAVENOUS EVERY 6 HOURS PRN
Status: ACTIVE | OUTPATIENT
Start: 2020-06-18 | End: 2020-06-19

## 2020-06-18 RX ORDER — MORPHINE SULFATE 0.5 MG/ML
INJECTION, SOLUTION EPIDURAL; INTRATHECAL; INTRAVENOUS AS NEEDED
Status: DISCONTINUED | OUTPATIENT
Start: 2020-06-18 | End: 2020-06-18 | Stop reason: SURG

## 2020-06-18 RX ORDER — HYDROMORPHONE HCL/PF 1 MG/ML
0.2 SYRINGE (ML) INJECTION
Status: DISCONTINUED | OUTPATIENT
Start: 2020-06-18 | End: 2020-06-21 | Stop reason: HOSPADM

## 2020-06-18 RX ORDER — METOCLOPRAMIDE HYDROCHLORIDE 5 MG/ML
10 INJECTION INTRAMUSCULAR; INTRAVENOUS ONCE AS NEEDED
Status: DISCONTINUED | OUTPATIENT
Start: 2020-06-18 | End: 2020-06-21 | Stop reason: HOSPADM

## 2020-06-18 RX ORDER — SODIUM CHLORIDE, SODIUM LACTATE, POTASSIUM CHLORIDE, CALCIUM CHLORIDE 600; 310; 30; 20 MG/100ML; MG/100ML; MG/100ML; MG/100ML
125 INJECTION, SOLUTION INTRAVENOUS CONTINUOUS
Status: DISCONTINUED | OUTPATIENT
Start: 2020-06-18 | End: 2020-06-21 | Stop reason: HOSPADM

## 2020-06-18 RX ORDER — ONDANSETRON 2 MG/ML
4 INJECTION INTRAMUSCULAR; INTRAVENOUS EVERY 8 HOURS PRN
Status: DISCONTINUED | OUTPATIENT
Start: 2020-06-18 | End: 2020-06-18

## 2020-06-18 RX ORDER — SODIUM CHLORIDE, SODIUM LACTATE, POTASSIUM CHLORIDE, CALCIUM CHLORIDE 600; 310; 30; 20 MG/100ML; MG/100ML; MG/100ML; MG/100ML
75 INJECTION, SOLUTION INTRAVENOUS CONTINUOUS
Status: DISCONTINUED | OUTPATIENT
Start: 2020-06-18 | End: 2020-06-19

## 2020-06-18 RX ORDER — DIPHENHYDRAMINE HYDROCHLORIDE 50 MG/ML
12.5 INJECTION INTRAMUSCULAR; INTRAVENOUS ONCE AS NEEDED
Status: DISCONTINUED | OUTPATIENT
Start: 2020-06-18 | End: 2020-06-21 | Stop reason: HOSPADM

## 2020-06-18 RX ORDER — FENTANYL CITRATE/PF 50 MCG/ML
50 SYRINGE (ML) INJECTION
Status: DISCONTINUED | OUTPATIENT
Start: 2020-06-18 | End: 2020-06-21 | Stop reason: HOSPADM

## 2020-06-18 RX ORDER — ONDANSETRON 2 MG/ML
4 INJECTION INTRAMUSCULAR; INTRAVENOUS EVERY 4 HOURS PRN
Status: ACTIVE | OUTPATIENT
Start: 2020-06-18 | End: 2020-06-19

## 2020-06-18 RX ORDER — BUPIVACAINE HYDROCHLORIDE AND EPINEPHRINE 2.5; 5 MG/ML; UG/ML
INJECTION, SOLUTION EPIDURAL; INFILTRATION; INTRACAUDAL; PERINEURAL AS NEEDED
Status: DISCONTINUED | OUTPATIENT
Start: 2020-06-18 | End: 2020-06-18 | Stop reason: SURG

## 2020-06-18 RX ORDER — DIPHENHYDRAMINE HYDROCHLORIDE 50 MG/ML
INJECTION INTRAMUSCULAR; INTRAVENOUS AS NEEDED
Status: DISCONTINUED | OUTPATIENT
Start: 2020-06-18 | End: 2020-06-18 | Stop reason: SURG

## 2020-06-18 RX ORDER — OXYTOCIN/RINGER'S LACTATE 30/500 ML
PLASTIC BAG, INJECTION (ML) INTRAVENOUS CONTINUOUS PRN
Status: DISCONTINUED | OUTPATIENT
Start: 2020-06-18 | End: 2020-06-18 | Stop reason: SURG

## 2020-06-18 RX ORDER — METOCLOPRAMIDE HYDROCHLORIDE 5 MG/ML
INJECTION INTRAMUSCULAR; INTRAVENOUS AS NEEDED
Status: DISCONTINUED | OUTPATIENT
Start: 2020-06-18 | End: 2020-06-18 | Stop reason: SURG

## 2020-06-18 RX ORDER — FENTANYL CITRATE 50 UG/ML
INJECTION, SOLUTION INTRAMUSCULAR; INTRAVENOUS AS NEEDED
Status: DISCONTINUED | OUTPATIENT
Start: 2020-06-18 | End: 2020-06-18 | Stop reason: SURG

## 2020-06-18 RX ORDER — NALOXONE HYDROCHLORIDE 0.4 MG/ML
0.1 INJECTION, SOLUTION INTRAMUSCULAR; INTRAVENOUS; SUBCUTANEOUS
Status: ACTIVE | OUTPATIENT
Start: 2020-06-18 | End: 2020-06-19

## 2020-06-18 RX ORDER — HYDROMORPHONE HCL/PF 1 MG/ML
0.5 SYRINGE (ML) INJECTION
Status: DISCONTINUED | OUTPATIENT
Start: 2020-06-18 | End: 2020-06-21 | Stop reason: HOSPADM

## 2020-06-18 RX ORDER — ONDANSETRON 2 MG/ML
4 INJECTION INTRAMUSCULAR; INTRAVENOUS ONCE AS NEEDED
Status: DISCONTINUED | OUTPATIENT
Start: 2020-06-18 | End: 2020-06-21 | Stop reason: HOSPADM

## 2020-06-18 RX ORDER — CEFAZOLIN SODIUM 2 G/50ML
2000 SOLUTION INTRAVENOUS ONCE
Status: COMPLETED | OUTPATIENT
Start: 2020-06-18 | End: 2020-06-18

## 2020-06-18 RX ADMIN — CEFAZOLIN SODIUM 2000 MG: 2 SOLUTION INTRAVENOUS at 20:52

## 2020-06-18 RX ADMIN — DIPHENHYDRAMINE HYDROCHLORIDE 25 MG: 50 INJECTION, SOLUTION INTRAMUSCULAR; INTRAVENOUS at 21:32

## 2020-06-18 RX ADMIN — BUPIVACAINE HYDROCHLORIDE AND EPINEPHRINE 1.6 ML: 2.5; 5 INJECTION, SOLUTION EPIDURAL; INFILTRATION; INTRACAUDAL; PERINEURAL at 20:51

## 2020-06-18 RX ADMIN — SODIUM CHLORIDE, SODIUM LACTATE, POTASSIUM CHLORIDE, AND CALCIUM CHLORIDE: .6; .31; .03; .02 INJECTION, SOLUTION INTRAVENOUS at 20:18

## 2020-06-18 RX ADMIN — MORPHINE SULFATE 0.1 MG: 0.5 INJECTION, SOLUTION EPIDURAL; INTRATHECAL; INTRAVENOUS at 20:51

## 2020-06-18 RX ADMIN — Medication 500 MG: at 21:04

## 2020-06-18 RX ADMIN — METOCLOPRAMIDE HYDROCHLORIDE 10 MG: 5 INJECTION INTRAMUSCULAR; INTRAVENOUS at 21:16

## 2020-06-18 RX ADMIN — Medication 250 MILLI-UNITS/MIN: at 21:17

## 2020-06-18 RX ADMIN — PHENYLEPHRINE HYDROCHLORIDE 50 MCG/MIN: 10 INJECTION INTRAVENOUS at 20:51

## 2020-06-18 RX ADMIN — KETOROLAC TROMETHAMINE 30 MG: 30 INJECTION, SOLUTION INTRAMUSCULAR at 21:32

## 2020-06-18 RX ADMIN — FENTANYL CITRATE 15 MCG: 50 INJECTION INTRAMUSCULAR; INTRAVENOUS at 20:51

## 2020-06-19 LAB
ERYTHROCYTE [DISTWIDTH] IN BLOOD BY AUTOMATED COUNT: 13.9 % (ref 11.6–15.1)
HCT VFR BLD AUTO: 30.1 % (ref 34.8–46.1)
HGB BLD-MCNC: 9.8 G/DL (ref 11.5–15.4)
MCH RBC QN AUTO: 29.5 PG (ref 26.8–34.3)
MCHC RBC AUTO-ENTMCNC: 32.6 G/DL (ref 31.4–37.4)
MCV RBC AUTO: 91 FL (ref 82–98)
PLATELET # BLD AUTO: 145 THOUSANDS/UL (ref 149–390)
PMV BLD AUTO: 9.8 FL (ref 8.9–12.7)
RBC # BLD AUTO: 3.32 MILLION/UL (ref 3.81–5.12)
RPR SER QL: NORMAL
WBC # BLD AUTO: 10.8 THOUSAND/UL (ref 4.31–10.16)

## 2020-06-19 PROCEDURE — NC001 PR NO CHARGE: Performed by: OBSTETRICS & GYNECOLOGY

## 2020-06-19 PROCEDURE — 85027 COMPLETE CBC AUTOMATED: CPT | Performed by: STUDENT IN AN ORGANIZED HEALTH CARE EDUCATION/TRAINING PROGRAM

## 2020-06-19 PROCEDURE — 94762 N-INVAS EAR/PLS OXIMTRY CONT: CPT

## 2020-06-19 RX ORDER — OXYTOCIN/RINGER'S LACTATE 30/500 ML
62.5 PLASTIC BAG, INJECTION (ML) INTRAVENOUS CONTINUOUS
Status: ACTIVE | OUTPATIENT
Start: 2020-06-19 | End: 2020-06-19

## 2020-06-19 RX ORDER — DOCUSATE SODIUM 100 MG/1
100 CAPSULE, LIQUID FILLED ORAL 2 TIMES DAILY
Status: DISCONTINUED | OUTPATIENT
Start: 2020-06-19 | End: 2020-06-21 | Stop reason: HOSPADM

## 2020-06-19 RX ORDER — CALCIUM CARBONATE 200(500)MG
1000 TABLET,CHEWABLE ORAL 3 TIMES DAILY PRN
Status: DISCONTINUED | OUTPATIENT
Start: 2020-06-19 | End: 2020-06-21 | Stop reason: HOSPADM

## 2020-06-19 RX ORDER — DIPHENHYDRAMINE HYDROCHLORIDE 50 MG/ML
25 INJECTION INTRAMUSCULAR; INTRAVENOUS EVERY 6 HOURS PRN
Status: DISCONTINUED | OUTPATIENT
Start: 2020-06-19 | End: 2020-06-21 | Stop reason: HOSPADM

## 2020-06-19 RX ORDER — OXYCODONE HYDROCHLORIDE AND ACETAMINOPHEN 5; 325 MG/1; MG/1
1 TABLET ORAL EVERY 4 HOURS PRN
Status: DISCONTINUED | OUTPATIENT
Start: 2020-06-19 | End: 2020-06-21 | Stop reason: HOSPADM

## 2020-06-19 RX ORDER — OXYCODONE HYDROCHLORIDE AND ACETAMINOPHEN 5; 325 MG/1; MG/1
2 TABLET ORAL EVERY 4 HOURS PRN
Status: DISCONTINUED | OUTPATIENT
Start: 2020-06-19 | End: 2020-06-21 | Stop reason: HOSPADM

## 2020-06-19 RX ORDER — ACETAMINOPHEN 325 MG/1
650 TABLET ORAL EVERY 4 HOURS PRN
Status: DISCONTINUED | OUTPATIENT
Start: 2020-06-19 | End: 2020-06-21 | Stop reason: HOSPADM

## 2020-06-19 RX ORDER — SIMETHICONE 80 MG
80 TABLET,CHEWABLE ORAL 4 TIMES DAILY PRN
Status: DISCONTINUED | OUTPATIENT
Start: 2020-06-19 | End: 2020-06-21 | Stop reason: HOSPADM

## 2020-06-19 RX ORDER — OXYTOCIN/RINGER'S LACTATE 30/500 ML
62.5 PLASTIC BAG, INJECTION (ML) INTRAVENOUS CONTINUOUS
Status: DISCONTINUED | OUTPATIENT
Start: 2020-06-19 | End: 2020-06-19 | Stop reason: SDUPTHER

## 2020-06-19 RX ORDER — SENNOSIDES 8.6 MG
1 TABLET ORAL DAILY
Status: DISCONTINUED | OUTPATIENT
Start: 2020-06-19 | End: 2020-06-21 | Stop reason: HOSPADM

## 2020-06-19 RX ORDER — IBUPROFEN 600 MG/1
600 TABLET ORAL EVERY 6 HOURS PRN
Status: DISCONTINUED | OUTPATIENT
Start: 2020-06-19 | End: 2020-06-21 | Stop reason: HOSPADM

## 2020-06-19 RX ORDER — MAGNESIUM HYDROXIDE/ALUMINUM HYDROXICE/SIMETHICONE 120; 1200; 1200 MG/30ML; MG/30ML; MG/30ML
15 SUSPENSION ORAL EVERY 6 HOURS PRN
Status: DISCONTINUED | OUTPATIENT
Start: 2020-06-19 | End: 2020-06-21 | Stop reason: HOSPADM

## 2020-06-19 RX ADMIN — SODIUM CHLORIDE, SODIUM LACTATE, POTASSIUM CHLORIDE, AND CALCIUM CHLORIDE 125 ML/HR: .6; .31; .03; .02 INJECTION, SOLUTION INTRAVENOUS at 08:19

## 2020-06-19 RX ADMIN — IBUPROFEN 600 MG: 600 TABLET ORAL at 21:22

## 2020-06-19 RX ADMIN — SODIUM CHLORIDE, SODIUM LACTATE, POTASSIUM CHLORIDE, AND CALCIUM CHLORIDE 125 ML/HR: .6; .31; .03; .02 INJECTION, SOLUTION INTRAVENOUS at 04:07

## 2020-06-19 RX ADMIN — KETOROLAC TROMETHAMINE 30 MG: 30 INJECTION, SOLUTION INTRAMUSCULAR at 09:06

## 2020-06-19 RX ADMIN — Medication 62.5 MILLI-UNITS/MIN: at 00:20

## 2020-06-19 RX ADMIN — KETOROLAC TROMETHAMINE 30 MG: 30 INJECTION, SOLUTION INTRAMUSCULAR at 03:15

## 2020-06-19 RX ADMIN — KETOROLAC TROMETHAMINE 30 MG: 30 INJECTION, SOLUTION INTRAMUSCULAR at 15:20

## 2020-06-20 PROCEDURE — NC001 PR NO CHARGE: Performed by: OBSTETRICS & GYNECOLOGY

## 2020-06-20 PROCEDURE — 94760 N-INVAS EAR/PLS OXIMETRY 1: CPT

## 2020-06-20 RX ADMIN — OXYCODONE HYDROCHLORIDE AND ACETAMINOPHEN 2 TABLET: 5; 325 TABLET ORAL at 10:16

## 2020-06-20 RX ADMIN — DOCUSATE SODIUM 100 MG: 100 CAPSULE, LIQUID FILLED ORAL at 19:43

## 2020-06-20 RX ADMIN — DOCUSATE SODIUM 100 MG: 100 CAPSULE, LIQUID FILLED ORAL at 09:00

## 2020-06-20 RX ADMIN — OXYCODONE HYDROCHLORIDE AND ACETAMINOPHEN 2 TABLET: 5; 325 TABLET ORAL at 04:37

## 2020-06-20 RX ADMIN — IBUPROFEN 600 MG: 600 TABLET ORAL at 15:55

## 2020-06-20 RX ADMIN — IBUPROFEN 600 MG: 600 TABLET ORAL at 22:51

## 2020-06-21 VITALS
WEIGHT: 184 LBS | BODY MASS INDEX: 31.41 KG/M2 | TEMPERATURE: 98.2 F | RESPIRATION RATE: 18 BRPM | OXYGEN SATURATION: 95 % | HEART RATE: 102 BPM | DIASTOLIC BLOOD PRESSURE: 81 MMHG | HEIGHT: 64 IN | SYSTOLIC BLOOD PRESSURE: 129 MMHG

## 2020-06-21 PROCEDURE — NC001 PR NO CHARGE: Performed by: OBSTETRICS & GYNECOLOGY

## 2020-06-21 RX ORDER — OXYCODONE HYDROCHLORIDE 5 MG/1
5 TABLET ORAL EVERY 4 HOURS PRN
Qty: 15 TABLET | Refills: 0 | Status: CANCELLED | OUTPATIENT
Start: 2020-06-21 | End: 2020-07-01

## 2020-06-21 RX ORDER — ACETAMINOPHEN 325 MG/1
650 TABLET ORAL EVERY 4 HOURS PRN
Qty: 30 TABLET | Refills: 0
Start: 2020-06-21 | End: 2021-06-04 | Stop reason: SDUPTHER

## 2020-06-21 RX ORDER — DOCUSATE SODIUM 100 MG/1
100 CAPSULE, LIQUID FILLED ORAL 2 TIMES DAILY
Qty: 10 CAPSULE | Refills: 0
Start: 2020-06-21 | End: 2020-06-29

## 2020-06-21 RX ORDER — IBUPROFEN 600 MG/1
600 TABLET ORAL EVERY 6 HOURS PRN
Qty: 30 TABLET | Refills: 0
Start: 2020-06-21 | End: 2020-09-03 | Stop reason: ALTCHOICE

## 2020-06-21 RX ADMIN — DOCUSATE SODIUM 100 MG: 100 CAPSULE, LIQUID FILLED ORAL at 09:58

## 2020-06-21 RX ADMIN — IBUPROFEN 600 MG: 600 TABLET ORAL at 05:44

## 2020-06-21 RX ADMIN — SENNOSIDES 8.6 MG: 8.6 TABLET, FILM COATED ORAL at 09:58

## 2020-06-21 RX ADMIN — IBUPROFEN 600 MG: 600 TABLET ORAL at 12:22

## 2020-06-25 LAB — PLACENTA IN STORAGE: NORMAL

## 2020-06-26 ENCOUNTER — TELEPHONE (OUTPATIENT)
Dept: OBGYN CLINIC | Facility: CLINIC | Age: 30
End: 2020-06-26

## 2020-06-29 ENCOUNTER — OFFICE VISIT (OUTPATIENT)
Dept: OBGYN CLINIC | Facility: CLINIC | Age: 30
End: 2020-06-29

## 2020-06-29 VITALS
DIASTOLIC BLOOD PRESSURE: 63 MMHG | HEIGHT: 64 IN | HEART RATE: 94 BPM | WEIGHT: 169 LBS | BODY MASS INDEX: 28.85 KG/M2 | TEMPERATURE: 98.5 F | SYSTOLIC BLOOD PRESSURE: 125 MMHG

## 2020-06-29 PROCEDURE — 1111F DSCHRG MED/CURRENT MED MERGE: CPT | Performed by: NURSE PRACTITIONER

## 2020-06-29 PROCEDURE — 99213 OFFICE O/P EST LOW 20 MIN: CPT | Performed by: NURSE PRACTITIONER

## 2020-06-29 PROCEDURE — 1036F TOBACCO NON-USER: CPT | Performed by: NURSE PRACTITIONER

## 2020-07-13 ENCOUNTER — OFFICE VISIT (OUTPATIENT)
Dept: OBGYN CLINIC | Facility: CLINIC | Age: 30
End: 2020-07-13

## 2020-07-13 VITALS
WEIGHT: 165 LBS | BODY MASS INDEX: 28.17 KG/M2 | TEMPERATURE: 98.4 F | HEART RATE: 102 BPM | HEIGHT: 64 IN | SYSTOLIC BLOOD PRESSURE: 117 MMHG | DIASTOLIC BLOOD PRESSURE: 81 MMHG

## 2020-07-13 PROCEDURE — 99213 OFFICE O/P EST LOW 20 MIN: CPT | Performed by: OBSTETRICS & GYNECOLOGY

## 2020-07-13 PROCEDURE — 1111F DSCHRG MED/CURRENT MED MERGE: CPT | Performed by: OBSTETRICS & GYNECOLOGY

## 2020-07-13 NOTE — PROGRESS NOTES
Deandre Jiang is a 30 y o  [place]ong[place]ian-speaking female who is  and presents for an incision check and 3-week postpartum visit  She is 3 weeks postpartum following a repeat low cervical transverse  section on 20  I have fully reviewed the prenatal and intrapartum course, which was c/b prior C/S and GBS positivity  The delivery was at 38w4d  Outcome: repeat  section, low transverse incision  Anesthesia: spinal   Postpartum course has been uncomplicated  Baby's course has been uncomplicated  Baby is feeding by breast  Bleeding thin lochia  Bowel function is normal  Bladder function is normal  Patient is not sexually active  Contraception method is condoms  Postpartum depression screening: negative  The following portions of the patient's history were reviewed and updated as appropriate: allergies, current medications, past family history, past medical history, past social history, past surgical history and problem list     Review of Systems  Constitutional: negative for chills and fevers  Respiratory: negative for pleurisy/chest pain  Cardiovascular: negative for chest pain  Gastrointestinal: negative for abdominal pain, change in bowel habits and melena  Genitourinary:negative for sexual problems and dysuria  Integument/breast: negative for breast tenderness, pruritus and difficuty breastfeeding  Objective     /64   Wt 59 4 kg (131 lb)   BMI 22 49 kg/m²    General:   appears stated age, cooperative, alert normal mood and affect       Heart: regular rate and rhythm, S1, S2 normal, no murmur, click, rub or gallop   Lungs: clear to auscultation bilaterally       Abdomen:  Incision soft, non-tender, without masses or organomegaly  C/D/I, no erythema, no TTP      Assessment/Plan     Uncomplicated postpartum exam  Pap smear not done at today's visit, as she is 3w PP  1  Contraception: condoms  2  Follow up in: 3 months for annual and Pap Smear, or as needed

## 2020-08-10 ENCOUNTER — TELEPHONE (OUTPATIENT)
Dept: OBGYN CLINIC | Facility: CLINIC | Age: 30
End: 2020-08-10

## 2020-09-03 ENCOUNTER — OFFICE VISIT (OUTPATIENT)
Dept: INTERNAL MEDICINE CLINIC | Facility: CLINIC | Age: 30
End: 2020-09-03

## 2020-09-03 VITALS
HEIGHT: 64 IN | BODY MASS INDEX: 27.25 KG/M2 | DIASTOLIC BLOOD PRESSURE: 60 MMHG | SYSTOLIC BLOOD PRESSURE: 110 MMHG | WEIGHT: 159.61 LBS | TEMPERATURE: 97.9 F

## 2020-09-03 DIAGNOSIS — K08.89 PAIN, DENTAL: Primary | ICD-10-CM

## 2020-09-03 PROCEDURE — 99203 OFFICE O/P NEW LOW 30 MIN: CPT | Performed by: INTERNAL MEDICINE

## 2020-09-03 NOTE — PROGRESS NOTES
[facility]  [address] [address]    NAME: Marky Escobar  AGE: 30 y o  SEX: female    DATE OF ENCOUNTER: 9/3/2020    Assessment and Plan     1  Pain, dental  - Ambulatory referral to Dentistry; Future    Healthcare maintenance  - Discussed avoidance of tobacco, limiting alcohol consumption, physical activity, maintaining healthy weight and diet and avoidance of excess sun exposure  - Breast cancer screening:   Patient is average risk  Screening mammogram at age 34    - Cervical cancer screening: needs pap smear  Will follow up with obgyn    - Colorectal cancer screening:  Patient is average risk  Colonoscopy at age 46  - Screening for HIV: negative 2020  - Depression screening: negative for depression 9/3/2020  - Immunization:      Tdap: up to date  No orders of the defined types were placed in this encounter       - Counseling Documentation: patient was counseled regarding: instructions for management      Chief Complaint     Chief Complaint   Patient presents with    Follow-up     patient here today for a basic follow up       History of Present Illness     30years old patient, [place] speaking, presented to the clinic with her  to establish care  Patient with no significant past medical history  She is 3 weeks postpartum with elective  section  Reported doing well with no acute complaints  Active issues today is dental pain for which she is requesting referral for OMFS  Patient does not smoke, does not drink alcohol and denies recreational drug use  The following portions of the patient's history were reviewed and updated as appropriate: allergies, current medications, past family history, past medical history, past social history, past surgical history and problem list     Review of Systems     Review of Systems   Constitutional: Negative for chills, fatigue and fever     HENT: Positive for dental problem  Respiratory: Negative for cough, shortness of breath and wheezing  Cardiovascular: Negative for chest pain, palpitations and leg swelling  Gastrointestinal: Negative for abdominal pain, nausea and vomiting  Musculoskeletal: Negative for back pain, myalgias and neck pain  Neurological: Negative for dizziness, weakness and headaches  Psychiatric/Behavioral: The patient is not nervous/anxious  Active Problem List     Patient Active Problem List   Diagnosis    Maternal care due to low transverse uterine scar from previous  delivery    38 weeks gestation of pregnancy    Transverse presentation, antepartum, other fetus    Status post repeat low transverse  section       Objective     /60   Temp 97 9 °F (36 6 °C)   Ht 5' 4" (1 626 m)   Wt 72 4 kg (159 lb 9 8 oz)   BMI 27 40 kg/m²     Physical Exam  Constitutional:       General: She is not in acute distress  Appearance: Normal appearance  She is not ill-appearing  HENT:      Head: Normocephalic and atraumatic  Eyes:      Conjunctiva/sclera: Conjunctivae normal    Cardiovascular:      Rate and Rhythm: Normal rate and regular rhythm  Heart sounds: Normal heart sounds  No murmur  No gallop  Pulmonary:      Effort: Pulmonary effort is normal  No respiratory distress  Breath sounds: Normal breath sounds  No wheezing or rales  Abdominal:      Palpations: Abdomen is soft  Tenderness: There is no abdominal tenderness  Musculoskeletal:         General: No swelling or tenderness  Right lower leg: No edema  Left lower leg: No edema  Skin:     General: Skin is warm  Neurological:      General: No focal deficit present  Mental Status: She is alert and oriented to person, place, and time     Psychiatric:         Mood and Affect: Mood normal          Behavior: Behavior normal          Pertinent Laboratory/Diagnostic Studies:  CBC:   Lab Results   Component Value Date/Time    WBC 10 80 (H) 06/19/2020 12:31 PM    WBC 12 2 (H) 05/09/2017 12:17 PM    RBC 3 32 (L) 06/19/2020 12:31 PM    RBC 4 15 05/09/2017 12:17 PM    HGB 9 8 (L) 06/19/2020 12:31 PM    HGB 12 1 05/09/2017 12:17 PM    HCT 30 1 (L) 06/19/2020 12:31 PM    HCT 37 0 05/09/2017 12:17 PM    MCV 91 06/19/2020 12:31 PM    MCV 89 0 05/09/2017 12:17 PM    MCH 29 5 06/19/2020 12:31 PM    MCH 29 2 05/09/2017 12:17 PM    MCHC 32 6 06/19/2020 12:31 PM    MCHC 32 8 05/09/2017 12:17 PM    RDW 13 9 06/19/2020 12:31 PM    RDW 13 2 05/09/2017 12:17 PM    MPV 9 8 06/19/2020 12:31 PM    MPV 7 5 05/09/2017 12:17 PM     (L) 06/19/2020 12:31 PM     05/09/2017 12:17 PM    NRBC 0 06/18/2020 07:36 PM    NEUTOPHILPCT 71 06/18/2020 07:36 PM    LYMPHOPCT 18 06/18/2020 07:36 PM    MONOPCT 7 06/18/2020 07:36 PM    EOSPCT 2 06/18/2020 07:36 PM    BASOPCT 1 06/18/2020 07:36 PM    BASOPCT 0 6 02/07/2017 01:56 PM    NEUTROABS 7 53 06/18/2020 07:36 PM    NEUTROABS 9727 (H) 02/07/2017 01:56 PM    NEUTROABS 77 2 02/07/2017 01:56 PM    LYMPHSABS 1 92 06/18/2020 07:36 PM    LYMPHSABS 1739 02/07/2017 01:56 PM    LYMPHSABS 13 8 02/07/2017 01:56 PM    MONOSABS 0 68 06/18/2020 07:36 PM    MONOSABS 781 02/07/2017 01:56 PM    EOSABS 0 22 06/18/2020 07:36 PM    EOSABS 277 02/07/2017 01:56 PM    EOSABS 2 2 02/07/2017 01:56 PM     Chemistry Profile:   Lab Results   Component Value Date/Time    K 4 3 05/14/2020 01:46 PM     05/14/2020 01:46 PM    CO2 22 05/14/2020 01:46 PM    BUN 7 05/14/2020 01:46 PM    CREATININE 0 58 (L) 05/14/2020 01:46 PM    GLUF 97 05/14/2020 01:46 PM    CALCIUM 8 9 05/14/2020 01:46 PM    AST 10 05/14/2020 01:46 PM    ALT 12 05/14/2020 01:46 PM    ALKPHOS 130 (H) 05/14/2020 01:46 PM    EGFR 123 05/14/2020 01:46 PM     CBC:   Results from Last 12 Months   Lab Units 06/19/20  1231 06/18/20  1936   WBC Thousand/uL 10 80* 10 54*   RBC Million/uL 3 32* 4 57   HEMOGLOBIN g/dL 9 8* 13 3   HEMATOCRIT % 30 1* 40 2   MCV fL 91 88 MCH pg 29 5 29 1   MCHC g/dL 32 6 33 1   RDW % 13 9 14 0   MPV fL 9 8 10 5   PLATELETS Thousands/uL 145* 200   NRBC AUTO /100 WBCs  --  0   NEUTROS PCT %  --  71   LYMPHS PCT %  --  18   MONOS PCT %  --  7   EOS PCT %  --  2   BASOS PCT %  --  1   NEUTROS ABS Thousands/µL  --  7 53   LYMPHS ABS Thousands/µL  --  1 92   MONOS ABS Thousand/µL  --  0 68   EOS ABS Thousand/µL  --  0 22     Chemistry Profile:   Results from Last 12 Months   Lab Units 05/14/20  1346   POTASSIUM mmol/L 4 3   CHLORIDE mmol/L 106   CO2 mmol/L 22   BUN mg/dL 7   CREATININE mg/dL 0 58*   GLUCOSE FASTING mg/dL 97   CALCIUM mg/dL 8 9   AST U/L 10   ALT U/L 12   ALK PHOS U/L 130*   EGFR ml/min/1 73sq m 123       Current Medications     Current Outpatient Medications:     acetaminophen (TYLENOL) 325 mg tablet, Take 2 tablets (650 mg total) by mouth every 4 (four) hours as needed for mild pain, Disp: 30 tablet, Rfl: 0    Calcium Carbonate-Vitamin D (CALCIUM 500/D PO), Take by mouth, Disp: , Rfl:     Multiple Vitamin (MULTIVITAMIN) capsule, Take 1 capsule by mouth daily, Disp: , Rfl:     Omega-3 Fatty Acids (FISH OIL BURP-LESS PO), Take by mouth, Disp: , Rfl:     ibuprofen (MOTRIN) 600 mg tablet, Take 1 tablet (600 mg total) by mouth every 6 (six) hours as needed for headaches (Patient not taking: Reported on 7/13/2020), Disp: 30 tablet, Rfl: 0    Health Maintenance     Health Maintenance   Topic Date Due    BMI: Followup Plan  12/12/2006    Annual Physical  12/12/2006    Cervical Cancer Screening  12/12/2009    Depression Screening PHQ  08/26/2020    Influenza Vaccine  09/12/2020 (Originally 7/1/2020)    BMI: Adult  07/13/2021    DTaP,Tdap,and Td Vaccines (4 - Td) 04/15/2030    HIV Screening  Completed    Pneumococcal Vaccine: Pediatrics (0 to 5 Years) and At-Risk Patients (6 to 59 Years)  Aged Out    HIB Vaccine  Aged Out    Hepatitis B Vaccine  Aged Out    IPV Vaccine  Aged Out    Hepatitis A Vaccine  Aged Levon Nandini Meningococcal ACWY Vaccine  Aged Out    HPV Vaccine  Aged Out     Immunization History   Administered Date(s) Administered    DTaP 01/12/2017    Tdap 06/12/2017, 04/15/2020       Jesus Alberto Cabral MD  PGY II  9/3/2020 2:02 PM  Portions of the record may have been created with voice recognition software  Occasional wrong word or "sound a like" substitutions may have occurred due to the inherent limitations of voice recognition software  Read the chart carefully and recognize, using context, where substitutions have occurred

## 2020-11-18 ENCOUNTER — OFFICE VISIT (OUTPATIENT)
Dept: INTERNAL MEDICINE CLINIC | Facility: CLINIC | Age: 30
End: 2020-11-18

## 2020-11-18 VITALS
DIASTOLIC BLOOD PRESSURE: 60 MMHG | SYSTOLIC BLOOD PRESSURE: 110 MMHG | TEMPERATURE: 97.1 F | WEIGHT: 163.4 LBS | BODY MASS INDEX: 28.05 KG/M2 | HEART RATE: 91 BPM | OXYGEN SATURATION: 98 %

## 2020-11-18 DIAGNOSIS — D64.9 ANEMIA, UNSPECIFIED TYPE: ICD-10-CM

## 2020-11-18 DIAGNOSIS — M54.9 UPPER BACK PAIN ON RIGHT SIDE: ICD-10-CM

## 2020-11-18 DIAGNOSIS — L29.9 PRURITUS: Primary | ICD-10-CM

## 2020-11-18 PROCEDURE — 99214 OFFICE O/P EST MOD 30 MIN: CPT | Performed by: INTERNAL MEDICINE

## 2020-11-18 PROCEDURE — 1036F TOBACCO NON-USER: CPT | Performed by: INTERNAL MEDICINE

## 2020-12-14 ENCOUNTER — LAB (OUTPATIENT)
Dept: LAB | Facility: HOSPITAL | Age: 30
End: 2020-12-14
Payer: COMMERCIAL

## 2020-12-14 DIAGNOSIS — L29.9 PRURITUS: ICD-10-CM

## 2020-12-14 LAB
ALBUMIN SERPL BCP-MCNC: 4 G/DL (ref 3.5–5)
ALP SERPL-CCNC: 95 U/L (ref 46–116)
ALT SERPL W P-5'-P-CCNC: 18 U/L (ref 12–78)
ANION GAP SERPL CALCULATED.3IONS-SCNC: 6 MMOL/L (ref 4–13)
AST SERPL W P-5'-P-CCNC: 10 U/L (ref 5–45)
BASOPHILS # BLD AUTO: 0.05 THOUSANDS/ΜL (ref 0–0.1)
BASOPHILS NFR BLD AUTO: 1 % (ref 0–1)
BILIRUB SERPL-MCNC: 0.5 MG/DL (ref 0.2–1)
BUN SERPL-MCNC: 15 MG/DL (ref 5–25)
CALCIUM SERPL-MCNC: 9.2 MG/DL (ref 8.3–10.1)
CHLORIDE SERPL-SCNC: 110 MMOL/L (ref 100–108)
CO2 SERPL-SCNC: 26 MMOL/L (ref 21–32)
CREAT SERPL-MCNC: 0.64 MG/DL (ref 0.6–1.3)
EOSINOPHIL # BLD AUTO: 0.37 THOUSAND/ΜL (ref 0–0.61)
EOSINOPHIL NFR BLD AUTO: 5 % (ref 0–6)
ERYTHROCYTE [DISTWIDTH] IN BLOOD BY AUTOMATED COUNT: 11.8 % (ref 11.6–15.1)
GFR SERPL CREATININE-BSD FRML MDRD: 118 ML/MIN/1.73SQ M
GLUCOSE P FAST SERPL-MCNC: 83 MG/DL (ref 65–99)
HCT VFR BLD AUTO: 40.2 % (ref 34.8–46.1)
HGB BLD-MCNC: 13.3 G/DL (ref 11.5–15.4)
IMM GRANULOCYTES # BLD AUTO: 0.03 THOUSAND/UL (ref 0–0.2)
IMM GRANULOCYTES NFR BLD AUTO: 0 % (ref 0–2)
LYMPHOCYTES # BLD AUTO: 2.4 THOUSANDS/ΜL (ref 0.6–4.47)
LYMPHOCYTES NFR BLD AUTO: 34 % (ref 14–44)
MCH RBC QN AUTO: 29.4 PG (ref 26.8–34.3)
MCHC RBC AUTO-ENTMCNC: 33.1 G/DL (ref 31.4–37.4)
MCV RBC AUTO: 89 FL (ref 82–98)
MONOCYTES # BLD AUTO: 0.5 THOUSAND/ΜL (ref 0.17–1.22)
MONOCYTES NFR BLD AUTO: 7 % (ref 4–12)
NEUTROPHILS # BLD AUTO: 3.79 THOUSANDS/ΜL (ref 1.85–7.62)
NEUTS SEG NFR BLD AUTO: 53 % (ref 43–75)
NRBC BLD AUTO-RTO: 0 /100 WBCS
PLATELET # BLD AUTO: 232 THOUSANDS/UL (ref 149–390)
PMV BLD AUTO: 9.7 FL (ref 8.9–12.7)
POTASSIUM SERPL-SCNC: 4.3 MMOL/L (ref 3.5–5.3)
PROT SERPL-MCNC: 8.1 G/DL (ref 6.4–8.2)
RBC # BLD AUTO: 4.52 MILLION/UL (ref 3.81–5.12)
SODIUM SERPL-SCNC: 142 MMOL/L (ref 136–145)
TSH SERPL DL<=0.05 MIU/L-ACNC: 0.45 UIU/ML (ref 0.36–3.74)
WBC # BLD AUTO: 7.14 THOUSAND/UL (ref 4.31–10.16)

## 2020-12-14 PROCEDURE — 84443 ASSAY THYROID STIM HORMONE: CPT

## 2020-12-14 PROCEDURE — 36415 COLL VENOUS BLD VENIPUNCTURE: CPT

## 2020-12-14 PROCEDURE — 85025 COMPLETE CBC W/AUTO DIFF WBC: CPT

## 2020-12-14 PROCEDURE — 80053 COMPREHEN METABOLIC PANEL: CPT

## 2021-02-12 DIAGNOSIS — K02.9 CARIES: ICD-10-CM

## 2021-02-24 ENCOUNTER — CONSULT (OUTPATIENT)
Dept: MULTI SPECIALTY CLINIC | Facility: CLINIC | Age: 31
End: 2021-02-24

## 2021-02-24 VITALS — WEIGHT: 162 LBS | TEMPERATURE: 98.3 F | HEIGHT: 65 IN | BODY MASS INDEX: 26.99 KG/M2

## 2021-02-24 DIAGNOSIS — L71.9 ROSACEA: ICD-10-CM

## 2021-02-24 NOTE — PROGRESS NOTES
[facility] Note     Patient Name: Rigo Ambrose  Encounter Date: 2/24/21     Have you been cared for by a [facility] in the last 3 years and, if so, which one? No    · Have you traveled outside of the [address] [place] [address] in the past 3 months or outside of the [facility] area in the last 2 weeks? No     May we call your Preferred Phone number to discuss your specific medical information? Yes     May we leave a detailed message that includes your specific medical information? Yes      Today's Chief Concerns:   Concern #1:  Rash    Concern #2:  Face redness    Past Medical History:  Have you personally ever had or currently have any of the following? · Skin cancer (such as Melanoma, Basal Cell Carcinoma, Squamous Cell Carcinoma? (If Yes, please provide more detail)- No  · Eczema: No  · Psoriasis: No  · HIV/AIDS: No  · Hepatitis B or C: No  · Tuberculosis: No  · Systemic Immunosuppression such as Diabetes, Biologic or Immunotherapy, Chemotherapy, Organ Transplantation, Bone Marrow Transplantation (If YES, please provide more detail): No  · Radiation Treatment (If YES, please provide more detail): No  · Any other major medical conditions/concerns? (If Yes, which types)- No    Social History:     What is/was your primary occupation?  What are your hobbies/past-times? Family History:  Have any of your "first degree relatives" (parent, brother, sister, or child) had any of the following       · Skin cancer such as Melanoma or Merkel Cell Carcinoma or Pancreatic Cancer? No  · Eczema, Asthma, Hay Fever or Seasonal Allergies: No  · Psoriasis or Psoriatic Arthritis: No  · Do any other medical conditions seem to run in your family? If Yes, what condition and which relatives?   No    Current Medications:   (please update all dermatological medications before printing patient's AVS!)      Current Outpatient Medications:     acetaminophen (TYLENOL) 325 mg tablet, Take 2 tablets (650 mg total) by mouth every 4 (four) hours as needed for mild pain, Disp: 30 tablet, Rfl: 0    Calcium Carbonate-Vitamin D (CALCIUM 500/D PO), Take by mouth, Disp: , Rfl:     hydrocortisone 2 5 % cream, Apply topically 2 (two) times a day Apply to face for 1 week only  , Disp: 28 g, Rfl: 0    metroNIDAZOLE (METROCREAM) 0 75 % cream, Apply topically 2 (two) times a day Apply to cheeks and nose, Disp: 45 g, Rfl: 3    Multiple Vitamin (MULTIVITAMIN) capsule, Take 1 capsule by mouth daily, Disp: , Rfl:     Omega-3 Fatty Acids (FISH OIL BURP-LESS PO), Take by mouth, Disp: , Rfl:       Review of Systems:  Have you recently had or currently have any of the following? If YES, what are you doing for the problem? · Fever, chills or unintended weight loss: No  · Sudden loss or change in your vision: No  · Nausea, vomiting or blood in your stool: No  · Painful or swollen joints: No  · Wheezing or cough: No  · Changing mole or non-healing wound: No  · Nosebleeds: No  · Excessive sweating: No  · Easy or prolonged bleeding? No  · Over the last 2 weeks, how often have you been bothered by the following problems? · Taking little interest or pleasure in doing things: 1 - Not at All  · Feeling down, depressed, or hopeless: 1 - Not at All  · Rapid heartbeat with epinephrine:  No    · FEMALES ONLY:    · Are you pregnant or planning to become pregnant? No  · Are you currently or planning to be nursing or breast feeding? YES    · Any known allergies? No Known Allergies      Physical Exam:     Was a chaperone (Derm Clinical Assistant) present throughout the entire Physical Exam? Yes     Did the Dermatology Team specifically  the patient on the importance of a Full Skin Exam to be sure that nothing is missed clinically?  Yes}  o Did the patient ultimately request or accept a Full Skin Exam?  NO  o Did the patient specifically refuse to have the areas "under-the-bra" examined by the Dermatologist? No  o Did the patient specifically refuse to have the areas "under-the-underwear" examined by the Dermatologist? No    CONSTITUTIONAL:   Vitals:    02/24/21 1126   Temp: 98 3 °F (36 8 °C)   Weight: 73 5 kg (162 lb)   Height: 5' 4 57" (1 64 m)         PSYCH: Normal mood and affect  EYES: Normal conjunctiva  ENT: Normal lips and oral mucosa  CARDIOVASCULAR: No edema  RESPIRATORY: Normal respirations  HEME/LYMPH/IMMUNO:  No regional lymphadenopathy except as noted below in "ASSESSMENT AND PLAN BY DIAGNOSIS"    SKIN:  FULL ORGAN SYSTEM EXAM   Hair, Scalp, Ears, Face Normal except as noted below in Assessment   Neck, Cervical Chain Nodes    Right Arm/Hand/Fingers Normal except as noted below in Assessment   Left Arm/Hand/Fingers Normal except as noted below in Assessment   Chest/Breasts/Axillae    Abdomen, Umbilicus Normal except as noted below in Assessment   Back/Spine Normal except as noted below in Assessment   Groin/Genitalia/Buttocks NOT EXAMINED   Right Leg, Foot, Toes Normal except as noted below in Assessment   Left Leg, Foot, Toes Normal except as noted below in Assessment        Assessment and Plan by Diagnosis:    History of Present Condition:     Duration:  How long has this been an issue for you?    o  Patient states the rash last occurred 2 months ago  o The facial redness has been an issue for weeks to months   Location Affected:  Where on the body is this affecting you?    o  Rash affected arms, abdomen, upper thighs   Quality:  Is there any bleeding, pain, itch, burning/irritation, or redness associated with the skin lesion?    o  Rash was itchy when it occured   Severity:  Describe any bleeding, pain, itch, burning/irritation, or redness on a scale of 1 to 10 (with 10 being the worst)  o  n/a   Timing:  Does this condition seem to be there pretty constantly or do you notice it more at specific times throughout the day?     o  constant when it occured   Context:  Have you ever noticed that this condition seems to be associated with specific activities you do?    o  no   Modifying Factors:    o Anything that seems to make the condition worse?    -  no  o What have you tried to do to make the condition better? -  Rash resolved on its own   Associated Signs and Symptoms:  Does this skin lesion seem to be associated with any of the following:  o  SL AMB DERM SIGNS AND SYMPTOMS: Itching and Scratching     Begin "ASSESSMENT AND PLAN BY DIAGNOSIS" here    Nonspecific rash  Physical Exam:   Anatomic Location Affected:  Per patient, affected the arms, abdomen and upper legs   Morphological Description:  No rash today  Patient does not have photos   Pertinent Positives:   Pertinent Negatives: Additional History of Present Condition:  Patient states the rash last occurred 2 months ago and resolved on its own    Assessment and Plan:  Based on a thorough discussion of this condition and the management approach to it (including a comprehensive discussion of the known risks, side effects and potential benefits of treatment), the patient (family) agrees to implement the following specific plan:   Patient instructed to call for an appointment if the rash reoccurs  Also instructed to take photos of the rash in case it resolves by the time she is seen in clinic  ROSACEA    Physical Exam:   Anatomic Location Affected:  Cheeks, nose   Morphological Description:  Erythema with papules   Pertinent Positives:   Pertinent Negatives:     Additional History of Present Condition:  Patient states her nose and cheeks are consistently red    Assessment and Plan:  Based on a thorough discussion of this condition and the management approach to it (including a comprehensive discussion of the known risks, side effects and potential benefits of treatment), the patient (family) agrees to implement the following specific plan:   Start hydrocortisone 2 5% cream  Apply to face twice a day for 1 week only    After 1 week of treatment with hydrocortisone, apply metronidazole cream to the face twice a day   Use a moisturizer + sunscreen "combo" product such as Neutrogena Daily Defense SPF 50+ or CeraVe AM at least three times a day   Continue moisturizing your face with a moisturizer of your choice  Moisturizers such as Eucerin, CeraVe or Cetaphil are good for sensitive skin        Rosacea is a chronic rash affecting the mid-face including the nose, cheeks, chin, forehead, and eyelids  The incidence is usually greatest between the ages of 30-60 years and is more common in people with fair skin  Common characteristics include redness, telangiectasias, papules and pustules over affected areas  Rosacea may look similar to acne, but there is a lack of comedones  Occasionally the eyes may also be involved in ocular rosacea  In advanced disease, enlargement of the sebaceous glands in the nose, termed rhinophyma, may be present  Rosacea results in red spots (papules) and sometimes pustules over the face, but unlike acne there are no blackheads, whiteheads, or cystic nodules  Patients often experience increased facial flushing with prominent blood vessels (erythematotelangiectatic rosacea) and dry, sensitive skin  These symptoms are exacerbated by sun exposure, hot or spicy foods, topical steroids and oil-based facial products  In ocular rosacea, eyelids may be red and sore due to conjunctivitis, keratitis, and episcleritis  If rhinophyma develops due to enlargement of sebaceous glands, the patient may have an enlarged and irregularly shaped nose with prominent pores  In rosacea that is refractory to treatment, patients can develop persistent redness and swelling of the face due to lymphatic obstruction (Morbihan disease)  Distribution around the cheeks may be confused with the malar or butterfly rash of lupus  However, the rash of lupus spares the nasal creases and lacks papules and pustules   If signs of photosensitivity, oral ulcers, arthritis, and kidney dysfunction are present then consider referral to a rheumatologist      There are many potential causes of rosacea including genetic, environmental, vascular, and inflammatory factors  These include, but are not limited to:   Chronic exposure to ultraviolet radiation    Increased immune responses in the form of cathelicidins that promote vessel dilation and infiltration with white blood cells (neutrophils) into the dermis   Increased matrix metalloproteinases such as collagen and elastase that remodel normal tissue may contribute to inflammation of the skin making it thicker and harder   There is some evidence to suggest that increased numbers of demodex mites on patient skin may contribute to rosacea papules     General Treatment Approach    Avoid exacerbating factors such as heat, spicy foods, and alcohol    Use daily SPF30+ sunscreen and other methods of coverage for sun protection   Use water-based make-up    Avoid applying topical steroids to affected areas as they can cause perioral dermatitis and exacerbate rosacea     Topical Treatment Approach   Metronidazole cream or gel by itself or in combination with oral antibiotics for more severe cases   Azelaic acid cream or lotion is effective for mild inflammatory rosacea when applied twice daily to affected areas   Brimonidine gel and oxymetazoline hydrochloride cream can reduce facial redness temporarily    Ivermectin cream can treat papulopustular rosacea by controlling demodex mites and inflammation    Pimecrolimus cream or tacrolimus ointment twice a day for 2-3 months can help reduce inflammation    Oral Treatment Approach   Antibiotics such as doxycycline, minocycline, or erythromycin for 1-3 months   Clonidine and carvedilol can help reduce facial flushing and are generally well tolerated   Common side effects include low blood pressure, gastrointestinal upset, dry eyes, blurred vision and low heart rate   Isotretinoin at low doses can be effective for long term treatment when antibiotics fail  Side effects may make it unsuitable for some patients   NSAIDs such as diclofenac can help reduce discomfort and redness in the skin       Procedural/Surgical Treatment Approach    Vascular lasers or intense pulsed light treatment may be used to treat persistent telangiectasia and papulopustular rosacea   Plastic surgery and carbon dioxide lasers may be used to treat rhinophyma

## 2021-02-24 NOTE — PATIENT INSTRUCTIONS
ROSACEA    Assessment and Plan:  Based on a thorough discussion of this condition and the management approach to it (including a comprehensive discussion of the known risks, side effects and potential benefits of treatment), the patient (family) agrees to implement the following specific plan:   Start hydrocortisone 2 5% cream  Apply to face twice a day for 1 week only   After 1 week of treatment with hydrocortisone, apply metronidazole cream to the face twice a day   Use a moisturizer + sunscreen "combo" product such as Neutrogena Daily Defense SPF 50+ or CeraVe AM at least three times a day   Continue moisturizing your face with a moisturizer of your choice  Moisturizers such as Eucerin, CeraVe or Cetaphil are good for sensitive skin        Rosacea is a chronic rash affecting the mid-face including the nose, cheeks, chin, forehead, and eyelids  The incidence is usually greatest between the ages of 30-60 years and is more common in people with fair skin  Common characteristics include redness, telangiectasias, papules and pustules over affected areas  Rosacea may look similar to acne, but there is a lack of comedones  Occasionally the eyes may also be involved in ocular rosacea  In advanced disease, enlargement of the sebaceous glands in the nose, termed rhinophyma, may be present  Rosacea results in red spots (papules) and sometimes pustules over the face, but unlike acne there are no blackheads, whiteheads, or cystic nodules  Patients often experience increased facial flushing with prominent blood vessels (erythematotelangiectatic rosacea) and dry, sensitive skin  These symptoms are exacerbated by sun exposure, hot or spicy foods, topical steroids and oil-based facial products  In ocular rosacea, eyelids may be red and sore due to conjunctivitis, keratitis, and episcleritis   If rhinophyma develops due to enlargement of sebaceous glands, the patient may have an enlarged and irregularly shaped nose with prominent pores  In rosacea that is refractory to treatment, patients can develop persistent redness and swelling of the face due to lymphatic obstruction (Morbihan disease)  Distribution around the cheeks may be confused with the malar or butterfly rash of lupus  However, the rash of lupus spares the nasal creases and lacks papules and pustules  If signs of photosensitivity, oral ulcers, arthritis, and kidney dysfunction are present then consider referral to a rheumatologist      There are many potential causes of rosacea including genetic, environmental, vascular, and inflammatory factors   These include, but are not limited to:   Chronic exposure to ultraviolet radiation    Increased immune responses in the form of cathelicidins that promote vessel dilation and infiltration with white blood cells (neutrophils) into the dermis   Increased matrix metalloproteinases such as collagen and elastase that remodel normal tissue may contribute to inflammation of the skin making it thicker and harder   There is some evidence to suggest that increased numbers of demodex mites on patient skin may contribute to rosacea papules     General Treatment Approach    Avoid exacerbating factors such as heat, spicy foods, and alcohol    Use daily SPF30+ sunscreen and other methods of coverage for sun protection   Use water-based make-up    Avoid applying topical steroids to affected areas as they can cause perioral dermatitis and exacerbate rosacea     Topical Treatment Approach   Metronidazole cream or gel by itself or in combination with oral antibiotics for more severe cases   Azelaic acid cream or lotion is effective for mild inflammatory rosacea when applied twice daily to affected areas   Brimonidine gel and oxymetazoline hydrochloride cream can reduce facial redness temporarily    Ivermectin cream can treat papulopustular rosacea by controlling demodex mites and inflammation    Pimecrolimus cream or tacrolimus ointment twice a day for 2-3 months can help reduce inflammation    Oral Treatment Approach   Antibiotics such as doxycycline, minocycline, or erythromycin for 1-3 months   Clonidine and carvedilol can help reduce facial flushing and are generally well tolerated  Common side effects include low blood pressure, gastrointestinal upset, dry eyes, blurred vision and low heart rate   Isotretinoin at low doses can be effective for long term treatment when antibiotics fail  Side effects may make it unsuitable for some patients   NSAIDs such as diclofenac can help reduce discomfort and redness in the skin       Procedural/Surgical Treatment Approach    Vascular lasers or intense pulsed light treatment may be used to treat persistent telangiectasia and papulopustular rosacea   Plastic surgery and carbon dioxide lasers may be used to treat rhinophyma

## 2021-02-26 ENCOUNTER — TELEPHONE (OUTPATIENT)
Dept: INTERNAL MEDICINE CLINIC | Facility: CLINIC | Age: 31
End: 2021-02-26

## 2021-02-26 NOTE — TELEPHONE ENCOUNTER
Please see documentation below of patient's reaction to cortisone cream   Patient was originally scheduled an appointment with PCP, but should be discussed with Dermatology  Please advise how/what patient should use moving forward  Does patient need to be seen for an appointment?

## 2021-02-26 NOTE — TELEPHONE ENCOUNTER
Patient's  called    Patient saw derm on 2/24/21 and started using the cortisone cream they prescribed but she is having a reaction  Per her  she is experiencing burning and excessive redness  Per [place], [place]- patient was advised to schedule same day appt in our office for further assessment       Appt was scheduled for today at 3:20pm

## 2021-03-02 NOTE — TELEPHONE ENCOUNTER
I called patient and she agreed to come in tomorrow 3/3/21 at [address] at 10:30 am for a Dermatology appointment  FYI: I placed her on a 12:30pm slot, but asked her to come in at 10:30am  Dr Sarwat Leonard is on call this week, she has been notified as well

## 2021-03-02 NOTE — TELEPHONE ENCOUNTER
Derm staff: Please call patient and schedule for follow up this week due to reaction on face - with any physician       We will take care of it Josr Merino

## 2021-03-02 NOTE — TELEPHONE ENCOUNTER
Dr Yanely Capone, This patient has a referral for [facility]  Do you want me to schedule it there or to your schedule in the office?

## 2021-03-02 NOTE — TELEPHONE ENCOUNTER
[facility]book [facility]   Please coordinate with [facility] staff if needed to book her

## 2021-03-03 ENCOUNTER — OFFICE VISIT (OUTPATIENT)
Dept: MULTI SPECIALTY CLINIC | Facility: CLINIC | Age: 31
End: 2021-03-03

## 2021-03-03 VITALS — WEIGHT: 162.04 LBS | BODY MASS INDEX: 27 KG/M2 | TEMPERATURE: 97.8 F | HEIGHT: 65 IN

## 2021-03-03 DIAGNOSIS — L71.9 ROSACEA: Primary | ICD-10-CM

## 2021-03-03 PROCEDURE — 99213 OFFICE O/P EST LOW 20 MIN: CPT | Performed by: DERMATOLOGY

## 2021-03-03 NOTE — PROGRESS NOTES
[facility] Follow Up Note    Patient Name: Jesus Rodriguez  Encounter Date: 3/3/21    Today's Chief Concerns:  [place] Concern #1:  Rash   Concern #2:  Rosacea    Current Medications:    Current Outpatient Medications:     acetaminophen (TYLENOL) 325 mg tablet, Take 2 tablets (650 mg total) by mouth every 4 (four) hours as needed for mild pain, Disp: 30 tablet, Rfl: 0    Calcium Carbonate-Vitamin D (CALCIUM 500/D PO), Take by mouth, Disp: , Rfl:     hydrocortisone 2 5 % cream, Apply topically 2 (two) times a day Apply to face for 1 week only  , Disp: 28 g, Rfl: 0    metroNIDAZOLE (METROCREAM) 0 75 % cream, Apply topically 2 (two) times a day Apply to cheeks and nose, Disp: 45 g, Rfl: 3    Multiple Vitamin (MULTIVITAMIN) capsule, Take 1 capsule by mouth daily, Disp: , Rfl:     Omega-3 Fatty Acids (FISH OIL BURP-LESS PO), Take by mouth, Disp: , Rfl:     CONSTITUTIONAL:   Vitals:    03/03/21 1340   Temp: 97 8 °F (36 6 °C)   Weight: 73 5 kg (162 lb 0 6 oz)   Height: 5' 5" (1 651 m)       Specific Alerts:    Have you been seen by a [facility]atologist in the last 3 years? YES    Are you pregnant or planning to become pregnant? No    Are you currently or planning to be nursing or breast feeding? YES    No Known Allergies    May we call your Preferred Phone number to discuss your specific medical information? YES    May we leave a detailed message that includes your specific medical information? YES    Have you traveled outside of the [facility] in the past 3 months? No    Do you currently have a pacemaker or defibrillator? No    Do you have any artificial heart valves, joints, plates, screws, rods, stents, pins, etc? No   - If Yes, were any placed within the last 2 years? Do you require any medications prior to a surgical procedure? No   - If Yes, for which procedure? - If Yes, what medications to you require?      Are you taking any medications that cause you to bleed more easily ("blood thinners") No    Have you ever experienced a rapid heartbeat with epinephrine? No    Have you ever been treated with "gold" (gold sodium thiomalate) therapy? No    [facility] can help with wrinkles, "laugh lines," facial volume loss, "double chin," "love handles," age spots, and more  Are you interested in learning today about some of the skin enhancement procedures that we offer? (If Yes, please provide more detail) No    Review of Systems:  Have you recently had or currently have any of the following? · Fever or chills: No  · Night Sweats: No  · Headaches: No  · Weight Gain: No  · Weight Loss: No  · Blurry Vision: No  · Nausea: No  · Vomiting: No  · Diarrhea: No  · Blood in Stool: No  · Abdominal Pain: No  · Itchy Skin: YES  · Painful Joints: No  · Swollen Joints: No  · Muscle Pain: No  · Irregular Mole: No  · Sun Burn: No  · Dry Skin: No  · Skin Color Changes: YES  · Scar or Keloid: No  · Cold Sores/Fever Blisters: No  · Bacterial Infections/MRSA: No  · Anxiety: No  · Depression: No  · Suicidal or Homicidal Thoughts: No      PSYCH: Normal mood and affect  EYES: Normal conjunctiva  ENT: Normal lips and oral mucosa  CARDIOVASCULAR: No edema  RESPIRATORY: Normal respirations  HEME/LYMPH/IMMUNO:  No regional lymphadenopathy except as noted below in ASSESSMENT AND PLAN BY DIAGNOSIS    FULL ORGAN SYSTEM SKIN EXAM (SKIN)    Hair, Scalp, Ears, Face Normal except as noted below in Assessment   Neck, Cervical Chain Nodes    Right Arm/Hand/Fingers    Left Arm/Hand/Fingers    Chest/Breasts/Axillae    Abdomen, Umbilicus    Back/Spine    Groin/Genitalia/Buttocks    Right Leg, Foot, Toes    Left Leg, Foot, Toes        ROSACEA    Physical Exam:   Anatomic Location Affected: Face (bilateral cheeks, forehead)   Morphological Description:  Mild erythema   Pertinent Positives:   Pertinent Negatives:     Additional History of Present Condition:      Assessment and Plan:  Based on a thorough discussion of this condition and the management approach to it (including a comprehensive discussion of the known risks, side effects and potential benefits of treatment), the patient (family) agrees to implement the following specific plan:   Patient previously given hydrocortisone 2 5% and metronidazole cream at appointment last week  She developed a rash following hydrocortisone treatment  She did not  the metronidazole as the pharmacy said the medication was not covered by insurance   We discussed that she should discontinue the hydrocortisone   We discussed the possibility that metronidazole cream may also cause redness and irritation given that she has sensitive skin and had a reaction to hydrocortisone   Because of patient's sensitive skin, recommend no treatment at this time   If the rosacea worsens, we can discuss possible treatment options in the future   Patient can continue gentle moisturizers and sunscreen for sensitive skin     Follow up as needed

## 2021-04-21 ENCOUNTER — OFFICE VISIT (OUTPATIENT)
Dept: INTERNAL MEDICINE CLINIC | Facility: CLINIC | Age: 31
End: 2021-04-21

## 2021-04-21 VITALS
TEMPERATURE: 98.1 F | DIASTOLIC BLOOD PRESSURE: 71 MMHG | OXYGEN SATURATION: 97 % | WEIGHT: 154.2 LBS | BODY MASS INDEX: 25.69 KG/M2 | HEART RATE: 71 BPM | HEIGHT: 65 IN | SYSTOLIC BLOOD PRESSURE: 110 MMHG

## 2021-04-21 DIAGNOSIS — L71.9 ROSACEA: ICD-10-CM

## 2021-04-21 DIAGNOSIS — R21 RASH AND NONSPECIFIC SKIN ERUPTION: Primary | ICD-10-CM

## 2021-04-21 PROCEDURE — 99213 OFFICE O/P EST LOW 20 MIN: CPT | Performed by: PHYSICIAN ASSISTANT

## 2021-04-21 NOTE — TELEPHONE ENCOUNTER
Please call patient and let her know that unfortunately her insurance will not be covering the metronidazole topical I ordered for her face  I picked this medicine because this is what I commonly use for rosacea and what the dermatologist recommended in his note  Unfortunately the ointments that the insurance is covering I am not familiar with how well they work for the rosacea, so I would advise patient consider making the appt with derm, whether its in the office here or at different office that will take her insurance

## 2021-04-21 NOTE — PATIENT INSTRUCTIONS
Rosacea   WHAT YOU NEED TO KNOW:   What is rosacea? Rosacea is a long-term skin condition that causes inflammation of your face  Rosacea usually affects the cheeks and chin  It may also affect the nose, forehead, and eyes  There is no known cause of rosacea  Healthcare providers believe the facial redness occurs when blood vessels in your face widen  Rosacea is more common in women, people with fair skin, and those aged 27 or older  What are the signs and symptoms of rosacea? Your face will be red with red bumps  The bumps often look like acne pimples  Symptoms range from mild to severe  You may have periods of time with no symptoms  When you have symptoms, this is called a flare  Some signs and symptoms will depend on the type of rosacea you have  The 4 types of rosacea are:  · Erythematotelangiectatic:  This is also called ETR  Your face may be red and swollen for long periods  Your skin may burn, itch, or sting more easily when you use creams or lotions on your face  Your skin may also be drier than with other types of rosacea  · Papulopustular: Your skin may swell, burn, or sting  The bumps on your skin may be filled with pus  · Phymatous: This type causes your skin on your nose, chin, forehead, cheeks, eyelids, or ears to thicken  Your nose may also look bumpy  · Ocular: This type of rosacea affects your eyes  You may have dry or watery, red eyes  Your eyes may burn, sting, or itch  You may have eyelid swelling or feel like you have something in your eye  You may also have blurry vision  Your eyes may hurt when you are in bright light  What increases my risk for rosacea flares? · Heavy exercise    · Hot drinks or drinks that contain alcohol    · Spicy foods    · Stress, sudden laughter, or embarrassment    · Sun exposure    · Very hot, cold, or windy weather    How is rosacea diagnosed? Your healthcare provider will ask about your signs and symptoms   He may know you have rosacea by looking at your skin  Your healthcare provider may also do blood tests to learn if another medical condition is the cause of your symptoms  How is rosacea treated? Rosacea has no cure  With treatment, your symptoms can be controlled  Ask your healthcare provider for more information about the following treatments:  · Antibiotic medicines: This medicine is given to treat or prevent infection  Antibiotics may also help decrease swelling, redness, and acne-like bumps  This medicine may be given as a pill or a cream to apply on your face  · Artificial tears: These help keep your eyes moist if you have dryness from ocular rosacea  · Laser ablation:  A laser removes tissue buildup and reduces redness and swelling  · Surgery: You may need surgery to remove thickened skin on your face  How can I prevent a rosacea flare? · Avoid hot drinks or drinks that contain alcohol  · Avoid being in the sun for long periods of time  Use sunscreen that is SPF 15 or higher every time you go outside  Wear a wide-brimmed hat while you are outdoors  · Avoid skin care products that have alcohol, menthol, or salt in them  Use fragrance-free products to wash your face  Be gentle when you wash your face to avoid irritation  Ask your healthcare provider which products are best to treat dry skin  · Clean your eyelids as directed  Your healthcare provider may suggest you put warm compresses on your eyes 2 times each day  When should I contact my healthcare provider? · You have new or worse eye redness or itching  · You feel depressed about the look of your skin  · You have questions about your condition or care  When should I seek immediate care or call 911? · You have new or increased blurry vision, or you have vision loss  CARE AGREEMENT:   You have the right to help plan your care  Learn about your health condition and how it may be treated   Discuss treatment options with your healthcare providers to decide what care you want to receive  You always have the right to refuse treatment  The above information is an  only  It is not intended as medical advice for individual conditions or treatments  Talk to your doctor, nurse or pharmacist before following any medical regimen to see if it is safe and effective for you  © Copyright [address] Information is for End User's use only and may not be sold, redistributed or otherwise used for commercial purposes   All illustrations and images included in CareNotes® are the copyrighted property of A D A M , Inc  or [address] [place] [address]

## 2021-04-21 NOTE — TELEPHONE ENCOUNTER
Call pharmacy to see what the issue is with the metronidazole  I would want to avoid a prior Auth as she should have been seeing dermatology for this medicine anyway    Is it the dose or the name of the topical in general?

## 2021-04-21 NOTE — PROGRESS NOTES
Assessment/Plan:      Diagnoses and all orders for this visit:    Rash and nonspecific skin eruption  -     Ambulatory referral to Allergy; Future    Rosacea  -     metroNIDAZOLE (METROCREAM) 0 75 % cream; Apply topically 2 (two) times a day For rosacea    Other orders  -     Sodium Fluoride 5000 Enamel 1 1-5 % PSTE; APPLY SMALL RIBBON TO TEETH DAILY  DO NOT RINSE AFTERWARDS  77-year-old female presenting today with assistance from telephone [employer] [place]  to discuss concern of intermittent rash of unknown etiology as well as what she can use for her rosacea  She just saw Dermatology here at the clinic about a month ago for same discussion but states she was told they could not help her if there were no pictures of the rash in she did not have any active rash at the time  It also appears that they had recommended hydrocortisone to her face for the rosacea as needed however it made her face more red and sensitive so it seems as though they were hesitant to prescribe metronidazole topically  Patient did bring a picture of the rash which appears as multiple red papules, very nonspecific, stating that they occur for 1/2 hour and are itchy and then resolve on their own  She noted most recent rash yesterday after intercourse on her abdomen and her back so I question may be a rash from heat or sweating  She denies any new hygiene products and again the rash resolves without any significant treatment but states that it does get a little worse after she scratches at it  Rash sounds to be consistent with some sort of allergy or histamine response though again causes are unclear  Patient insists that she would like allergy testing which I am uncertain if this will be helpful or not  Initially I did advised patient to follow-up with dermatology however she did not find it helpful so I will refer her to an allergist to see if they could be of more assistance    I did offer topical triamcinolone that she could apply to the rash if it is very itchy however she declines since the rash eventually goes away on its own  Patient also asking which she could use for the rosacea which I have mainly had experience with the topical metronidazole which is what it appears dermatology was considering should she be willing to try topical for this condition  Unfortunately after the visit I received a message that metronidazole was not covered under the insurance and require prior authorization otherwise trying topicals like Neosporin or gentamicin  I did already advised patient consider scheduling a follow-up with the dermatologist for the rosacea to make sure it is improving so I have staff contacting patient to notify her that the medicine I was going to prescribe is not covered and I would highly encourage she discuss other options for treatment with Dermatology here at the clinic  She also has the option of contacting her insurance to see where else she can go if she would like a 2nd opinion  Chief Complaint   Patient presents with    Follow-up     rash not an specific location for the past month ,see dermatologist for this in the past        Subjective:     Patient ID: Ade Desai is a 26 y o  female  31y/o female here today for discussion of rash  ADITU SAS Divehi  USED FOR TODAYS VISIT  She already saw dermatology last month for rosacea and "rash"  States the rash is the same that she saw dermatology for  States when the rash comes  And goes and is visible for 1/2 hour and then goes away on its own  Also having intercourse with  she gets rash on back and abdomen but then goes away on its own  She has a picture to show on phone today (red spots)  Per documentation derm note, trial hydrocortisone but pt stated it made her face red  They advised metronidazole topical but again was concerned about sensitivity so they advised no tx and f/u as needed       She did not schedule appt to f/u with dermatologist     States the rash starts first and then gets itchy  States they have not changed condoms or brands  States she has had this rash intermittent for past few years years that comes and goes, and worse over past month  States may go away for months and return without warning  She does not have any active rashes today  Last rash episode was last night, but mild  She is not using any treatment OTC  Patient is currently breast feeding and plans on continuing breast feeding for a year more  Pt also asks what can be done about rosacea, and asking if it will get worse  Review of Systems   Constitutional: Negative  Skin:        As in HPI         The following portions of the patient's history were reviewed and updated as appropriate: allergies, current medications, past family history, past medical history, past social history, past surgical history and problem list       Objective:     Physical Exam  Vitals signs reviewed  Constitutional:       General: She is not in acute distress  Appearance: Normal appearance  She is not ill-appearing or toxic-appearing  Cardiovascular:      Rate and Rhythm: Normal rate and regular rhythm  Pulmonary:      Effort: Pulmonary effort is normal    Skin:     Comments: No visible rash noted on body  General face with mild patchy erythema, very faint, mainly cheeks and forehead, generally dry  No lesions   Neurological:      Mental Status: She is alert and oriented to person, place, and time     Psychiatric:         Mood and Affect: Mood normal          Behavior: Behavior normal          Vitals:    04/21/21 1110   BP: 110/71   BP Location: Left arm   Patient Position: Sitting   Cuff Size: Standard   Pulse: 71   Temp: 98 1 °F (36 7 °C)   TempSrc: Temporal   SpO2: 97%   Weight: 69 9 kg (154 lb 3 2 oz)   Height: 5' 5" (1 651 m)

## 2021-04-21 NOTE — TELEPHONE ENCOUNTER
Metronidazole is not formulary  As per patient's insurance, the following medications are formulary topical antibiotics:    Bacitracin Ointment  Bacitracin Packet  Bacitracin Zinc Ointment  Double Antibiotic Ointment (Bacitracin-Polymyxin)  Gentamicin Cream  Gentamicin Ointment  Mupirocin Ointment  Poly Bacitracin Ointment (Bacitracin-Polymyxin)   Triple Antibiotic Ointment (Neomycin-Bacitracin-Polymyxin)   Triple Antibiotic Ointment Packet (Neomycin-Bacitracin-Polymyxin)   Triple Antibiotic Plus Ointment (Neomycin-Bacitracin-PolymyxinPramoxine)    Any other medication will require prior authorization

## 2021-04-22 ENCOUNTER — IMMUNIZATIONS (OUTPATIENT)
Dept: FAMILY MEDICINE CLINIC | Facility: HOSPITAL | Age: 31
End: 2021-04-22

## 2021-04-22 DIAGNOSIS — Z23 ENCOUNTER FOR IMMUNIZATION: Primary | ICD-10-CM

## 2021-04-22 PROCEDURE — 91300 SARS-COV-2 / COVID-19 MRNA VACCINE (PFIZER-BIONTECH) 30 MCG: CPT

## 2021-04-22 PROCEDURE — 0001A SARS-COV-2 / COVID-19 MRNA VACCINE (PFIZER-BIONTECH) 30 MCG: CPT

## 2021-04-22 NOTE — TELEPHONE ENCOUNTER
Patient  returned call and I made him aware per note, he understood  Cant get an appointment until September with dermatology and they want this cleared up before then, I advised them they can reach out to their insurance and see if they accept any other dermatologies and they can reach out to that office and see if they can get a sooner appointment   understood and had no further questions

## 2021-04-22 NOTE — TELEPHONE ENCOUNTER
Called using  services,  Adrien answered and then disconnected   attempted to contact patient again, patient answered but it was complete silence   Will attempt again at a later time

## 2021-05-11 ENCOUNTER — TELEPHONE (OUTPATIENT)
Dept: INTERNAL MEDICINE CLINIC | Facility: CLINIC | Age: 31
End: 2021-05-11

## 2021-05-11 NOTE — TELEPHONE ENCOUNTER
For Taoist purposes she would STRICTLY only like to be seen by a female provider  For every appointment moving forward

## 2021-05-14 ENCOUNTER — IMMUNIZATIONS (OUTPATIENT)
Dept: FAMILY MEDICINE CLINIC | Facility: HOSPITAL | Age: 31
End: 2021-05-14

## 2021-05-14 DIAGNOSIS — Z23 ENCOUNTER FOR IMMUNIZATION: Primary | ICD-10-CM

## 2021-05-14 PROCEDURE — 0002A SARS-COV-2 / COVID-19 MRNA VACCINE (PFIZER-BIONTECH) 30 MCG: CPT

## 2021-05-14 PROCEDURE — 91300 SARS-COV-2 / COVID-19 MRNA VACCINE (PFIZER-BIONTECH) 30 MCG: CPT

## 2021-05-25 ENCOUNTER — OFFICE VISIT (OUTPATIENT)
Dept: DERMATOLOGY | Facility: CLINIC | Age: 31
End: 2021-05-25
Payer: COMMERCIAL

## 2021-05-25 VITALS — BODY MASS INDEX: 24.99 KG/M2 | HEIGHT: 65 IN | WEIGHT: 150 LBS

## 2021-05-25 DIAGNOSIS — L71.9 ROSACEA: Primary | ICD-10-CM

## 2021-05-25 PROCEDURE — 1036F TOBACCO NON-USER: CPT | Performed by: DERMATOLOGY

## 2021-05-25 PROCEDURE — 99203 OFFICE O/P NEW LOW 30 MIN: CPT | Performed by: DERMATOLOGY

## 2021-05-25 PROCEDURE — 3008F BODY MASS INDEX DOCD: CPT | Performed by: DERMATOLOGY

## 2021-05-25 RX ORDER — OXYMETAZOLINE HYDROCHLORIDE 1 G/100G
CREAM TOPICAL
Qty: 30 G | Refills: 3 | Status: SHIPPED | OUTPATIENT
Start: 2021-05-25 | End: 2021-07-15

## 2021-05-25 NOTE — PROGRESS NOTES
[facility] Note     Patient Name: Milton Clifton  Encounter Date: 5/25/2021     Have you been cared for by a [facility] in the last 3 years and, if so, which one? No    · Have you traveled outside of the [address] [place] [address] in the past 3 months or outside of the [facility] area in the last 2 weeks? No     May we call your Preferred Phone number to discuss your specific medical information? Yes     May we leave a detailed message that includes your specific medical information? Yes      Today's Chief Concerns:   Concern #1:  Rash    Past Medical History:  Have you personally ever had or currently have any of the following? · Skin cancer (such as Melanoma, Basal Cell Carcinoma, Squamous Cell Carcinoma? (If Yes, please provide more detail)- No  · Eczema: No  · Psoriasis: No  · HIV/AIDS: No  · Hepatitis B or C: No  · Tuberculosis: No  · Systemic Immunosuppression such as Diabetes, Biologic or Immunotherapy, Chemotherapy, Organ Transplantation, Bone Marrow Transplantation (If YES, please provide more detail): No  · Radiation Treatment (If YES, please provide more detail): No  · Any other major medical conditions/concerns? (If Yes, which types)- No      Family History:  Have any of your "first degree relatives" (parent, brother, sister, or child) had any of the following       · Skin cancer such as Melanoma or Merkel Cell Carcinoma or Pancreatic Cancer? No  · Eczema, Asthma, Hay Fever or Seasonal Allergies: No  · Psoriasis or Psoriatic Arthritis: No  · Do any other medical conditions seem to run in your family? If Yes, what condition and which relatives?   No    Current Medications:   (please update all dermatological medications before printing patient's AVS!)      Current Outpatient Medications:     acetaminophen (TYLENOL) 325 mg tablet, Take 2 tablets (650 mg total) by mouth every 4 (four) hours as needed for mild pain, Disp: 30 tablet, Rfl: 0    Calcium Carbonate-Vitamin D (CALCIUM 500/D PO), Take by mouth, Disp: , Rfl:     metroNIDAZOLE (METROCREAM) 0 75 % cream, Apply topically 2 (two) times a day For rosacea (Patient not taking: Reported on 5/11/2021), Disp: 45 g, Rfl: 3    Multiple Vitamin (MULTIVITAMIN) capsule, Take 1 capsule by mouth daily, Disp: , Rfl:     Omega-3 Fatty Acids (FISH OIL BURP-LESS PO), Take by mouth, Disp: , Rfl:     Sodium Fluoride 5000 Enamel 1 1-5 % PSTE, APPLY SMALL RIBBON TO TEETH DAILY  DO NOT RINSE AFTERWARDS , Disp: , Rfl:       Review of Systems:  Have you recently had or currently have any of the following? If YES, what are you doing for the problem? · Fever, chills or unintended weight loss: No  · Sudden loss or change in your vision: No  · Nausea, vomiting or blood in your stool: No  · Painful or swollen joints: No  · Wheezing or cough: No  · Changing mole or non-healing wound: No  · Nosebleeds: No  · Excessive sweating: No  · Easy or prolonged bleeding? No  · Over the last 2 weeks, how often have you been bothered by the following problems? · Taking little interest or pleasure in doing things: 1 - Not at All  · Feeling down, depressed, or hopeless: 1 - Not at All  · Rapid heartbeat with epinephrine:  No    · FEMALES ONLY:    · Are you pregnant or planning to become pregnant? No  · Are you currently or planning to be nursing or breast feeding? No    · Any known allergies? · No Known Allergies      Physical Exam:     Was a chaperone (Derm Clinical Assistant) present throughout the entire Physical Exam? Yes     Did the Dermatology Team specifically  the patient on the importance of a Full Skin Exam to be sure that nothing is missed clinically?  Yes}  o Did the patient ultimately request or accept a Full Skin Exam?  NO  o Did the patient specifically refuse to have the areas "under-the-bra" examined by the Dermatologist? No  o Did the patient specifically refuse to have the areas "under-the-underwear" examined by the Dermatologist? No    CONSTITUTIONAL:   There were no vitals filed for this visit  PSYCH: Normal mood and affect  EYES: Normal conjunctiva  ENT: Normal lips and oral mucosa  CARDIOVASCULAR: No edema  RESPIRATORY: Normal respirations      SKIN:  FULL ORGAN SYSTEM EXAM   Ears, Face Normal except as noted below in Assessment   Neck, Normal except as noted below in Assessment                                        Assessment and Plan by Diagnosis:    History of Present Condition:     Duration:  How long has this been an issue for you?    o  2 months   Location Affected:  Where on the body is this affecting you?    o  Stomach, back   Quality:  Is there any bleeding, pain, itch, burning/irritation, or redness associated with the skin lesion?    o  Itching, and redness   Severity:  Describe any bleeding, pain, itch, burning/irritation, or redness on a scale of 1 to 10 (with 10 being the worst)  o  4   Timing:  Does this condition seem to be there pretty constantly or do you notice it more at specific times throughout the day?    o  Denies   Context:  Have you ever noticed that this condition seems to be associated with specific activities you do?    o  Denies   Modifying Factors:    o Anything that seems to make the condition worse?    -  Denies  o What have you tried to do to make the condition better? -  Hydrocortisone cream    Associated Signs and Symptoms:  Does this skin lesion seem to be associated with any of the following:  o  SL AMB DERM SIGNS AND SYMPTOMS: Redness and Itching and Scratching       1  ROSACEA    Physical Exam:   Anatomic Location Affected:  Bilateral cheeks, forehead, nose    Morphological Description:  Mild diffuse erythema       Additional History of Present Condition:  Patient presents redness on her cheeks for at least 2 months  Patient was give Hydrocortisone 2 5% denies making the rash better  The rash is not currently present today      Assessment and Plan:  Based on a thorough discussion of this condition and the management approach to it (including a comprehensive discussion of the known risks, side effects and potential benefits of treatment), the patient (family) agrees to implement the following specific plan:   Start rhofade daily in the morning  This medicine has been sent to [facility] order pharmacy and they will call you to confirm shipment and mail it to your house     Laser treatment for the rosacea: $300 (full face); $650 for 3 treatments   Treatments are done every month         Rosacea    What is rosacea? Rosacea (say: lalit) is a common skin disease that usually begins as a trend of flushing or blushing easily  As rosacea progresses, a persistent redness in the center of the face will develop and may gradually spread beyond the nose and cheeks to the forehead and chin  In some cases, the ears, chest, and back could be affected  Rosacea may appear as tiny blood vessels or small red bumps that occur in crops  Frequently they can contain pus, and are called pustules  If the bumps do not contain pus, they are referred to as papules  Rarely, in prolonged, untreated cases of rosacea, the oil glands of the nose and cheeks may become permanently enlarged  This is called rhinophyma, and is seen more frequently in men  Signs and Risks  In its beginning stages, rosacea tends to come and go, which makes it difficult to recognize  It can start as intermittent flushing of the face  Eventually, blood vessels may become permanently visible  Pustules and papules can appear, but can be mistaken for adult acne  People of all races, ages, genders and ethnic groups are at risk of developing rosacea  However, it is more common in women (especially around menopause) and adults with fair skin between the ages of 27 and 48  Treatment  Dermatologists typically recommend a combination of treatments to effectively manage rosacea    Treatment can improve symptoms and may stop the progression of the rosacea  Treatment may involve both topical and oral medications  The tetracycline antibiotics are often used for their anti-inflammatory effect; however, because of the possibility of developing antibiotic resistance, they should not be used long term at full dose  For dilated blood vessels the options include electrodessication (uses electric current through a small needle), laser treatment, and cosmetics to hide the redness  With all forms of treatment, improvement is a slow process, and patients may not see any results for the first 3-4 weeks  It is very important to avoid the sun and other triggers  Patients must wear sunscreen daily    Skin Care Instructions:  1  Cleanse the skin with a mild soap such as CeraVe cleanser, LaRoche, Cetaphil cleanser, or Dove soap once or twice daily as needed  2  Makeup should be non-comedogenic (won't clog pores) and be labeled for sensitive skin  Any product with a green tint tends to offset a red complexion  3  If your eyes are dry and irritated, use artificial tears 2-3 times per day and cleanse the eyelids daily with baby shampoo  Have your eyes examined at least every 2 years  Be sure to tell your eye doctor that you have rosacea  4  Alcoholic beverages tend to cause flushing of the skin, and may make rosacea worse  5  Always wear sunscreen, protect your skin from extreme hot and cold temperatures, and avoid spicy foods, hot drinks, and mechanical irritation such as rubbing, scrubbing, or massaging the face  Avoid harsh skin cleansers, cleansing masks, astringents, and exfoliation  If a particular product burns or makes your face feel tight, then it is likely to flare your rosacea  6  If you are having difficulty finding a sunscreen that you can tolerate, you may try switching to a chemical-free sunscreen  These are ones whose active ingredient is zinc oxide or titanium dioxide only    They should also be fragrance free, non-comedogenic, and labeled for sensitive skin  7  To combat the redness, use green-tinted moisturizers or make up ONLY on the red areas  Some examples include:  a  Dr Tray Mejia grass color correcting treatment   b  IT cosmetics Bye Bye Redness color correcting cream  c  Other brands that make green tinted primers include Smashbox, Make Up Forever, L'Oreal, CoverFx and many others     Rosacea triggers may vary from person to person  There are a variety of foods that have been reported to trigger rosacea  Some patients find that keeping a diary of what they were doing when they flared helps them avoid triggers      Scribe Attestation    I,:  Topher Jaime am acting as a scribe while in the presence of the attending physician :       I,:  Jewel Castillo MD personally performed the services described in this documentation    as scribed in my presence :

## 2021-05-25 NOTE — PATIENT INSTRUCTIONS
ROSACEA    Assessment and Plan:  Based on a thorough discussion of this condition and the management approach to it (including a comprehensive discussion of the known risks, side effects and potential benefits of treatment), the patient (family) agrees to implement the following specific plan:   Start rhofade daily in the morning  This medicine has been sent to [facility] order pharmacy and they will call you to confirm shipment and mail it to your house     Laser treatment for the rosacea: $300 (full face); $650 for 3 treatments   Treatments are done every month         Rosacea    What is rosacea? Rosacea (say: lalit) is a common skin disease that usually begins as a trend of flushing or blushing easily  As rosacea progresses, a persistent redness in the center of the face will develop and may gradually spread beyond the nose and cheeks to the forehead and chin  In some cases, the ears, chest, and back could be affected  Rosacea may appear as tiny blood vessels or small red bumps that occur in crops  Frequently they can contain pus, and are called pustules  If the bumps do not contain pus, they are referred to as papules  Rarely, in prolonged, untreated cases of rosacea, the oil glands of the nose and cheeks may become permanently enlarged  This is called rhinophyma, and is seen more frequently in men  Signs and Risks  In its beginning stages, rosacea tends to come and go, which makes it difficult to recognize  It can start as intermittent flushing of the face  Eventually, blood vessels may become permanently visible  Pustules and papules can appear, but can be mistaken for adult acne  People of all races, ages, genders and ethnic groups are at risk of developing rosacea  However, it is more common in women (especially around menopause) and adults with fair skin between the ages of 27 and 48      Treatment  Dermatologists typically recommend a combination of treatments to effectively manage rosacea  Treatment can improve symptoms and may stop the progression of the rosacea  Treatment may involve both topical and oral medications  The tetracycline antibiotics are often used for their anti-inflammatory effect; however, because of the possibility of developing antibiotic resistance, they should not be used long term at full dose  For dilated blood vessels the options include electrodessication (uses electric current through a small needle), laser treatment, and cosmetics to hide the redness  With all forms of treatment, improvement is a slow process, and patients may not see any results for the first 3-4 weeks  It is very important to avoid the sun and other triggers  Patients must wear sunscreen daily    Skin Care Instructions:  1  Cleanse the skin with a mild soap such as CeraVe cleanser, LaRoche, Cetaphil cleanser, or Dove soap once or twice daily as needed  2  Makeup should be non-comedogenic (won't clog pores) and be labeled for sensitive skin  Any product with a green tint tends to offset a red complexion  3  If your eyes are dry and irritated, use artificial tears 2-3 times per day and cleanse the eyelids daily with baby shampoo  Have your eyes examined at least every 2 years  Be sure to tell your eye doctor that you have rosacea  4  Alcoholic beverages tend to cause flushing of the skin, and may make rosacea worse  5  Always wear sunscreen, protect your skin from extreme hot and cold temperatures, and avoid spicy foods, hot drinks, and mechanical irritation such as rubbing, scrubbing, or massaging the face  Avoid harsh skin cleansers, cleansing masks, astringents, and exfoliation  If a particular product burns or makes your face feel tight, then it is likely to flare your rosacea  6  If you are having difficulty finding a sunscreen that you can tolerate, you may try switching to a chemical-free sunscreen    These are ones whose active ingredient is zinc oxide or titanium dioxide only  They should also be fragrance free, non-comedogenic, and labeled for sensitive skin  7  To combat the redness, use green-tinted moisturizers or make up ONLY on the red areas  Some examples include:  a  Dr Gale Castillo grass color correcting treatment   b  IT cosmetics Bye Bye Redness color correcting cream  c  Other brands that make green tinted primers include Smashbox, Make Up Forever, L'Oreal, CoverFx and many others     Rosacea triggers may vary from person to person  There are a variety of foods that have been reported to trigger rosacea  Some patients find that keeping a diary of what they were doing when they flared helps them avoid triggers

## 2021-05-27 ENCOUNTER — TELEPHONE (OUTPATIENT)
Dept: DERMATOLOGY | Age: 31
End: 2021-05-27

## 2021-05-27 DIAGNOSIS — L71.9 ROSACEA: Primary | ICD-10-CM

## 2021-05-27 NOTE — TELEPHONE ENCOUNTER
Pharmacy called   Rhofade is not covered by insurance  Patient has medicaid so she is not eligible for manufacturing coupon  The only thing that might be covered is metronidazole per pharmacist

## 2021-06-04 ENCOUNTER — TELEPHONE (OUTPATIENT)
Dept: DERMATOLOGY | Facility: CLINIC | Age: 31
End: 2021-06-04

## 2021-06-04 ENCOUNTER — OFFICE VISIT (OUTPATIENT)
Dept: INTERNAL MEDICINE CLINIC | Facility: CLINIC | Age: 31
End: 2021-06-04

## 2021-06-04 VITALS
TEMPERATURE: 97.2 F | WEIGHT: 147 LBS | HEART RATE: 91 BPM | SYSTOLIC BLOOD PRESSURE: 112 MMHG | OXYGEN SATURATION: 98 % | BODY MASS INDEX: 24.46 KG/M2 | DIASTOLIC BLOOD PRESSURE: 78 MMHG

## 2021-06-04 DIAGNOSIS — D17.0 LIPOMA OF HEAD: ICD-10-CM

## 2021-06-04 DIAGNOSIS — M62.838 MUSCLE SPASM: Primary | ICD-10-CM

## 2021-06-04 DIAGNOSIS — T14.8XXA MUSCLE STRAIN: ICD-10-CM

## 2021-06-04 DIAGNOSIS — Z00.00 ANNUAL PHYSICAL EXAM: ICD-10-CM

## 2021-06-04 PROCEDURE — 99204 OFFICE O/P NEW MOD 45 MIN: CPT | Performed by: INTERNAL MEDICINE

## 2021-06-04 RX ORDER — HYDROCORTISONE 0.5 %
CREAM (GRAM) TOPICAL 4 TIMES DAILY
Qty: 57 G | Refills: 2 | Status: SHIPPED | OUTPATIENT
Start: 2021-06-04 | End: 2022-02-22

## 2021-06-04 RX ORDER — ACETAMINOPHEN 325 MG/1
650 TABLET ORAL EVERY 4 HOURS PRN
Qty: 30 TABLET | Refills: 2
Start: 2021-06-04 | End: 2021-07-04

## 2021-06-04 NOTE — PROGRESS NOTES
[facility]  INTERNAL MEDICINE OFFICE VISIT     PATIENT INFORMATION     [place] [birth year] 26 y o  female   MRN: [id number]    ASSESSMENT/PLAN     Diagnoses and all orders for this visit:    Muscle spasm  -     Menthol-Methyl Salicylate (REMIGIO DAVISON GREASELESS) 10-15 % greaseless cream; Apply topically 4 (four) times a day Apply to affected areas  -     acetaminophen (TYLENOL) 325 mg tablet; Take 2 tablets (650 mg total) by mouth every 4 (four) hours as needed for mild pain or headaches    Annual physical exam  -     CBC and differential; Future  -     Basic metabolic panel; Future    Lipoma of head    Muscle strain      Obtain blood work in 6 months  Schedule a follow-up appointment in 6 months  HEALTH MAINTENANCE     Immunization History   Administered Date(s) Administered    DTaP 01/12/2017    SARS-CoV-2 / COVID-19 mRNA IM (Pfizer-BioNTech) 04/22/2021, 05/14/2021    Tdap 06/12/2017, 04/15/2020     CHIEF COMPLAINT     Chief Complaint   Patient presents with    Follow-up      HISTORY OF PRESENT ILLNESS     Armenian Interpretor: ID # [id number]    Pt is a 33yo [place] speaking F w PMh of rosacea  who presents to the clinic for a follow up regarding lipoma on crown of head  She states that it has been there for about 10 years and has grossly stayed the same  Denies any pain, itchiness, bleeding associated  No other areas are affected with similar lesion  She has not used any medication for this  Pt also complaining of right sided upper back pain which has been there for about 10 years also  Cannot state any alleviating or exacerbating factors  Pt stays at home, and takes care of 2 kids  Pt states that pain is very localized to the area and also affects the right side of the neck  On my exam, pain is exacerbated with palpation of right sided paraspinal muscle/ trapezius area as well as right sided neck muscles  Manipulation of the neck also reproduced pain in right sided neck   Otherwise FROM of neck and back  Regarding patient's lipoma, will continue to monitor as this has not caused any bothersome symptoms to the patient and therefore no reason to remove at this time  If however patient does develop any discomfort with the lipoma it can always be revisited for possible removal in the future  Patient understands and agrees  Regarding trapezius muscle spasm/strain, would like patient to apply topical BenGay to the area as well as warm compresses along with p r n  Tylenol  Discussed the risks and benefits muscle relaxants such as Robaxin which is a category C as she is breast-feeding and there have not been enough studies regarding the use of this medication  Therefore will hold off  Advised patient to increased physical activity, modify her posture/ sleeping position, avoid tight clothing  ROS otherwise negative except per hpi  REVIEW OF SYSTEMS     Review of Systems   Constitutional: Negative  HENT: Negative  Eyes: Negative  Respiratory: Negative  Cardiovascular: Negative  Gastrointestinal: Negative  Genitourinary: Negative  Musculoskeletal: Positive for back pain and neck pain  Skin:        Lesion on head   Neurological: Negative  Hematological: Negative  Psychiatric/Behavioral: Negative  OBJECTIVE     Vitals:    06/04/21 0904   BP: 112/78   BP Location: Left arm   Patient Position: Sitting   Cuff Size: Standard   Pulse: 91   Temp: (!) 97 2 °F (36 2 °C)   TempSrc: Temporal   SpO2: 98%   Weight: 66 7 kg (147 lb)     Physical Exam  Vitals signs and nursing note reviewed  Constitutional:       General: She is not in acute distress  Appearance: Normal appearance  She is normal weight  She is not toxic-appearing  HENT:      Head: Normocephalic and atraumatic  Nose: Nose normal       Mouth/Throat:      Mouth: Mucous membranes are dry  Eyes:      Extraocular Movements: Extraocular movements intact        Conjunctiva/sclera: Conjunctivae normal       Pupils: Pupils are equal, round, and reactive to light  Neck:      Musculoskeletal: Normal range of motion and neck supple  Muscular tenderness present  Cardiovascular:      Rate and Rhythm: Normal rate and regular rhythm  Pulses: Normal pulses  Heart sounds: Normal heart sounds  No murmur  Pulmonary:      Effort: Pulmonary effort is normal  No respiratory distress  Breath sounds: Normal breath sounds  No wheezing  Abdominal:      General: Abdomen is flat  Bowel sounds are normal  There is no distension  Palpations: Abdomen is soft  Tenderness: There is no abdominal tenderness  Musculoskeletal: Normal range of motion  General: Tenderness (tenderness to palpation in right sided trapezius area and right sided neck ) present  Skin:     General: Skin is warm and dry  Capillary Refill: Capillary refill takes less than 2 seconds  Neurological:      General: No focal deficit present  Mental Status: She is alert and oriented to person, place, and time  Mental status is at baseline  Psychiatric:         Mood and Affect: Mood normal          Behavior: Behavior normal          Thought Content: Thought content normal          Judgment: Judgment normal        CURRENT MEDICATIONS     Current Outpatient Medications:     acetaminophen (TYLENOL) 325 mg tablet, Take 2 tablets (650 mg total) by mouth every 4 (four) hours as needed for mild pain, Disp: 30 tablet, Rfl: 0    Calcium Carbonate-Vitamin D (CALCIUM 500/D PO), Take by mouth, Disp: , Rfl:     Multiple Vitamin (MULTIVITAMIN) capsule, Take 1 capsule by mouth daily, Disp: , Rfl:     Omega-3 Fatty Acids (FISH OIL BURP-LESS PO), Take by mouth, Disp: , Rfl:     Sodium Fluoride 5000 Enamel 1 1-5 % PSTE, APPLY SMALL RIBBON TO TEETH DAILY   DO NOT RINSE AFTERWARDS , Disp: , Rfl:     Azelaic Acid 15 % FOAM, Use once a day on the face (Patient not taking: Reported on 6/4/2021), Disp: 50 g, Rfl: 3   metroNIDAZOLE (METROCREAM) 0 75 % cream, Apply topically 2 (two) times a day For rosacea (Patient not taking: Reported on 2021), Disp: 45 g, Rfl: 3    Oxymetazoline HCl (Rhofade) 1 % CREA, Apply to the face every day in the morning (Patient not taking: Reported on 2021), Disp: 30 g, Rfl: 3    PAST MEDICAL & SURGICAL HISTORY     Past Medical History:   Diagnosis Date    Maternal excessive weight gain     57 lbs    Pruritic urticarial papules and plaques of pregnancy (puppp) 2017    Varicella w/o complication childhood    unsure     Past Surgical History:   Procedure Laterality Date    [place]  DELIVERY ONLY N/A 2017    Procedure:  SECTION ();   Surgeon: Hemanth Craig MD;  Location: [facility] ;  Service: Obstetrics    [place]  DELIVERY ONLY N/A 2020    Procedure:  SECTION () REPEAT;  Surgeon: Luis Angel Painting MD;  Location: [facility] ;  Service: Obstetrics     SOCIAL & FAMILY HISTORY     Social History     Socioeconomic History    Marital status: /Civil Union     Spouse name: Not on file    Number of children: Not on file    Years of education: Not on file    Highest education level: Not on file   Occupational History    Not on file   Social Needs    Financial resource strain: Not on file    Food insecurity     Worry: Not on file     Inability: Not on file    Transportation needs     Medical: Not on file     Non-medical: Not on file   Tobacco Use    Smoking status: Never Smoker    Smokeless tobacco: Never Used   Substance and Sexual Activity    Alcohol use: No    Drug use: No    Sexual activity: Yes     Partners: Male     Birth control/protection: None   Lifestyle    Physical activity     Days per week: Not on file     Minutes per session: Not on file    Stress: Not on file   Relationships    Social connections     Talks on phone: Not on file     Gets together: Not on file     Attends Jew service: Not on file     Active member of club or organization: Not on file     Attends meetings of clubs or organizations: Not on file     Relationship status: Not on file    Intimate partner violence     Fear of current or ex partner: Not on file     Emotionally abused: Not on file     Physically abused: Not on file     Forced sexual activity: Not on file   Other Topics Concern    Not on file   Social History Narrative    Not on file     Social History     Substance and Sexual Activity   Alcohol Use No     Social History     Substance and Sexual Activity   Drug Use No     Social History     Tobacco Use   Smoking Status Never Smoker   Smokeless Tobacco Never Used     Family History   Problem Relation Age of Onset    Hyperlipidemia Mother     Hypertension Mother     Arthritis Mother     Diabetes type II Mother     Diabetes type II Sister     Lung cancer Maternal Grandfather     Lung cancer Paternal Grandfather     Hyperlipidemia Father     No Known Problems Maternal Grandmother     No Known Problems Paternal Grandmother     Seizures Brother     No Known Problems Son     Diabetes type II Sister     No Known Problems Sister     No Known Problems Brother     No Known Problems Brother     No Known Problems Brother     No Known Problems Brother      ==  Mike Maguire MD   Resident, PGY-2  [facility] Internal Medicine [address]  [address]   [facility] [place] , [address] [address] [facility] [address] [facility]  Office: [phone]  Fax: [phone]

## 2021-06-04 NOTE — TELEPHONE ENCOUNTER
Aric from [address] [place] [address] called stating the Azelaic acid 15 % foam is not covered by insurance it was denied   Call back number for pharmacy is [phone]

## 2021-06-07 DIAGNOSIS — L71.9 ROSACEA: Primary | ICD-10-CM

## 2021-06-07 RX ORDER — SULFACETAMIDE SODIUM AND SULFUR 9; 4.5 MG/G; MG/G
RINSE TOPICAL
Qty: 454 G | Refills: 3 | Status: SHIPPED | OUTPATIENT
Start: 2021-06-07 | End: 2022-02-22

## 2021-06-07 NOTE — TELEPHONE ENCOUNTER
Let patient know her insurance has denied both Rhofade and finacea (our preferred meds)    - they are willing to cover a wash which I sent to her pharmacy  - she can get azelaic acid 10% over the counter   Naturium or Shannan's choice brands

## 2021-06-08 ENCOUNTER — TELEPHONE (OUTPATIENT)
Dept: FAMILY MEDICINE CLINIC | Facility: CLINIC | Age: 31
End: 2021-06-08

## 2021-06-09 NOTE — TELEPHONE ENCOUNTER
I called the patient and tried to explain to the  the situation with the denial from insurance about Rhofade and Kindra  I explained that the insurance is willing to cover a wash and that they are able to pick it up at there pharmacy  I also mentioned to the  about the two brands of azelaic acid 10% they can get at any pharmacy or store

## 2021-10-01 ENCOUNTER — OFFICE VISIT (OUTPATIENT)
Dept: DENTISTRY | Facility: CLINIC | Age: 31
End: 2021-10-01

## 2021-10-01 VITALS — DIASTOLIC BLOOD PRESSURE: 70 MMHG | TEMPERATURE: 97.7 F | SYSTOLIC BLOOD PRESSURE: 105 MMHG

## 2021-10-01 DIAGNOSIS — K02.9 CARIES: Primary | ICD-10-CM

## 2021-10-01 PROCEDURE — D2393 RESIN-BASED COMPOSITE - 3 SURFACES, POSTERIOR: HCPCS | Performed by: DENTIST

## 2021-12-23 ENCOUNTER — TELEMEDICINE (OUTPATIENT)
Dept: INTERNAL MEDICINE CLINIC | Facility: CLINIC | Age: 31
End: 2021-12-23

## 2021-12-23 VITALS — TEMPERATURE: 100.1 F

## 2021-12-23 DIAGNOSIS — M79.10 MYALGIA: ICD-10-CM

## 2021-12-23 DIAGNOSIS — R68.89 CONGESTION OF THROAT: Primary | ICD-10-CM

## 2021-12-23 DIAGNOSIS — R05.9 COUGH: ICD-10-CM

## 2021-12-23 PROCEDURE — 99213 OFFICE O/P EST LOW 20 MIN: CPT | Performed by: INTERNAL MEDICINE

## 2021-12-23 RX ORDER — IBUPROFEN 400 MG/1
400 TABLET ORAL EVERY 6 HOURS PRN
Qty: 45 TABLET | Refills: 0 | Status: SHIPPED | OUTPATIENT
Start: 2021-12-23 | End: 2022-02-22

## 2021-12-27 ENCOUNTER — TELEMEDICINE (OUTPATIENT)
Dept: INTERNAL MEDICINE CLINIC | Facility: CLINIC | Age: 31
End: 2021-12-27

## 2021-12-27 DIAGNOSIS — R05.9 COUGH: ICD-10-CM

## 2021-12-27 DIAGNOSIS — U07.1 COVID-19: Primary | ICD-10-CM

## 2021-12-27 PROCEDURE — G2012 BRIEF CHECK IN BY MD/QHP: HCPCS | Performed by: INTERNAL MEDICINE

## 2022-01-12 ENCOUNTER — OFFICE VISIT (OUTPATIENT)
Dept: DENTISTRY | Facility: CLINIC | Age: 32
End: 2022-01-12

## 2022-01-12 VITALS — TEMPERATURE: 97.8 F | SYSTOLIC BLOOD PRESSURE: 105 MMHG | DIASTOLIC BLOOD PRESSURE: 71 MMHG

## 2022-01-12 DIAGNOSIS — K02.9 CARIES: Primary | ICD-10-CM

## 2022-01-12 PROCEDURE — D2392 RESIN-BASED COMPOSITE - 2 SURFACES, POSTERIOR: HCPCS | Performed by: DENTIST

## 2022-01-12 PROCEDURE — D2393 RESIN-BASED COMPOSITE - 3 SURFACES, POSTERIOR: HCPCS | Performed by: DENTIST

## 2022-01-12 NOTE — PROGRESS NOTES
Composite Filling #19 MOD and 20 DO    Gene Smith presents for composite filling #19 MOD and 20 DO  PMH reviewed, no changes  Applied topical benzocaine, administered 1 carps 2% lido 1:100k epi via IA  Prepped tooth #19 MOD and 20 DO with 245 carbide on high speed  Caries removed with round carbide on slow speed  Noted that #18 mesial did not have caries today  Placed PALOMO matrix  Isolation with cotton rolls and dri-angles    Etch with 37% H2PO4, rinse, dry  Applied Adhese with 20 second scrub once, gentle air dry and light cured for 10s  Restored with Tetric bulk tavon shade A2 and light cured  Refined with finishing burs, polished with enhance point  Verified occlusion and contacts  Pt left satisfied  Note that contours of #19 and 20 are not perfect due to rotation of teeth and uneven occlusal plane      NV: R side resins

## 2022-01-25 ENCOUNTER — OFFICE VISIT (OUTPATIENT)
Dept: DENTISTRY | Facility: CLINIC | Age: 32
End: 2022-01-25

## 2022-01-25 VITALS — TEMPERATURE: 98.2 F

## 2022-01-25 DIAGNOSIS — K02.9 CARIES: Primary | ICD-10-CM

## 2022-01-25 PROCEDURE — D2391 RESIN-BASED COMPOSITE - 1 SURFACE, POSTERIOR: HCPCS | Performed by: DENTIST

## 2022-01-25 NOTE — PROGRESS NOTES
Composite Filling #31    Vickie Andujar presents for composite filling #31  Pt initially planned for #30 O as well but noted that existing occlusal composite has sealed margins with no recurrent decay  PMH reviewed, no changes  Applied topical benzocaine, administered 1  carps 2% lido 1:100k epi via IA  Prepped tooth #31 with 245 carbide on high speed  Caries and existing restoration removed with round carbide on slow speed  Isolation with cotton rolls and dri-angles    Etch with 37% H2PO4, rinse, dry  Applied Adhese with 20 second scrub once, gentle air dry and light cured for 10s  Restored with Tetric bulk tavon shade A2 and light cured  Refined with finishing burs, polished with enhance point  Verified occlusion and contacts  Pt left satisfied  Noted calculus on mandibular anteriors  Pt stated she has had cold sensitivity in posterior teeth  BW of #19, 20, and 3 show imperfect margin but sealed  Pain appears to be related to perio health  Recommend hygiene visit and periodic (pt is due) if symptoms continue after visit recommend Gluma application to exposed cementum      NV: Periodic and prophy

## 2022-01-28 ENCOUNTER — OFFICE VISIT (OUTPATIENT)
Dept: DENTISTRY | Facility: CLINIC | Age: 32
End: 2022-01-28

## 2022-01-28 VITALS — SYSTOLIC BLOOD PRESSURE: 115 MMHG | HEART RATE: 75 BPM | TEMPERATURE: 97.6 F | DIASTOLIC BLOOD PRESSURE: 63 MMHG

## 2022-01-28 DIAGNOSIS — Z01.20 VISIT FOR DENTAL EXAMINATION: Primary | ICD-10-CM

## 2022-01-28 PROCEDURE — D0274 BITEWINGS - 4 RADIOGRAPHIC IMAGES: HCPCS

## 2022-01-28 PROCEDURE — D0120 PERIODIC ORAL EVALUATION - ESTABLISHED PATIENT: HCPCS | Performed by: DENTIST

## 2022-01-28 NOTE — PROGRESS NOTES
Periodic Exam/4BWS/ Perio  charting    Scott Driscoll presents for periodic exam  Reviewed PMH, no changes  Tego translation services used for [place]-   ID # [id number]    Completed oral cancer screening, no abnormal findings  Obtained 4 BWs and reviewed findings  Completed restorative exam and perio spot probing  Perio charting reveals- 4-5mm pockets in posterior teeth with generalized bleeding and sensitivity to probing  Dr Dorado Labor exam-  -Decay noted #1-occ, 2-occ, 15-occ, 16-occ, 17-occ  -Incipient lesion #32-occ  -Watch placed #18-occ  -Pt was asked if she is interested in having wisdom teeth extracted or have fillings done instead as these teeth #1, 17, 32 are developing cavities  Pt would like to wait and watch them every visit until symptoms arise      -Pt was asked if she is having symptoms on #14- pt reports she does not have pain to touch or temperature  Ideal treatment is a crown and post/core due to large amalgam filling and recurrent decay on mesial  Pt states she would like to wait until the tooth is worn and becomes symptomatic before doing treatment  Pt asked if we can remove the filling and put a new filling, Dr Sheron Das explained this is not recommended due to the size of the existing filling- it could even become symptomatic  At conclusion of conversation, pt would like to continue with crown on #14   -Pt was recommended for scaling and root planing in every quadrant of mouth with anesthesia  Will submit to insurance first    -Fillings #2, 15 pt would like to have completed, decay on third molars she would like to wait and watch     -Pt has several questions throughout procedure- please allow for extra time when scheduling  Nasima Singleton RDH    NV: SRP UR, LR- 90 mins  NV2: SRP UL, LL- 90 mins  NV3: fillings #2, 15  NV4: start crown #14

## 2022-02-22 ENCOUNTER — OFFICE VISIT (OUTPATIENT)
Dept: INTERNAL MEDICINE CLINIC | Facility: CLINIC | Age: 32
End: 2022-02-22

## 2022-02-22 VITALS
SYSTOLIC BLOOD PRESSURE: 96 MMHG | TEMPERATURE: 98.1 F | HEART RATE: 90 BPM | WEIGHT: 150.6 LBS | BODY MASS INDEX: 25.06 KG/M2 | DIASTOLIC BLOOD PRESSURE: 62 MMHG

## 2022-02-22 DIAGNOSIS — J32.9 RHINOSINUSITIS: Primary | ICD-10-CM

## 2022-02-22 DIAGNOSIS — J31.0 RHINOSINUSITIS: ICD-10-CM

## 2022-02-22 DIAGNOSIS — J34.89 SINUS PRESSURE: Primary | ICD-10-CM

## 2022-02-22 DIAGNOSIS — J32.9 RHINOSINUSITIS: ICD-10-CM

## 2022-02-22 DIAGNOSIS — J31.0 RHINOSINUSITIS: Primary | ICD-10-CM

## 2022-02-22 DIAGNOSIS — J34.89 SINUS PRESSURE: ICD-10-CM

## 2022-02-22 PROCEDURE — 99213 OFFICE O/P EST LOW 20 MIN: CPT | Performed by: INTERNAL MEDICINE

## 2022-02-22 RX ORDER — CETIRIZINE HYDROCHLORIDE 10 MG/1
10 TABLET, CHEWABLE ORAL DAILY
Qty: 14 TABLET | Refills: 0 | Status: SHIPPED | OUTPATIENT
Start: 2022-02-22 | End: 2022-02-22

## 2022-02-22 RX ORDER — FLUTICASONE PROPIONATE 50 MCG
2 SPRAY, SUSPENSION (ML) NASAL 4 TIMES DAILY PRN
Qty: 9.9 ML | Refills: 0 | Status: SHIPPED | OUTPATIENT
Start: 2022-02-22 | End: 2022-03-22

## 2022-02-22 RX ORDER — CETIRIZINE HYDROCHLORIDE 5 MG/1
5 TABLET, CHEWABLE ORAL DAILY
Qty: 14 TABLET | Refills: 0 | Status: CANCELLED | OUTPATIENT
Start: 2022-02-22 | End: 2022-03-08

## 2022-02-22 RX ORDER — CETIRIZINE HYDROCHLORIDE 10 MG/1
10 TABLET, CHEWABLE ORAL DAILY
Qty: 14 TABLET | Refills: 0 | OUTPATIENT
Start: 2022-02-22 | End: 2022-03-08

## 2022-02-22 NOTE — PATIENT INSTRUCTIONS
Rinosinüzit   AYAKTA TEDAV?:   Rinosinüzit (RS) burun pasajlar? ve sinüslerin enflamasyonu veya enfeksiyonudur  RS'ye ço? unlukla bir virüs neden olur, ancak bakterilerden de [place]  Akut RS 12 haftaya kadar sürer  Kronik RS 12 haftadan daha uzun sürer  Rekürren RS 1 y?lda 4 veya daha fazla enfeksiyon geçirmeniz anlam?na gelir  Yayg?n bulgu ve belirtileri ?unlard?r:  · Ate? · Al?n, yanak veya göz çevresinde a?r?, bas?nç, k?earl?kl?k veya ? i?lik    · Burnunuzdan nikhil? Naaman Cart ye?il renkte k?vaml? ak?nt? · Quillian Goo tat kayb? · Genelde geceleri veya yatt???n?zda meydana gelen kuru öksürük    · Öne e?ildi?inizde daha kötüle?en ba? a?r?s? ve yüz a?r?s?    · Di? a?r?s? veya çi?nerken a?r?    ?u durumlarda derhal yard?m isteyin:  · Nefes almakta güçlük çekiyorsunuz veya h?r?lt?lar?n?z gittikçe kötüle? iyor  · Boynunuz tutuldu, ate?iniz rodger veya ba??n?z çok a?r?yor  · Gözlerinizi açam?yorsunuz  · Gözleriniz ? i?mi? veya gözünüzü hareket ettiremiyorsunuz  · Normalden daha [address] dü?ünme, hareket etme ya da konu? ma becerinizde de? i?iklikler fark ettiniz  · Aln?n?zda veya kafa derinizde ? i?lik rodger     A?a??daki durumlarda doktorunuzu aray?n:  · Belirtileriniz kötüle? iyor ya da 3 man 5 günlük tedavinin gabriel?ndan iyile?me göstermiyor  · Nohemi Gen milan?m?n?z hakk?nda soru ya da endi?eleriniz rodger  Tedavi gerekli [employer]  Semptomlar?n?z kendili?inden geçebilir  Sa?l?k uzman?n?z, antibiyotiklere ba?lamadan önce 10 güne kadar aktif izlem önerebilir  Enfeksiyona bir virüs neden olmu?sa antibiyotikler yard?mc? olmayacakt?r  Reçetesiz herhangi bir ba?ka ilaç almadan önce sa?l?k uzman?n?za ba?vurun  A?a??dakilerden herhangi birine ihtiyac?n?z olabilir:  · Asetaminofen a?r?y? azalt?r ve ate?i dü?ürür  Reçetesiz olarak temin edilebilir  ?lac? ne kadar ve ne s?kl?kla katia?z gerekti?ini ayo uMrillo   Ayr?ca, asetaminofen içerip içermediklerini willard aguilera di?er tüm ilaçlar? n etiketlerini [address] doktor Equilla Christy? n?za rose marie???n  Asetaminofen do?ru ?ekilde al?nmazsa karaci?ere zarar verebilir  Günde toplam 4 gramdan (4000 miligram) fazla asetaminofen kullanmay?n     · NSAID'ler , örne?in ibuprofen; ? i?meyi, a?r?y? azalt?r ve ate?i dü?ürmeye yard?mc? olur  Bu ilaç, doktor tavsiyesi olsa da olmasa da al?nabilir  NSAID'ler, belirli ki?ilerde mide kanamas?na ya da böbrek rahats?zl?klar?na neden olabilir  Sulma suland?r?c? ilaç kullan?yorsan?z, NSAID'lerin sizin için güvenli olup olmad???n? sa?l?k görevlinize mutlaka  sorun  ?laç etiketini kesinlikle okuyun ve üzerinde belirtilen talimatlar? uygulay?n     · Geniz steroid spreyleri burnunuzda ve sinüslerinizdeki iltihaplanmay? azaltmaya yard?mc? olur  · Burun t?sulma?kl? ?? gidericiler burun ve sinüslerdeki ? i?meyi azaltmaya ve balgam atmaya yard?mc? olur  Daha kolay nefes alman?za yard?mc? olabilir  Burun t?sulma?kl? ?? gidericileri 3 günden fazla kullanmay?n  · Antihistaminikler burundaki mukusun kurumas?na ve hap??rmay? iyile?tirmeye yard?mc? olur  · Antibiyotikler bakteriyel enfeksiyonu tedavi etmeye veya önlemeye yard?mc? olur  Alois Furl kendine milan?m:  · Sinüslerinizi belirtildi?i ?ekilde y?akshat?n  Geniz yolunuzu tuzlu peterson solüsyonu veya saf suyla y?kamak için bir sinüs y?vito cihaz? kullan?n  Musluk suyu kullanmay?n  Sinüsün y?kanmas?, burnunuzdaki mukusu inceltip polen ve kirleri temizlemeye yard?mc? olur  [place]?ca, ? i?meyi azaltarak normal ?ekilde nefes alman?za yard?mc? olur  · Hava nemlendirici kullan?n evinizdeki hava nemini art?r?n  Nemli hava, nefes alman?z? kolayla? t?rabilir ve [facility] yard?mc? olabilir  · Uyurken ba??n?z? yüksekte tutun  Sinüslerinizin irene?luis?na yard?mc? olmak için yatmadan önce ba??n?z?n alt?na fazladan yast?k yerle?tirin  · Belirtilen ?ekilde s?v? tüketin  Her gün pepito cosme s?v? miktar? n? ve sizin için en uygun mireille s?v?lar? sa?l?k görevlinize dan???n  S?v?lar burnunuzdaki mukusun incelerek irene?luis?na yard?mc? olur  Alkol veya kafein içeren [employer]  · Sigara içmeyin ve pasif içicilikten kaç?n?n  Sigara ve purolardaki nikotin ve di?er kimyasallar belirtilerinizi kötüle?tirebilir  Sacha Render ve sigaray? b?rakma konusunda yard?ma ihtiyac?n?z varsa sa?l?k görevlinizden bilgi isteyin  [employer]tron[employer] sigara veya dumans? z tütün de nikotin içerir  Bu ürünleri kullanmadan önce sa?l?k görevlinizle konu?un  Mikroplar? n yay?lmas?n? engelleme:  · Ellerinizi s?k s?k sabun ve peterson ile y?akshat?n  Banyoyu kulland?ktan, çocuk bezi de? i?tirdikten veya hap??rd?ktan sonra ellerinizi y?akshat?n  Yemek haz?rlamadan önce ellerinizi y?akshat?n          · Hasta insanlardan uzak durun  Baz? mikroplar temas yoluyla kolay ve h?zl? bir ?ekilde yay?l?r  Doktorunuzun talimatlar?lindsey elkins: Bir kwabena, rom, irene?az uzman?na sevk edilebilirsiniz  Akl?n?za gelen sorular? ziyaretlerinizde hat?rlay?p sorabilmek için bir yere not al?n  © Copyright [employer] 2021 Information is for End User's use only and may not be sold, redistributed or otherwise used for commercial purposes  All illustrations and images included in CareNotes® are the copyrighted property of A D A M , Inc  or Jolly De Los Santos   The above information is an  only  It is not intended as medical advice for individual conditions or treatments  Talk to your doctor, nurse or pharmacist before following any medical regimen to see if it is safe and effective for you

## 2022-02-22 NOTE — PROGRESS NOTES
[facility]  INTERNAL MEDICINE OFFICE VISIT     PATIENT INFORMATION     You Harrell   33 y o  female   MRN: [id number]    ASSESSMENT/PLAN     Diagnoses and all orders for this visit:    Sinus pressure  Rhinosinusitis  Patient presents with about 3 weeks of chronic sinus tenderness and pressure  Endorses clear rhinorrhea  Denies any fever, chills, congestion  No nasal or pharyngeal erythema noted on exam   No lymphadenopathy  Likely seasonal allergies versus viral rhinosinusitis  Less concern for bacterial infection at this time  Patient is also currently breastfeeding  Will trial Flonase and Zyrtec  If patient does not have improvement in symptoms within 3-5 days or develops new onset fever for concerns of bacterial infection, advised patient to follow-up with clinic  -     fluticasone (FLONASE) 50 mcg/act nasal spray; 2 sprays into each nostril 4 (four) times a day as needed for rhinitis or allergies  -     cetirizine (ZyrTEC) 10 MG chewable tablet; Chew 1 tablet (10 mg total) daily for 14 days      Health Maintenance: Will defer until annual visit with PCP        Schedule a follow-up appointment for annual physical with PCP    Immunization History   Administered Date(s) Administered    COVID-19 PFIZER VACCINE 0 3 ML IM 04/22/2021, 05/14/2021    DTaP 01/12/2017    Tdap 06/12/2017, 04/15/2020       CHIEF COMPLAINT     Chief Complaint   Patient presents with    Tingling     facial tingling for about 20 days    Nasal Congestion      HISTORY OF PRESENT ILLNESS     You Harrell is a 33 y o  female with no significant past medical history who presents with 3 weeks of sinus tenderness and pressure  Endorses clear rhinorrhea  Denies any fever, chills, congestion  Has not taken any medication for it  Is COVID vaccinated x2  No on else sick at home  No nasal or pharyngeal erythema noted on exam   No lymphadenopathy  No other acute complaints at this time       The following portions of the patient's history were reviewed and updated as appropriate: allergies, current medications, past family history, past medical history, past social history, past surgical history and problem list        REVIEW OF SYSTEMS     Review of Systems   Constitutional: Negative for chills, fatigue and fever  HENT: Positive for postnasal drip, rhinorrhea, sinus pressure, sinus pain and sneezing  Negative for ear pain, sore throat, tinnitus and trouble swallowing  Eyes: Negative for pain and visual disturbance  Respiratory: Negative for cough, chest tightness and shortness of breath  Cardiovascular: Negative for chest pain, palpitations and leg swelling  Gastrointestinal: Negative for abdominal distention, abdominal pain, constipation, diarrhea, nausea and vomiting  Genitourinary: Negative for dysuria, frequency, hematuria and urgency  Musculoskeletal: Negative for arthralgias, back pain, gait problem and myalgias  Skin: Negative for color change and rash  Neurological: Positive for headaches  Negative for dizziness, seizures, syncope and light-headedness  All other systems reviewed and are negative  OBJECTIVE     Vitals:    02/22/22 1119   BP: 96/62   BP Location: Left arm   Patient Position: Sitting   Cuff Size: Standard   Pulse: 90   Temp: 98 1 °F (36 7 °C)   TempSrc: Temporal   Weight: 68 3 kg (150 lb 9 6 oz)     Physical Exam  Vitals and nursing note reviewed  Constitutional:       General: She is not in acute distress  Appearance: She is well-developed  HENT:      Head: Normocephalic and atraumatic  Right Ear: Ear canal normal  There is no impacted cerumen  Left Ear: Ear canal normal       Nose: Rhinorrhea present  Mouth/Throat:      Mouth: Mucous membranes are moist       Pharynx: Oropharynx is clear  No oropharyngeal exudate or posterior oropharyngeal erythema     Eyes:      Conjunctiva/sclera: Conjunctivae normal       Pupils: Pupils are equal, round, and reactive to light  Cardiovascular:      Rate and Rhythm: Normal rate and regular rhythm  Heart sounds: No murmur heard  Pulmonary:      Effort: Pulmonary effort is normal  No respiratory distress  Breath sounds: Normal breath sounds  Abdominal:      Palpations: Abdomen is soft  Tenderness: There is no abdominal tenderness  Musculoskeletal:      Cervical back: Neck supple  Right lower leg: No edema  Left lower leg: No edema  Skin:     General: Skin is warm and dry  Neurological:      Mental Status: She is alert and oriented to person, place, and time  CURRENT MEDICATIONS     Current Outpatient Medications:     Calcium Carbonate-Vitamin D (CALCIUM 500/D PO), Take by mouth, Disp: , Rfl:     Multiple Vitamin (MULTIVITAMIN) capsule, Take 1 capsule by mouth daily, Disp: , Rfl:     Omega-3 Fatty Acids (FISH OIL BURP-LESS PO), Take by mouth, Disp: , Rfl:     cetirizine (ZyrTEC) 10 MG chewable tablet, Chew 1 tablet (10 mg total) daily for 14 days, Disp: 14 tablet, Rfl: 0    fluticasone (FLONASE) 50 mcg/act nasal spray, 2 sprays into each nostril 4 (four) times a day as needed for rhinitis or allergies, Disp: 9 9 mL, Rfl: 0    PAST MEDICAL & SURGICAL HISTORY     Past Medical History:   Diagnosis Date    Maternal excessive weight gain     57 lbs    Pruritic urticarial papules and plaques of pregnancy (puppp) 2017    Varicella w/o complication childhood    unsure    Wears glasses      Past Surgical History:   Procedure Laterality Date    [place]  DELIV[place] ONLY N/A 2017    Procedure:  SECTION ();   Surgeon: Elias Quevedo MD;  Location[place] ;  Service: Obstetrics    [place]  DELIVERY ONLY N/A 2020    Procedure:  SECTION () REPEAT;  Surgeon: Dread Ying MD;  Location: [facility];  Service: Obstetrics    WISDOM TOOTH EXTRACTION       SOCIAL & FAMILY HISTORY     Social History     Socioeconomic History    Marital status: /Civil Union     Spouse name: Matty Castillo Number of children: 2    Years of education: Not on file    Highest education level: Not on file   Occupational History    Not on file   Tobacco Use    Smoking status: Never Smoker    Smokeless tobacco: Never Used   Vaping Use    Vaping Use: Never used   Substance and Sexual Activity    Alcohol use: No    Drug use: No    Sexual activity: Yes     Partners: Male     Birth control/protection: None   Other Topics Concern    Not on file   Social History Narrative    Who lives in your home: Spouse and children     What type of home do you live in: Single house    Age of your home: Bit 30 yrs ago     How long have you been living there: 2019    Type of heat: Baseboard    Type of fuel: Gas    What type of kevin is in your bedroom: Carpet    Do you have the following in or near your home:    Air products: Central air    Pests: None    Pets: None    Are pets allowed in bedroom: N/A    Open fields, wooded areas nearby: N/A    Basement: Wet, Dry, Damp, Musty and Unfinished    Exposure to second hand smoke: No        Habits:    Caffeine: coffee 1 cup daily-hot tea 1 cup daily     Chocolate: daily     Other:     Social Determinants of Health     Financial Resource Strain: Low Risk     Difficulty of Paying Living Expenses: Not hard at all   Food Insecurity: No Food Insecurity    Worried About Running Out of Food in the Last Year: Never true    Sung of Food in the Last Year: Never true   Transportation Needs: No Transportation Needs    Lack of Transportation (Medical): No    Lack of Transportation (Non-Medical): No   Physical Activity: Inactive    Days of Exercise per Week: 0 days    Minutes of Exercise per Session: 0 min   Stress: No Stress Concern Present    Feeling of Stress : Not at all   Social Connections:  Moderately Isolated    Frequency of Communication with Friends and Family: More than three times a week    Frequency of Social Gatherings with Friends and Family: More than three times a week    Attends Evangelical Services: Never    Active Member of Clubs or Organizations: No    Attends Club or Organization Meetings: Never    Marital Status:    Intimate Partner Violence: Not At Risk    Fear of Current or Ex-Partner: No    Emotionally Abused: No    Physically Abused: No    Sexually Abused: No   Housing Stability: Low Risk     Unable to Pay for Housing in the Last Year: No    Number of Places Lived in the Last Year: 1    Unstable Housing in the Last Year: No     Social History     Substance and Sexual Activity   Alcohol Use No     Social History     Substance and Sexual Activity   Drug Use No     Social History     Tobacco Use   Smoking Status Never Smoker   Smokeless Tobacco Never Used     Family History   Problem Relation Age of Onset    Hyperlipidemia Mother     Hypertension Mother     Arthritis Mother     Diabetes type II Mother     Hyperlipidemia Father     Diabetes type II Sister     Diabetes type II Sister     No Known Problems Sister     Seizures Brother     No Known Problems Brother     No Known Problems Brother     No Known Problems Brother     No Known Problems Brother     No Known Problems Maternal Grandmother     Lung cancer Maternal Grandfather     No Known Problems Paternal Grandmother     Lung cancer Paternal Grandfather     No Known Problems Son     No Known Problems Son        Global Time for Encounter: 15 minutes  ==  Vivek Acosta MS, DO  PGY-1, [address] [address]   [facility], [facility] SPECIALTY [facility] [place] , [address] [address] [facility] [address], [address] [facility]  Office: [phone]  Fax: [phone]

## 2022-03-22 ENCOUNTER — OFFICE VISIT (OUTPATIENT)
Dept: DENTISTRY | Facility: CLINIC | Age: 32
End: 2022-03-22

## 2022-03-22 VITALS — TEMPERATURE: 97.8 F | SYSTOLIC BLOOD PRESSURE: 115 MMHG | DIASTOLIC BLOOD PRESSURE: 75 MMHG | HEART RATE: 100 BPM

## 2022-03-22 DIAGNOSIS — J32.9 RHINOSINUSITIS: ICD-10-CM

## 2022-03-22 DIAGNOSIS — J34.89 SINUS PRESSURE: ICD-10-CM

## 2022-03-22 DIAGNOSIS — K05.6 PERIODONTAL DISEASE: Primary | ICD-10-CM

## 2022-03-22 DIAGNOSIS — J31.0 RHINOSINUSITIS: ICD-10-CM

## 2022-03-22 PROCEDURE — D4341 PERIODONTAL SCALING AND ROOT PLANING - 4 OR MORE TEETH PER QUADRANT: HCPCS

## 2022-03-22 RX ORDER — FLUTICASONE PROPIONATE 50 MCG
2 SPRAY, SUSPENSION (ML) NASAL 4 TIMES DAILY PRN
Qty: 16 ML | Refills: 0 | Status: SHIPPED | OUTPATIENT
Start: 2022-03-22

## 2022-03-22 NOTE — PROGRESS NOTES
SRP LR    Translation phones used initially to go over treatment and Google translate was used throughout appt  to communicate w/ pt  Pt presented to start SRP for UR/LR  Pt extremely nervous, only 1 Quad was completed today, LR  Pt jumpy through procedure  Had to take small breaks  Topical Benzo applied prior to injection  R IANB, long needle, 1 carp Lido 2% w/ epi, neg aspir  Cav and handscale: gen subcalc and gen supra staining  Gen mod milagro  Irrigated w/  Chx     OHI: stressed nightly flossing  Pt states she only does 3/week currently  Also rec taking an OTC pain reliever as soon as she gets home to alleviate any pain  Pt states she will take Tylenol  Pt states she would like to complete UL/LL in one visit for tomorrow's appt  She will need to make another appt to complete UR   Pt understands and agrees    1)UL/LL SRP  2)UR SRP  3)6 wk re-eval

## 2022-03-23 ENCOUNTER — OFFICE VISIT (OUTPATIENT)
Dept: DENTISTRY | Facility: CLINIC | Age: 32
End: 2022-03-23

## 2022-03-23 VITALS — TEMPERATURE: 97.8 F

## 2022-03-23 DIAGNOSIS — K05.6 PERIODONTAL DISEASE: Primary | ICD-10-CM

## 2022-03-23 PROCEDURE — D4341 PERIODONTAL SCALING AND ROOT PLANING - 4 OR MORE TEETH PER QUADRANT: HCPCS

## 2022-03-23 NOTE — PROGRESS NOTES
SRP UL/LL    Google translate used to communicate    ASA I Reviewed meds/hhx    Topical Benzo applied prior to injection  L IANB, long needle, 1 carp Lido w/epi, neg aspir  2 Max B infiltrations, short needle, 1 3/4 carp Lido w/ epi, neg apsir  Cav and hand scale: gen calc and stain      1)UR SRP   2)6 wk perio re-eval

## 2022-03-24 ENCOUNTER — OFFICE VISIT (OUTPATIENT)
Dept: DENTISTRY | Facility: CLINIC | Age: 32
End: 2022-03-24

## 2022-03-24 VITALS — SYSTOLIC BLOOD PRESSURE: 109 MMHG | HEART RATE: 96 BPM | DIASTOLIC BLOOD PRESSURE: 73 MMHG | TEMPERATURE: 98.7 F

## 2022-03-24 DIAGNOSIS — K05.6 PERIODONTAL DISEASE: Primary | ICD-10-CM

## 2022-03-24 PROCEDURE — D4341 PERIODONTAL SCALING AND ROOT PLANING - 4 OR MORE TEETH PER QUADRANT: HCPCS

## 2022-03-24 RX ORDER — CETIRIZINE HYDROCHLORIDE 10 MG/1
TABLET ORAL
COMMUNITY
Start: 2022-02-22

## 2022-03-24 NOTE — PROGRESS NOTES
SRP UR    Reviewed meds/hhx  ASA I    Google translate used to communicate    Topical Benzo applied prior to injection  2 Max B infiltrations, short needle, 1 carp Lido w/ epi, neg aspir  cav and hand scale: subcalc/gen hvy stain/gen milagro  Irrigated w/ Peridex      Perio: mod gen  perio    1)2 and 15 resins  2)6 week perio re-eval

## 2022-05-27 ENCOUNTER — OFFICE VISIT (OUTPATIENT)
Dept: DENTISTRY | Facility: CLINIC | Age: 32
End: 2022-05-27

## 2022-05-27 VITALS — TEMPERATURE: 97.7 F | DIASTOLIC BLOOD PRESSURE: 74 MMHG | HEART RATE: 96 BPM | SYSTOLIC BLOOD PRESSURE: 109 MMHG

## 2022-05-27 DIAGNOSIS — K02.9 CARIES: Primary | ICD-10-CM

## 2022-05-27 PROCEDURE — D2393 RESIN-BASED COMPOSITE - 3 SURFACES, POSTERIOR: HCPCS | Performed by: STUDENT IN AN ORGANIZED HEALTH CARE EDUCATION/TRAINING PROGRAM

## 2022-05-27 NOTE — PROGRESS NOTES
Composite Filling    [facility] presents for composite filling #2 MO  PMH reviewed, no changes  [place] translation was needed and google translate was used  Discussed with patient need for RCT if pulp exposure occurs or in future if pulp is inflamed  Pt understands and consents  Applied topical benzocaine, administered 1 5 carps  4% articaine 1:100k epi via local infiltration  Prepped tooth #2 with 245 carbide on high speed  Caries removed with round carbide on slow speed  Isolation with cotton rolls and dri-angles and hartman ring and wedge used  Etch with 37% H2PO4, rinse, dry  Applied Adhese with 20 second scrub once, gentle air dry and light cured for 10s  Restored with Tetric bulk tavon shade A2 and light cured  Refined with finishing burs, polished with enhance point  Verified occlusion and contacts  Pt left satisfied  NV: #15 resin - 90 mins or 2 hours preferred pt asks lots of questions and needs several accommodations + [place] translation any resident  Pt did not tolerate use of any bite block or dry-shield which made isolation more difficult

## 2022-05-31 ENCOUNTER — OFFICE VISIT (OUTPATIENT)
Dept: DENTISTRY | Facility: CLINIC | Age: 32
End: 2022-05-31

## 2022-05-31 VITALS — TEMPERATURE: 98 F

## 2022-05-31 DIAGNOSIS — Z01.20 ENCOUNTER FOR DENTAL EXAMINATION: Primary | ICD-10-CM

## 2022-05-31 PROCEDURE — D0171 RE-EVALUATION - POST-OPERATIVE OFFICE VISIT: HCPCS

## 2022-05-31 PROCEDURE — D0220 INTRAORAL - PERIAPICAL FIRST RADIOGRAPHIC IMAGE: HCPCS

## 2022-05-31 NOTE — PROGRESS NOTES
PERIO RE-EVAL AFTER SRP    Pt speaks [place], used Google translate throughout appointment    CC: Pain to biting hot and cold on filling that was just done last week  Patient pointed to #2  Reviewed meds/hhx in Epic  ASA II    FM Probe reveals gen decrease in probe depths, less bleeding, overall pink tissue  Mod plq around #1 and 2, hard to reach  PA #2    Dr Rome Police area of #2  Pt not reactive to cold air and palpation/percussion  Occlusion checked w/ articulating paper, occlusion fine  Discussed w/ patient that #2 has a very large filling, if symptoms do not stop it will need a root canal  We will continue to monitor  Pt understands  Rec using Sensodyne toothpaste to see if that helps  Pt needs to return for filling #15-O  Also discussed having all 4 third molars extracted  Pt understands      1)#15 O restoration w/ any female resident  2)3Mos PerioMt, PeriodicX in hygiene 60 mins

## 2022-06-01 ENCOUNTER — OFFICE VISIT (OUTPATIENT)
Dept: DENTISTRY | Facility: CLINIC | Age: 32
End: 2022-06-01

## 2022-06-01 VITALS — SYSTOLIC BLOOD PRESSURE: 108 MMHG | DIASTOLIC BLOOD PRESSURE: 67 MMHG | TEMPERATURE: 98.1 F | HEART RATE: 76 BPM

## 2022-06-01 DIAGNOSIS — K02.9 CARIES: Primary | ICD-10-CM

## 2022-06-01 PROCEDURE — D2940 PROTECTIVE RESTORATION: HCPCS | Performed by: DENTIST

## 2022-06-01 NOTE — PROGRESS NOTES
32 y/o F patient presented for #15-O restoration  The patient speaks [place] - attempted to use language line twice but patient refused language line service (she hung up the phone while the  was speaking)  Using [employer] translate on her phone, she stated that the interpreters don't speak [place] well  The appointment proceeded with translation using the patient's phone  The patient also requests to only be seen with female providers/assistants  PMH reviewed with no changes noted  Anesthetized patient using 1 carpule of 2% lidocaine with 1:100,000 epi via local infiltration  Opened prep using high speed 245 bur  Began to remove caries with slow speed round bur when bur hit cotton roll in patient's mouth  The patient got scared and immediately sat up covering her mouth with her hands  It took some time to convince the patient to sit back in the chair for us to visualize her mouth - no soft or hard tissue trauma occurred  The patient also stated that she was not in pain but as a precaution before proceeding, gave the patient 1 carpule of 4% septocaine with 1:100,000 epi via local infiltration  Dr Rafael Colon was able to continue to excavate decay (very deep decay), place limelight on the pulpal floor of the prep, and fill the prep with IRM  Informed patient that we would like to continue to monitor this tooth for symptoms - should she develop symptoms a root canal would be warranted  The patient reported understanding of our recommendations  The patient is also treatment planned for extraction of her 3rd molars but she does not wish to obtain this treatment  Please continue to monitor these teeth and treat caries as needed (take panoramic x-ray if needed)  NV: Take PA and BW of #15 prior to final treatment (begin RCT or replace restoration with resin composite)

## 2022-06-07 ENCOUNTER — OFFICE VISIT (OUTPATIENT)
Dept: DENTISTRY | Facility: CLINIC | Age: 32
End: 2022-06-07

## 2022-06-07 VITALS — TEMPERATURE: 97.6 F | DIASTOLIC BLOOD PRESSURE: 75 MMHG | HEART RATE: 93 BPM | SYSTOLIC BLOOD PRESSURE: 114 MMHG

## 2022-06-07 DIAGNOSIS — K02.9 CARIES: Primary | ICD-10-CM

## 2022-06-07 PROCEDURE — D2391 RESIN-BASED COMPOSITE - 1 SURFACE, POSTERIOR: HCPCS | Performed by: DENTIST

## 2022-06-07 NOTE — PROGRESS NOTES
Vahe Maldonado presents for treatment  The patient speaks [place] but the patient refused the language line  Interpretation accomplished using patient's phone  PMH reviewed, no changes noted - Pt is ASA II  The patient has moderate dental anxiety and requires frequent breaks and reassurance  Pain scale: 0/10    Took a new BW and PA of tooth #15 (sedative filling placed 6/1/22)  Patient reports being asymptomatic  Determined that this tooth can be restored with a permanent resin composite restoration now but given the size of the restoration, this tooth should be planned to obtain a crown  Also, tooth #14 needs a crown as well  Given the size of the existing restoration with secondary decay, it is likely that an RCT will be needed for #14  Applied topical benzocaine, administered 1 carpule of 4% septocaine with 1:100,000 epi via local infiltration  Isolation with cotton rolls  Removed existing IRM except for pulpal floor of prep  Placed glass ionomer on top  Etched with 37% H2PO4, scrubbed with Adhese, and restored with flowable and bulk tavon shade A2  Refined restoration  Verified occlusion  Patient reported satisfaction with restoration  The patient states that tooth #2 is sensitive to hot foods and upon chewing gum  Tooth #2 percussion positive  Radiograph shows deep filling but no pathology noted  If symptoms get worse, patient was advised to return for care       NV: Caries control #14 - determine if resin composite can be used prior to crown or if RCT/post/core/crown is warranted

## 2022-07-12 ENCOUNTER — OFFICE VISIT (OUTPATIENT)
Dept: DENTISTRY | Facility: CLINIC | Age: 32
End: 2022-07-12

## 2022-07-12 VITALS — DIASTOLIC BLOOD PRESSURE: 75 MMHG | HEART RATE: 74 BPM | SYSTOLIC BLOOD PRESSURE: 106 MMHG | TEMPERATURE: 97.8 F

## 2022-07-12 DIAGNOSIS — K02.9 DENTAL DECAY: Primary | ICD-10-CM

## 2022-07-12 PROCEDURE — D0140 LIMITED ORAL EVALUATION - PROBLEM FOCUSED: HCPCS | Performed by: DENTIST

## 2022-07-12 NOTE — PROGRESS NOTES
Office visit     Anny Ledesma presents for composite filling  PMH reviewed, no changes  ASA: II     Pain level: UL left side has bee hurting since they placed restoration on tooth 15  Pt is sensitive to cold and hot fluids  CC" My back tooth on UL started to hurt after they put medication in it last time"    After clinical and radiographic examination of teeth in UL, pt has restoration on #15 (that was done 2 visits here at [facility]) and has amalgam restoration on #14 with recurrent decay  Pt also has #16 present  Pt does not speak English and required , unfortunately the  did not speak proper [place], thus the communication with the pt was very difficult and left the patient confused  Dr Nada Bloch saw her and advised to have #14 and #15 re-evaluated by endo-specialist as well as schedule an appt with oral surgeon for ext of her 8,09,68,50  Endo referral was sent out  Discussed with patient need for RCT if pulp exposure occurs or in future if pulp is inflamed  Pt understands and consents  Pt was referred to Endo specialist for evaluation of #14 and #15  AFTER endo evaluation, pt can be scheduled for an appt for restoration       NV: after endo evaluation/treatment, continue with restorative phase

## 2022-10-12 ENCOUNTER — OFFICE VISIT (OUTPATIENT)
Dept: URGENT CARE | Age: 32
End: 2022-10-12
Payer: COMMERCIAL

## 2022-10-12 VITALS
OXYGEN SATURATION: 98 % | RESPIRATION RATE: 16 BRPM | DIASTOLIC BLOOD PRESSURE: 61 MMHG | TEMPERATURE: 98 F | HEART RATE: 70 BPM | SYSTOLIC BLOOD PRESSURE: 122 MMHG

## 2022-10-12 DIAGNOSIS — J01.10 ACUTE NON-RECURRENT FRONTAL SINUSITIS: ICD-10-CM

## 2022-10-12 DIAGNOSIS — H65.93 BILATERAL NON-SUPPURATIVE OTITIS MEDIA: Primary | ICD-10-CM

## 2022-10-12 PROCEDURE — 99213 OFFICE O/P EST LOW 20 MIN: CPT

## 2022-10-12 RX ORDER — AMOXICILLIN 500 MG/1
500 CAPSULE ORAL EVERY 12 HOURS SCHEDULED
Qty: 20 CAPSULE | Refills: 0 | Status: SHIPPED | OUTPATIENT
Start: 2022-10-12 | End: 2022-10-22

## 2022-10-12 NOTE — PROGRESS NOTES
[facility]        NAME: Jj Wilde is a 33 y o  female  : 1990    MRN: [id number]  DATE: 2022  TIME: 11:03 AM    Assessment and Plan   Bilateral non-suppurative otitis media [H65 93]  1  Bilateral non-suppurative otitis media  amoxicillin (AMOXIL) 500 mg capsule   2  Acute non-recurrent frontal sinusitis  amoxicillin (AMOXIL) 500 mg capsule    Clinical presentation consistent with bilateral  otitis media, will initiate antibiotic treatment  Patient advised to continue over-the-counter Tylenol/NSAIDs, and decongestants/modifier as needed for symptom control  Follow-up with primary care provider if symptoms do not improve within 1-2 weeks  Patient Instructions   Ear Infection   WHAT YOU NEED TO KNOW:   An ear infection is also called otitis media  Blocked or swollen eustachian tubes can cause an infection  Eustachian tubes connect the middle ear to the back of the nose and throat  They drain fluid from the middle ear  You may have a buildup of fluid in your ear  Germs build up in the fluid and infection develops         DISCHARGE INSTRUCTIONS:   Return to the emergency department if:   · You have clear fluid coming from your ear      · You have a stiff neck, headache, and a fever      Call your doctor if:   · You see blood or pus draining from your ear      · Your ear pain gets worse or does not go away, even after treatment      · The outside of your ear is red or swollen      · You are vomiting or have diarrhea      · You have questions or concerns about your condition or care      Medicines: You may  need any of the following:  · Acetaminophen  decreases pain and fever  It is available without a doctor's order  Ask how much to take and how often to take it  Follow directions  Read the labels of all other medicines you are using to see if they also contain acetaminophen, or ask your doctor or pharmacist  Acetaminophen can cause liver damage if not taken correctly   Do not use more than 4 grams (4,000 milligrams) total of acetaminophen in one day       · NSAIDs , such as ibuprofen, help decrease swelling, pain, and fever  This medicine is available with or without a doctor's order  NSAIDs can cause stomach bleeding or kidney problems in certain people  If you take blood thinner medicine, always ask your healthcare provider if NSAIDs are safe for you  Always read the medicine label and follow directions      · Ear drops  may contain medicine to decrease pain and inflammation      · Antibiotics  help treat a bacterial infection      · Take your medicine as directed  Contact your healthcare provider if you think your medicine is not helping or if you have side effects  Tell him or her if you are allergic to any medicine  Keep a list of the medicines, vitamins, and herbs you take  Include the amounts, and when and why you take them  Bring the list or the pill bottles to follow-up visits  Carry your medicine list with you in case of an emergency      Self-care:   · Apply heat  on your ear for 15 to 20 minutes, 3 to 4 times a day or as directed  You can apply heat with an electric heating pad, hot water bottle, or warm compress  Always put a cloth between your skin and the heat pack to prevent burns  Heat helps decrease pain      · Apply ice  on your ear for 15 to 20 minutes, 3 to 4 times a day for 2 days or as directed  Use an ice pack, or put crushed ice in a plastic bag  Cover it with a towel before you apply it to your ear  Ice decreases swelling and pain      Prevent an ear infection:   · Wash your hands often  to help prevent the spread of germs  Ask everyone in your house to wash their hands with soap and water  Ask them to wash after they use the bathroom or change a diaper  Remind them to wash before they prepare or eat food           · Stay away from people who are ill    Some germs spread easily and quickly through contact      Follow up with your doctor as directed:  Write down your questions so you remember to ask them during your visits  © Copyright [employer] 2022 Information is for End User's use only and may not be sold, redistributed or otherwise used for commercial purposes  All illustrations and images included in CareNotes® are the copyrighted property of A D A M [employer] Inc  or [employer]  The above information is an  only  It is not intended as medical advice for individual conditions or treatments  Talk to your doctor, nurse or pharmacist before following any medical regimen to see if it is safe and effective for you      Rhinosinusitis   AMBULATORY CARE:   Rhinosinusitis (RS)  is inflammation or infection of your nasal passages and sinuses  RS is most often caused by a virus but may be caused by bacteria  Acute RS lasts up to 12 weeks  Chronic RS lasts more than 12 weeks  Recurrent RS means you have 4 or more infections in 1 year         Common signs and symptoms:   · Fever     · Pain, pressure, redness, or swelling around the forehead, cheeks, or eyes     · Thick yellow or green discharge from your nose     · Loss of smell or taste     · Dry cough that happens mostly at night or when you lie down     · Headache and face pain that is worse when you lean forward     · Tooth pain, or pain when you chew     Seek care immediately if:   · You have trouble breathing, or wheezing that is getting worse      · You have a stiff neck, a fever, or a bad headache      · You cannot open your eye      · Your eyeball bulges out, or you cannot move your eye      · You are more sleepy than usual, or you notice changes in your ability to think, move, or talk      · You have swelling of your forehead or scalp      Call your doctor if:   · Your symptoms are worse or do not improve after 3 to 5 days of treatment       · You have questions or concerns about your condition or care      Treatment  may not be needed  Your symptoms may go away on their own   Your healthcare provider may recommend watchful waiting for up to 10 days before starting antibiotics  Antibiotics will not help if the infection is caused by a virus  Check with your provider before you take any over-the-counter medicines  You may need any of the following:  · Acetaminophen  decreases pain and fever  It is available without a doctor's order  Ask how much to take and how often to take it  Follow directions  Read the labels of all other medicines you are using to see if they also contain acetaminophen, or ask your doctor or pharmacist  Acetaminophen can cause liver damage if not taken correctly  Do not use more than 4 grams (4,000 milligrams) total of acetaminophen in one day       · NSAIDs , such as ibuprofen, help decrease swelling, pain, and fever  This medicine is available with or without a doctor's order  NSAIDs can cause stomach bleeding or kidney problems in certain people  If you take blood thinner medicine, always ask your healthcare provider if NSAIDs are safe for you  Always read the medicine label and follow directions      · Nasal steroid sprays  help decrease inflammation in your nose and sinuses      · Decongestants  help reduce swelling and drain mucus in the nose and sinuses  They may help you breathe easier  Do not use decongestants for more than 3 days      · Antihistamines  help dry mucus in the nose and relieve sneezing      · Antibiotics  help treat or prevent a bacterial infection      Self-care:   · Rinse your sinuses as directed  Use a sinus rinse device to rinse your nasal passages with a saline (salt water) solution or distilled water  Do not  use tap water  A sinus rinse will help thin the mucus in your nose and rinse away pollen and dirt  It will also help lower swelling so you can breathe normally      · Use a humidifier  to increase air moisture in your home  Moist air may make it easier for you to breathe and help decrease your cough      · Sleep with your head raised    Place an extra pillow under your head before you go to sleep to help your sinuses drain      · Drink liquids as directed  Ask your healthcare provider how much liquid to drink each day and which liquids are best for you  Liquids will thin the mucus in your nose and help it drain  Do not have drinks that contain alcohol or caffeine      · Do not smoke, and avoid secondhand smoke  Nicotine and other chemicals in cigarettes and cigars can make your symptoms worse  Ask your healthcare provider for information if you currently smoke and need help to quit  E-cigarettes or smokeless tobacco still contain nicotine  Talk to your healthcare provider before you use these products      Prevent the spread of germs:   · Wash your hands often with soap and water  Wash your hands after you use the bathroom, change a child's diaper, or sneeze  Wash your hands before you prepare or eat food           · Stay away from people who are sick  Some germs spread easily and quickly through contact      Follow up with your doctor as directed: You may be referred to an ear, nose, and throat specialist  Write down your questions so you remember to ask them during your visits  © Copyright [employer] 2022 Information is for End User's use only and may not be sold, redistributed or otherwise used for commercial purposes  All illustrations and images included in CareNotes® are the copyrighted property of A [employer] A M , Inc  or [address] [place] [address]  The above information is an  only  It is not intended as medical advice for individual conditions or treatments  Talk to your doctor, nurse or pharmacist before following any medical regimen to see if it is safe and effective for you              Follow up with PCP in 3-5 days  Proceed to  ER if symptoms worsen      Chief Complaint     Chief Complaint   Patient presents with   • Earache     Congestion, runny nose, mucus, left ear pain, decreased hearing, for 10 days         History of Present Illness Patient is a 49-year-old female with no significant past medical history presents for evaluation of sinus congestion and bilateral ear pain over the past 10 days  She denies fever, nausea/vomiting/diarrhea, rash, dizziness/lightheadedness/syncope, chest pain or palpitations  Review of Systems   Review of Systems   Constitutional: Negative for chills, fatigue and fever  HENT: Positive for congestion, ear pain and sinus pressure  Negative for ear discharge, postnasal drip, rhinorrhea, sinus pain, sneezing and sore throat  Eyes: Negative  Negative for pain, discharge, redness, itching and visual disturbance  Respiratory: Negative  Negative for apnea, cough, choking, chest tightness, shortness of breath, wheezing and stridor  Cardiovascular: Negative  Negative for chest pain and palpitations  Gastrointestinal: Negative  Negative for abdominal pain, diarrhea, nausea and vomiting  Endocrine: Negative  Negative for polydipsia, polyphagia and polyuria  Genitourinary: Negative  Negative for decreased urine volume, dysuria, flank pain and hematuria  Musculoskeletal: Negative  Negative for arthralgias, back pain, myalgias, neck pain and neck stiffness  Skin: Negative  Negative for color change and rash  Allergic/Immunologic: Negative  Negative for environmental allergies  Neurological: Negative  Negative for dizziness, seizures, syncope, facial asymmetry, light-headedness, numbness and headaches  Hematological: Negative  Negative for adenopathy  Psychiatric/Behavioral: Negative  All other systems reviewed and are negative          Current Medications       Current Outpatient Medications:   •  amoxicillin (AMOXIL) 500 mg capsule, Take 1 capsule (500 mg total) by mouth every 12 (twelve) hours for 10 days, Disp: 20 capsule, Rfl: 0  •  Calcium Carbonate-Vitamin D (CALCIUM 500/D PO), Take by mouth, Disp: , Rfl:   •  cetirizine (ZyrTEC) 10 mg tablet, TAKE 1 TABLET BY MOUTH EVERY MORNING FOR 14 DAYS, Disp: , Rfl:   •  fluticasone (FLONASE) 50 mcg/act nasal spray, 2 SPRAYS INTO EACH NOSTRIL 4 (FOUR) TIMES A DAY AS NEEDED FOR RHINITIS OR ALLERGIES, Disp: 16 mL, Rfl: 0  •  Multiple Vitamin (MULTIVITAMIN) capsule, Take 1 capsule by mouth daily, Disp: , Rfl:   •  Omega-3 Fatty Acids (FISH OIL BURP-LESS PO), Take by mouth, Disp: , Rfl:   •  Cetirizine HCl 10 MG CAPS, Take 1 capsule (10 mg total) by mouth in the morning for 14 days, Disp: 14 capsule, Rfl: 0    Current Allergies     Allergies as of 10/12/2022   • (No Known Allergies)            The following portions of the patient's history were reviewed and updated as appropriate: allergies, current medications, past family history, past medical history, past social history, past surgical history and problem list      Past Medical History:   Diagnosis Date   • Maternal excessive weight gain     57 lbs   • Pruritic urticarial papules and plaques of pregnancy (puppp) 2017   • Varicella w/o complication childhood    unsure   • Wears glasses        Past Surgical History:   Procedure Laterality Date   • [place]  DELIVERY ONLY N/A 2017    Procedure:  SECTION ();   Surgeon: Adolfo Duvall MD;  Location[facility];  Service: Obstetrics   • [place]  DELIVERY ONLY N/A 2020    Procedure:  SECTION () REPEAT;  Surgeon: Luis Angel Blake MD;  [facility];  Service: Obstetrics   • WISDOM TOOTH EXTRACTION         Family History   Problem Relation Age of Onset   • Hyperlipidemia Mother    • Hypertension Mother    • Arthritis Mother    • Diabetes type II Mother    • Hyperlipidemia Father    • Diabetes type II Sister    • Diabetes type II Sister    • No Known Problems Sister    • Seizures Brother    • No Known Problems Brother    • No Known Problems Brother    • No Known Problems Brother    • No Known Problems Brother    • No Known Problems Maternal Grandmother    • Lung cancer Maternal Grandfather    • No Known Problems Paternal Grandmother    • Lung cancer Paternal Grandfather    • No Known Problems Son    • No Known Problems Son          Medications have been verified  Objective   /61   Pulse 70   Temp 98 °F (36 7 °C)   Resp 16   SpO2 98%        Physical Exam     Physical Exam  Vitals and nursing note reviewed  Constitutional:       General: She is not in acute distress  Appearance: Normal appearance  She is not ill-appearing, toxic-appearing or diaphoretic  Interventions: She is not intubated  HENT:      Head: Normocephalic and atraumatic  Right Ear: Hearing, ear canal and external ear normal  Tympanic membrane is erythematous  Left Ear: Hearing, ear canal and external ear normal  Tympanic membrane is erythematous and bulging  Nose: Nose normal  No congestion or rhinorrhea  Mouth/Throat:      Mouth: Mucous membranes are moist       Pharynx: No oropharyngeal exudate or posterior oropharyngeal erythema  Eyes:      Extraocular Movements: Extraocular movements intact  Conjunctiva/sclera: Conjunctivae normal       Pupils: Pupils are equal, round, and reactive to light  Cardiovascular:      Rate and Rhythm: Normal rate and regular rhythm  Pulses: Normal pulses  Heart sounds: Normal heart sounds, S1 normal and S2 normal  Heart sounds not distant  No murmur heard  No friction rub  No gallop  Pulmonary:      Effort: Pulmonary effort is normal  No tachypnea, bradypnea, accessory muscle usage, prolonged expiration, respiratory distress or retractions  She is not intubated  Breath sounds: Normal breath sounds  No stridor, decreased air movement or transmitted upper airway sounds  No decreased breath sounds, wheezing, rhonchi or rales  Chest:      Chest wall: No tenderness  Abdominal:      General: Bowel sounds are normal       Palpations: Abdomen is soft  Tenderness: There is no abdominal tenderness  There is no guarding or rebound  Musculoskeletal:         General: Normal range of motion  Cervical back: Normal range of motion and neck supple  No rigidity or tenderness  Lymphadenopathy:      Cervical: No cervical adenopathy  Skin:     General: Skin is warm and dry  Capillary Refill: Capillary refill takes less than 2 seconds  Neurological:      General: No focal deficit present  Mental Status: She is alert and oriented to person, place, and time  Cranial Nerves: No cranial nerve deficit     Psychiatric:         Mood and Affect: Mood normal          Behavior: Behavior normal

## 2022-12-20 NOTE — TELEPHONE ENCOUNTER
Rhofade is not being covered   Please do a prior auth for azelaic acid 15% for rosacea Sushant Jones(Attending)

## 2023-02-14 ENCOUNTER — VBI (OUTPATIENT)
Dept: ADMINISTRATIVE | Facility: OTHER | Age: 33
End: 2023-02-14

## 2023-08-11 ENCOUNTER — OFFICE VISIT (OUTPATIENT)
Dept: INTERNAL MEDICINE CLINIC | Facility: CLINIC | Age: 33
End: 2023-08-11

## 2023-08-11 ENCOUNTER — HOSPITAL ENCOUNTER (OUTPATIENT)
Dept: RADIOLOGY | Facility: HOSPITAL | Age: 33
Discharge: HOME | End: 2023-08-11
Payer: COMMERCIAL

## 2023-08-11 VITALS
SYSTOLIC BLOOD PRESSURE: 102 MMHG | HEART RATE: 76 BPM | HEIGHT: 65 IN | TEMPERATURE: 98 F | BODY MASS INDEX: 23.66 KG/M2 | DIASTOLIC BLOOD PRESSURE: 66 MMHG | WEIGHT: 142 LBS

## 2023-08-11 DIAGNOSIS — M79.602 LEFT ARM PAIN: ICD-10-CM

## 2023-08-11 DIAGNOSIS — M79.602 LEFT ARM PAIN: Primary | ICD-10-CM

## 2023-08-11 PROCEDURE — 99212 OFFICE O/P EST SF 10 MIN: CPT | Performed by: INTERNAL MEDICINE

## 2023-08-11 PROCEDURE — 73060 X-RAY EXAM OF HUMERUS: CPT

## 2023-08-11 NOTE — PATIENT INSTRUCTIONS
Thank you for visiting our clinic! It was nice seeing you!     Today, we discussed-  - Please reach out to our clinic should your symptoms worsen or do not improve over next 10-14 days

## 2023-08-11 NOTE — PROGRESS NOTES
[facility] Visit Note  Mendel Washingtoning 27 y.o. female   MRN: [id number]    Assessment and Plan      Diagnoses and all orders for this visit:    Left arm pain: Likely consistent with biceps tendinopathy in the setting of patient lifting her children with her left arm. She does report that she is right-handed and will often lift her children on the left arm. She denies lifting any other heavy loads or any other recent injuries. This is nonexertional and not associated with any chest pain or shortness of breath. The pain is worsened with extension of the elbow on exam with patient having mild discomfort in the middle of the arm.   -     Diclofenac Sodium (VOLTAREN) 1 %; Apply 2 g topically 4 (four) times a day  -     Ambulatory Referral to Orthopedic Surgery; Future  -     Ambulatory Referral to Physical Therapy; Future  -     XR humerus left; Future  -     Discussed activity modification with patient   -     Follow up with our clinic in 3 weeks if no significant improvement in pain    Subjective     History of Present Illness:  Patient is a 27y.o. year old female here in the clinic for left shoulder pain. As per the patient, it has been going on for past 40 days. She denies any recent trauma or injury to the site. She denies carrying heavy loads but does report of carrying her children in left arm frequently. She reports of mild left arm pain in the middle with no radiation. She denies any chest pain, shortness of breath and is otherwise very active. The pain is not exertional. Pain worsened by extension at elbow. We spoke about getting physical therapy, using Tylenol on as-needed basis and Voltaren gel for pain relief. Discussed modification therapy with the patient. Patient requesting x-rays and orthopedic referral which were provided to the patient at today's visit.   She has no other acute concerns at this time and to follow-up with her clinic on as-needed basis. Review of Systems   Constitutional: Negative for activity change, chills and fever. Respiratory: Negative for cough, chest tightness and shortness of breath. Cardiovascular: Negative for chest pain and palpitations. Gastrointestinal: Negative for abdominal pain, constipation, nausea and vomiting. Neurological: Negative for dizziness and headaches. Current Outpatient Medications:   •  Calcium Carbonate-Vitamin D (CALCIUM 500/D PO), Take by mouth, Disp: , Rfl:   •  cetirizine (ZyrTEC) 10 mg tablet, TAKE 1 TABLET BY MOUTH EVERY MORNING FOR 14 DAYS, Disp: , Rfl:   •  Diclofenac Sodium (VOLTAREN) 1 %, Apply 2 g topically 4 (four) times a day, Disp: 50 g, Rfl: 1  •  fluticasone (FLONASE) 50 mcg/act nasal spray, 2 SPRAYS INTO EACH NOSTRIL 4 (FOUR) TIMES A DAY AS NEEDED FOR RHINITIS OR ALLERGIES, Disp: 16 mL, Rfl: 0  •  Multiple Vitamin (MULTIVITAMIN) capsule, Take 1 capsule by mouth daily, Disp: , Rfl:   •  Omega-3 Fatty Acids (FISH OIL BURP-LESS PO), Take by mouth, Disp: , Rfl:   No Known Allergies  Past Medical History:   Diagnosis Date   • Maternal excessive weight gain     57 lbs   • Pruritic urticarial papules and plaques of pregnancy (puppp) 2017   • Varicella w/o complication childhood    unsure   • Wears glasses      Past Surgical History:   Procedure Laterality Date   • [place]  DELIVERY ONLY N/A 2017    Procedure:  SECTION ();   Surgeon: Prakash Stewart MD;  Location: [place] ;  Service: Obstetrics   • [place]  DELIVERY ONLY N/A 2020    Procedure: Jeronimo Rubalcava () REPEAT;  Surgeon: Renan Dumont MD;  Location: [facility];  Service: Obstetrics   • WISDOM TOOTH EXTRACTION       Family History   Problem Relation Age of Onset   • Hyperlipidemia Mother    • Hypertension Mother    • Arthritis Mother    • Diabetes type II Mother    • Hyperlipidemia Father    • Diabetes type II Sister    • Diabetes type II Sister    • No Known Problems Sister    • Seizures Brother    • No Known Problems Brother    • No Known Problems Brother    • No Known Problems Brother    • No Known Problems Brother    • No Known Problems Maternal Grandmother    • Lung cancer Maternal Grandfather    • No Known Problems Paternal Grandmother    • Lung cancer Paternal Grandfather    • No Known Problems Son    • No Known Problems Son      Social History     Substance and Sexual Activity   Alcohol Use No     Social History     Substance and Sexual Activity   Drug Use No     Social History     Tobacco Use   Smoking Status Never   Smokeless Tobacco Never       Objective     Vitals:    08/11/23 1120   BP: 102/66   BP Location: Right arm   Patient Position: Sitting   Cuff Size: Adult   Pulse: 76   Temp: 98 °F (36.7 °C)   TempSrc: Temporal   Weight: 64.4 kg (142 lb)   Height: 5' 5" (1.651 m)       Physical Exam  Vitals reviewed. Constitutional:       Comments: Patient is Malay speaking, patient's  and children present in the room. Patient's  assisting with translation. HENT:      Head: Normocephalic. Cardiovascular:      Rate and Rhythm: Normal rate and regular rhythm. Pulses: Normal pulses. Heart sounds: Normal heart sounds. Pulmonary:      Effort: Pulmonary effort is normal.      Breath sounds: Normal breath sounds. Abdominal:      General: Bowel sounds are normal.      Palpations: Abdomen is soft. Musculoskeletal:      Comments: Mild tenderness to palpation in the mid-arm with some discomfort elicited on extension at elbow. Skin:     General: Skin is warm and dry. Capillary Refill: Capillary refill takes less than 2 seconds. Neurological:      Mental Status: She is alert and oriented to person, place, and time. Care Time Delivered:   I have spent 25 minutes with patient today in which greater than 50% of this time was spent in counseling/coordination of care.       Bunny Aguilar MD  Internal Medicine Residency PGY-3  [facility]      ==  PLEASE NOTE:  This encounter was completed utilizing the [employer] Voice Recognition Software. Grammatical errors, random word insertions, pronoun errors and incomplete sentences are occasional consequences of the system due to software limitations, ambient noise and hardware issues. These may be missed by proof reading prior to affixing electronic signature. Any questions or concerns about the content, text or information contained within the body of this dictation should be directly addressed to the physician for clarification. Please do not hesitate to call me directly if you have any any questions or concerns.

## 2023-09-06 ENCOUNTER — OFFICE VISIT (OUTPATIENT)
Dept: OBGYN CLINIC | Facility: CLINIC | Age: 33
End: 2023-09-06
Payer: COMMERCIAL

## 2023-09-06 VITALS
HEIGHT: 65 IN | SYSTOLIC BLOOD PRESSURE: 115 MMHG | BODY MASS INDEX: 23.66 KG/M2 | DIASTOLIC BLOOD PRESSURE: 60 MMHG | WEIGHT: 142 LBS

## 2023-09-06 DIAGNOSIS — M75.22 BICEPS TENDINITIS OF LEFT UPPER EXTREMITY: ICD-10-CM

## 2023-09-06 DIAGNOSIS — M79.602 LEFT ARM PAIN: ICD-10-CM

## 2023-09-06 DIAGNOSIS — M75.82 ROTATOR CUFF TENDINITIS, LEFT: Primary | ICD-10-CM

## 2023-09-06 PROCEDURE — 99243 OFF/OP CNSLTJ NEW/EST LOW 30: CPT | Performed by: FAMILY MEDICINE

## 2023-09-06 RX ORDER — SERTRALINE HYDROCHLORIDE 100 MG/1
100 TABLET, FILM COATED ORAL EVERY MORNING
COMMUNITY
Start: 2023-08-29

## 2023-09-06 RX ORDER — NAPROXEN 375 MG/1
375 TABLET, DELAYED RELEASE ORAL 2 TIMES DAILY WITH MEALS
Qty: 14 TABLET | Refills: 0 | Status: SHIPPED | OUTPATIENT
Start: 2023-09-06 | End: 2023-09-13

## 2023-09-06 NOTE — PATIENT INSTRUCTIONS
Rotator Cuff Injury Exercises   WHAT YOU NEED TO KNOW:   What do I need to know about rotator cuff injury exercises? Exercises help improve shoulder movement and strength, and decrease pain. Your physical therapist or healthcare provider will tell you when to start doing the exercises. He or she will tell you how often to do them. You will need to start slowly to prevent more injury. You will move through several levels over time as you get stronger and more flexible. What are some safety guidelines to follow? Always warm up before you do the exercises. Walk or ride a stationary bike for 5 to 10 minutes to help you warm up. Do not put your arm over your head until directed. You will need to wait until your injury has healed. The movement of some exercises could continue until your arm is over your head. You will need to stop the movement where directed. Stop if you feel pain. You may feel some tight or stiff areas when you start. This should get better as you continue the exercises. You should not feel pain. Pain means you are not ready to do the exercise yet. Stop and call your physical therapist or healthcare provider right away. Always work both rotator cuffs. Even if your injury is only on 1 side, it is important to do each exercise on both sides. This helps prevent injury and maintain balance in your shoulders and back. Use correct posture. Your physical therapist or healthcare provider will show you the proper posture for each exercise. You will be shown how to pull your shoulders back and down to engage the correct muscles. Remember not to let your shoulders shrug during an exercise unless it is part of the movement. How should I do stretching exercises? You will not feel every exercise in your shoulder area. You may feel some of the stretches in your back, side, or upper arm muscles. You need to work muscles in and around your rotator cuffs and down your arms.  This helps stabilize your shoulders. Your physical therapist or healthcare provider will tell you how long to hold each stretch. He or she will also tell you how many times to repeat each stretch during an exercise session. You may be told to do only certain exercises, or to do them in a specific order. The following are general directions to help you remember the exercises you are taught:  Pendulum swings:  Lean forward and rest your arm on a table. Do not round your back or lock your knees during the exercise. Let your other arm hang freely by your side. Gently swing your free arm forward and backward, side to side, and in circles. Crossover arm stretch:  Relax your shoulders. Hold your upper arm with the opposite hand. Pull your arm across your chest until you feel a stretch. Hold the stretch. Return to the starting position. Sleeper stretch:  Lie on your side on a firm, flat surface. Bend the elbow of your top arm 90°. Use your other hand to slowly push your arm down. Stop when you feel a stretch at the back of your shoulder. Hold the stretch. Slowly return to the starting position. Shoulder movement, up and down: This exercise may also be called shoulder extension. Sit in a chair that has a back but no armrests. Raise your arm like you are reaching for the wall in front of you. Continue to raise the arm until it is over your head, or as high as directed. Bring your arm back down to your side. Bring it back as far as possible behind your body. Return your arm to the starting position. Shoulder movement, side to side: These movements may be called flexion, internal rotation, and external rotation. Sit in a chair that has a back but no armrests. Raise your arm to the side and then up over your head as far as directed. Return your arm to your side. Bring your arm across the front of your body and reach for the opposite shoulder. Return your arm to the starting position.          Shoulder rolls:  Stand and raise both shoulders toward your ears. Lower them to the starting position. Relax your shoulders. Pull your shoulders back. Then relax them again. Roll your shoulders in a smooth [place]. Then roll your shoulders in a smooth [place] in the other direction. Wall reach exercise: This may also be called a flexion stretch. Stand facing a wall. Slowly walk your fingers up the wall until you feel a stretch. Hold the stretch. Return to the starting position. Arm reach exercise:  Lie on your back with your legs straight. Reach your arms like you are trying to touch the ceiling. Reach as high as you can so you feel a stretch in the back of your arms. Hold the stretch. Then lower your arms to your sides. Elbow bends:  Stand with your arms down to your sides. Keep your palm facing forward. Bend your elbow and try to touch your shoulder with your fingertips. Return your arm to the starting position. Up the back stretch:  Stand and put both arms behind your back. Put one hand under the other. Move the bottom hand and slowly push the upper hand up toward your head. You should feel a stretch in the front of your arm and shoulder. Be careful not to push too hard. Hold the stretch. Then return to the starting position. Triceps stretch:  Stand and drop your forearm down your back so your hand is pointing to the ground behind you. Your elbow should be pointing at the ceiling. Take your other hand and place it on your elbow. Gently and slowly push on the elbow until you feel a stretch in the back of your arm. Hold the stretch. Let go of your elbow and relax your arm. You may be shown how to do this stretch with a towel. The towel can be pulled gently with a hand behind your back at waist level. How should I do strengthening exercises? Strengthening exercises may include handheld weights or resistance bands.  Your physical therapist or healthcare provider will tell you how much weight or resistance to use. The general guide is to use light weights or low resistance and to do a high number of repetitions. You may be told to do only certain exercises, or to do them in a specific order. The following are general directions to help you remember the exercises you are taught:  Scapular squeeze:  Stand with your arms at your sides. Squeeze your shoulder blades together and hold this position. Then relax the muscles. Keep your shoulder back during the entire exercise. Wall pushups: This exercise is similar to a pushup done on the ground. The goal is to use your back and shoulder muscles to bring your upper body toward and away from the wall. Stand facing a wall. Put your hands on the wall. Bend your elbows to bring your upper body toward the wall. Straighten your arms to return to the starting position. Keep your feet close enough to the wall that you do not take a step when you bend your elbows. Standing row with exercise band:  Wrap the exercise band around a heavy, stable object at waist level. Make loop in the end of the band to create a handle, if needed. Hold the handle or loop and pull the band straight back toward your hip. Keep your shoulder down. Squeeze your shoulder blade. Hold this position. Then slowly return to the starting position. External rotation with arm abducted 90 degrees:  Wrap the exercise band around a heavy, stable object at waist level. Make loop in the end of the band to create a handle, if needed. Stand and hold the handle or loop. Bend your elbow and raise your arm to shoulder height. Keep your arm in this position. Raise your hand like you are pointing at the ceiling. Slowly return to the starting position. You may also be shown how to do this exercise lying down and with a weight. Shoulder abduction with weight:  Stand and hold a weight in your hand with your palm facing your body.  Slowly raise your arm to the side with your thumb pointing up. Then raise your arm as far as you can without pain. Hold this position. Then return to the starting position. Shoulder abduction with exercise band:  Wrap the exercise band around a heavy, stable object near your foot. Grab the band. Keep your arm straight. Slowly raise your arm to the side with your thumb pointing up. Then, slowly pull the band as far as you can without pain. Slowly return to the starting position. Shoulder adduction with weight:  Lie on your back on a firm surface. Hold a weight in your hand at your shoulder. Slowly raise your arm toward the ceiling and straighten your elbow. Hold this position. Then slowly return to the starting position. Shoulder adduction with exercise band:  Wrap the exercise band around a heavy, stable object. Stand and face away from where the band is anchored. Hold each end of the band in both hands with your elbows bent. Your elbows should not be behind your body. Keep your arms parallel to the floor and slowly straighten your elbows. Hold this position. Slowly return to the starting position. When should I call my doctor or physical therapist?   You have sharp or worsening pain during exercise or at rest.    You have questions or concerns about your rotator cuff injury exercises. CARE AGREEMENT:   You have the right to help plan your care. Learn about your health condition and how it may be treated. Discuss treatment options with your healthcare providers to decide what care you want to receive. You always have the right to refuse treatment. The above information is an  only. It is not intended as medical advice for individual conditions or treatments. Talk to your doctor, nurse or pharmacist before following any medical regimen to see if it is safe and effective for you.   © Copyright Jared Carrillo 2022 Information is for End User's use only and may not be sold, redistributed or otherwise used for commercial purposes.

## 2023-09-06 NOTE — PROGRESS NOTES
Assessment:     1. Rotator cuff tendinitis, left  naproxen (EC NAPROSYN) 375 MG TBEC    Ambulatory Referral to Physical Therapy      2. Left arm pain  Ambulatory Referral to Orthopedic Surgery    naproxen (EC NAPROSYN) 375 MG TBEC    Ambulatory Referral to Physical Therapy      3. Biceps tendinitis of left upper extremity  naproxen (EC NAPROSYN) 375 MG TBEC    Ambulatory Referral to Physical Therapy        Orders Placed This Encounter   Procedures   • Ambulatory Referral to Physical Therapy        Impression:   Left shoulder pain likely secondary to rotator cuff tendinitis specifically with the supra and infraspinatus, as well as proximal biceps tendinitis. Conservative Management   We discussed different treatment options:  Reviewed [facility] documentation completed on 08/11/2023. Left arm pain consisted of differential of biceps tendinopathy. Treatment plan consisted of x-ray, formal physical therapy, topical Voltaren gel, activity modification. • Ice or Heat Therapy as needed 1-2 times daily for 10-20 minutes. As tolerated. • Over the counter Tylenol as needed based off your Past Medical Hx. Please follow product label for dosing and maximum limits. • Will trial naproxen 375 mg twice daily x1 week. Patient denies chance of pregnancy, hypertension, kidney disease, gastric ulcer/gastritis. Upon prescribing patient did have additional naproxen within chart. Did review with patient not to take both naproxen. Patient states she is not taking any naproxen currently. • May benefit from topical trial.  Primary care physician did prescribe this. Patient trial of over the counter Topical Analgesics such as Lidocaine cream or Voltaren Gel, as tolerated. If skin becomes irritated, discontinue use. • Formal Handout provided on General Information of Rotator cuff exercises  • Please range joint through gentle range of motion as tolerated. • Initiate Formal Physical Therapy at any preferred location. Imaging   • Reviewed prior xrays obtain by Primary Care Physician. These were reviewed in office with patient today. • 2023: X-ray humerus: No acute osseous abnormality    Procedure  • No Injection performed today. May consider future corticosteroid injection depending on clinical exam/imaging. Shared decision making, patient agreeable to plan. Return for Follow up after 6-8 wks of formal therapy. HPI:   Scott Puente is a 27 y.o. female  who presents for evaluation of   Chief Complaint   Patient presents with   • Left Shoulder - Pain   • Left Arm - Pain     PCP, Dr. Wei Robles MD referral for left arm pain   [id number]  utilized    Injury Related: No     Onset/Mechanism: 3-4 months, discomfort staying the same, Denies any injury. Started gradually. Location: C-motion around humeral neck. Severity: Current severity: 0/10. Max severity: 6/10. Pain described as: pressure, restricted   Radiation: denies any neck pain, but will radiate down towards elbow. Provocative: reaching behind, lifting, carrying, overhead   Associated symptoms: denies swelling, ecchymosis, denies popping, clicking, clicking. Denies any hx of fracture of affected limb. , Denies any surgical history of affected limb.  , Denies any new or changes to previous numbness/ tingling of affected limb., Denies any new or changes to previous weakness down into affected extremity.      Summary of treatment to-date:   • Voltaren gel - not started   • Formal physical therapy - not started   • NSAIDs/tylenol - not started   • ICE/Heat therapy - not started        Following History Reviewed and Updated     Past Medical History:   Diagnosis Date   • Maternal excessive weight gain     57 lbs   • Pruritic urticarial papules and plaques of pregnancy (puppp) 2017   • Varicella w/o complication childhood    unsure   • Wears glasses      Past Surgical History:   Procedure Laterality Date   • [place]  DELIVERY ONLY N/A 2017    Procedure:  SECTION ();   Surgeon: Johny Simons MD;  Location: [facility];  Service: Obstetrics   • [place]  DELIVERY ONLY N/A 2020    Procedure:  SECTION () REPEAT;  Surgeon: Esmer Nunes MD;  Location: [facility];  Service: Obstetrics   • WISDOM TOOTH EXTRACTION       Family History   Problem Relation Age of Onset   • Hyperlipidemia Mother    • Hypertension Mother    • Arthritis Mother    • Diabetes type II Mother    • Hyperlipidemia Father    • Diabetes type II Sister    • Diabetes type II Sister    • No Known Problems Sister    • Seizures Brother    • No Known Problems Brother    • No Known Problems Brother    • No Known Problems Brother    • No Known Problems Brother    • No Known Problems Maternal Grandmother    • Lung cancer Maternal Grandfather    • No Known Problems Paternal Grandmother    • Lung cancer Paternal Grandfather    • No Known Problems Son    • No Known Problems Son        Social History     Substance and Sexual Activity   Alcohol Use No     Social History     Substance and Sexual Activity   Drug Use No     Social History     Tobacco Use   Smoking Status Never   Smokeless Tobacco Never       Social Determinants of Health     Tobacco Use: Low Risk  (2023)    Patient History    • Smoking Tobacco Use: Never    • Smokeless Tobacco Use: Never    • Passive Exposure: Not on file   Alcohol Use: Not At Risk (2022)    AUDIT-C    • Frequency of Alcohol Consumption: Never    • Average Number of Drinks: Not on file    • Frequency of Binge Drinking: Never   Financial Resource Strain: Low Risk  (2023)    Overall Financial Resource Strain (CARDIA)    • Difficulty of Paying Living Expenses: Not hard at all   Food Insecurity: No Food Insecurity (2023)    Hunger Vital Sign    • Worried About Running Out of Food in the Last Year: Never true    • Ran Out of Food in the Last Year: Never true   Transportation Needs: No Transportation Needs (8/11/2023)    PRAPARE - Transportation    • Lack of Transportation (Medical): No    • Lack of Transportation (Non-Medical): No   Physical Activity: Inactive (7/15/2021)    Exercise Vital Sign    • Days of Exercise per Week: 0 days    • Minutes of Exercise per Session: 0 min   Stress: No Stress Concern Present (12/23/2021)    [facility]    • Feeling of Stress : Not at all   Social Connections: Moderately Isolated (12/23/2021)    Social Connection and Isolation Panel [NHANES]    • Frequency of Communication with Friends and Family: More than three times a week    • Frequency of Social Gatherings with Friends and Family: More than three times a week    • Attends Baptism Services: Never    • Active Member of Clubs or Organizations: No    • Attends Club or Organization Meetings: Never    • Marital Status:    Intimate Partner Violence: Not At Risk (12/23/2021)    Humiliation, Afraid, Rape, and Kick questionnaire    • Fear of Current or Ex-Partner: No    • Emotionally Abused: No    • Physically Abused: No    • Sexually Abused: No   Depression: Not at risk (8/11/2023)    PHQ-2    • PHQ-2 Score: 0   Housing Stability: Low Risk  (2/22/2022)    Housing Stability Vital Sign    • Unable to Pay for Housing in the Last Year: No    • Number of Places Lived in the Last Year: 1    • Unstable Housing in the Last Year: No        No Known Allergies    Review of Systems      Review of Systems   Review of Systems   Constitutional: Negative for chills and fever. HENT: Negative for drooling and sneezing. Eyes: Negative for redness. Respiratory: Negative for cough and wheezing. Gastrointestinal: Negative for vomiting. Psychiatric/Behavioral: Negative for behavioral problems. The patient is not nervous/anxious. All other systems negative. Physical Exam   Physical Exam    Vitals and nursing note reviewed. Constitutional:   Appearance.  Normal Appearance. /60   Ht 5' 5" (1.651 m)   Wt 64.4 kg (142 lb)   BMI 23.63 kg/m²     Body mass index is 23.63 kg/m². HENT:  Head: Atraumatic. Nose: Nose normal  Eyes: Conjunctiva/sclera: Conjunctivae normal.  Cardiovascular:   Rate and Rhythm: Bilateral equal distal pulses  Pulmonary:   Effort: Pulmonary effort is normal  Skin:   General: Skin is warm and dry. Neurological:   General: No focal deficit present. Mental Status: Alert and oriented to person, place, and time.    Psychiatric:   Mood and Affect: mood normal.  Behavior: Behavior normal     Musculoskeletal Exam     Ortho Exam   INSPECTION:  - Erythema: no  - Swelling: no  - Ecchymosis: no  - Increased warmth: no    PALPATION/TENDERNESS:  - Tenderness: no    RANGE OF MOTION:  - Forward flexion: full, with discomfort anterior shoulder  -Abduction: full, with discomfort anterior shoulder  - Internal rotation in Adduction: full around level of thoracic spine, without discomfort  - External rotation in Adduction: full, without discomfort  - Internal rotation in 90 degrees Adduction: full, without discomfort  - External rotation in 90 degrees Abduction: full, with discomfort  - Horizontal adduction: full, without discomfort      STRENGTH:  - Flexion: 5/5  - Abduction: 5/5  - Adduction: 5/5  - Internal rotation: 5/5 without discomfort  - External rotation: 5/5 with discomfort    IMPINGEMENT:  - Neer's: Positive  - Urena-Isauro: negative    LABRUM:  - Kemp's:   - Labral Crank Test:     ROTATOR CUFF:  - Rotator cuff tear (Drop-Arm): Negative  - Supraspinatus (Empty can): Positive for pain, without weakness  - Infraspinatus (External rotation against resistance): Positive for pain without weakness  - Subscapularis (Belly press): negative    BICEPS TENDINOPATHY:  - Resisted forward flexion (Speed's): Discomfort noted at proximal long head of the biceps tendon  - Resisted supination (Yergason's):     AC JOINT:  - Forced cross body adduction (Scarf cross-arm): negative  - Job's AC compression: negative    APPREHENSION  -Apprehension:   - Umesh's Relocation Maneuver:     Neurovascularly intact distally: yes             Procedures       Portions of the record may have been created with voice recognition software. Occasional wrong word or "sound alike" substitutions may have occurred due to the inherent limitations of voice recognition software. Please review the chart carefully and recognize, using context, where substitutions/typographical errors may have occurred.

## 2023-09-06 NOTE — LETTER
September 6, 2023     Johanne Mauro MD  [facility] [employer]  [address] [address]    Patient: Robby Ashraf   YOB: 1990   Date of Visit: 9/6/2023       Dear Dr. Velasquez Seen:    Thank you for referring Robby Ashraf to me for evaluation. Below are the relevant portions of my assessment and plan of care. If you have questions, please do not hesitate to call me. I look forward to following Radha along with you.          Sincerely,        Tiffanie Ellis,         CC: No Recipients

## 2023-12-13 ENCOUNTER — OFFICE VISIT (OUTPATIENT)
Dept: URGENT CARE | Age: 33
End: 2023-12-13
Payer: COMMERCIAL

## 2023-12-13 VITALS
DIASTOLIC BLOOD PRESSURE: 57 MMHG | TEMPERATURE: 97.5 F | RESPIRATION RATE: 18 BRPM | OXYGEN SATURATION: 98 % | SYSTOLIC BLOOD PRESSURE: 111 MMHG | HEART RATE: 83 BPM

## 2023-12-13 DIAGNOSIS — J02.9 ACUTE VIRAL PHARYNGITIS: Primary | ICD-10-CM

## 2023-12-13 PROCEDURE — 99213 OFFICE O/P EST LOW 20 MIN: CPT

## 2023-12-13 PROCEDURE — 87070 CULTURE OTHR SPECIMN AEROBIC: CPT

## 2023-12-13 NOTE — PROGRESS NOTES
[facility]        NAME: Staci Friend is a 26 y.o. female  : 1990    MRN: [id number]  DATE: 2023  TIME: 11:57 AM    Assessment and Plan   Acute viral pharyngitis [J02.9]  1. Acute viral pharyngitis          Pt presents with complaints of sore throat. [employer] ([employer])  via [employer] utilized. Physical exam reveal erythema to throat, without swelling and a midline uvula. Patient's voice is unchanged per family in room. Patient able to manage oral secretions. Rapid strep in the office today is negative. Pt does not meet [facility] Criteria for empiric treatment at this time. Discussed with patient that the rapid strep only tests for Group A strep so I recommend we send for culture to rule out strep completely. At this time it appears that their sore throat is the result of a viral infection or allergies which cannot be treated with antibiotics and I recommend treating with OTC lozenges, salt water gargles, and over the counter antihistamine. We discussed that all medications have different side effects and could interfere with their other medications or result in an allergic reaction. We will call the patient if the strep culture comes back positive. Pt will follow-up with their PCP in 3-5 days if symptoms are not improved and culture results are negative. They will report to the emergency department if symptoms worsen or new symptoms such as jaw pain, difficulty swallowing, neck pain, or hoarseness develop as these are concerning for the development of a possible peritonsilar, tonsilar, or retropharyngeal abscess. Patient Instructions       Follow up with PCP in 3-5 days. Proceed to  ER if symptoms worsen. Chief Complaint     Chief Complaint   Patient presents with    Sore Throat     Started 1 week ago. History of Present Illness       26ear old female with sore throat for 1 week. Pt reports right ear pain.   Pt denies fever, cough, congestion, abd pain, nausea, vomiting, diarrhea, chest pain or sob. Luis Negus Pt reports taking tylenol without relief. Sore Throat   Associated symptoms include ear pain (right). Pertinent negatives include no abdominal pain, congestion, coughing, shortness of breath or vomiting. Review of Systems   Review of Systems   Constitutional:  Negative for chills and fever. HENT:  Positive for ear pain (right) and sore throat. Negative for congestion and postnasal drip. Eyes:  Negative for pain and visual disturbance. Respiratory:  Negative for cough and shortness of breath. Cardiovascular:  Negative for chest pain and palpitations. Gastrointestinal:  Negative for abdominal pain and vomiting. Endocrine: Negative. Genitourinary:  Negative for dysuria and hematuria. Musculoskeletal:  Negative for arthralgias and back pain. Skin:  Negative for color change and rash. Neurological:  Negative for seizures and syncope. All other systems reviewed and are negative.         Current Medications       Current Outpatient Medications:     Calcium Carbonate-Vitamin D (CALCIUM 500/D PO), Take by mouth, Disp: , Rfl:     cetirizine (ZyrTEC) 10 mg tablet, TAKE 1 TABLET BY MOUTH EVERY MORNING FOR 14 DAYS, Disp: , Rfl:     Diclofenac Sodium (VOLTAREN) 1 %, Apply 2 g topically 4 (four) times a day, Disp: 50 g, Rfl: 1    fluticasone (FLONASE) 50 mcg/act nasal spray, 2 SPRAYS INTO EACH NOSTRIL 4 (FOUR) TIMES A DAY AS NEEDED FOR RHINITIS OR ALLERGIES, Disp: 16 mL, Rfl: 0    Multiple Vitamin (MULTIVITAMIN) capsule, Take 1 capsule by mouth daily, Disp: , Rfl:     Omega-3 Fatty Acids (FISH OIL BURP-LESS PO), Take by mouth, Disp: , Rfl:     sertraline (ZOLOFT) 100 mg tablet, Take 100 mg by mouth every morning, Disp: , Rfl:     sertraline (ZOLOFT) 50 mg tablet, Take 50 mg by mouth every evening, Disp: , Rfl:     naproxen (EC NAPROSYN) 375 MG TBEC, Take 1 tablet (375 mg total) by mouth 2 (two) times a day with meals for 7 days, Disp: 14 tablet, Rfl: 0    Current Allergies     Allergies as of 2023    (No Known Allergies)            The following portions of the patient's history were reviewed and updated as appropriate: allergies, current medications, past family history, past medical history, past social history, past surgical history and problem list.     Past Medical History:   Diagnosis Date    Maternal excessive weight gain     57 lbs    Pruritic urticarial papules and plaques of pregnancy (puppp) 2017    Varicella w/o complication childhood    unsure    Wears glasses        Past Surgical History:   Procedure Laterality Date    [place]  DELIVERY ONLY N/A 2017    Procedure:  SECTION (); Surgeon: Adams Lynn MD;  Location: [facility] ;  Service: Obstetrics    [place]  DELIVERY ONLY N/A 2020    Procedure: Devika Gave () REPEAT;  Surgeon: Broderick Toribio MD;  Location: [facility] ;  Service: Obstetrics    WISDOM TOOTH EXTRACTION         Family History   Problem Relation Age of Onset    Hyperlipidemia Mother     Hypertension Mother     Arthritis Mother     Diabetes type II Mother     Hyperlipidemia Father     Diabetes type II Sister     Diabetes type II Sister     No Known Problems Sister     Seizures Brother     No Known Problems Brother     No Known Problems Brother     No Known Problems Brother     No Known Problems Brother     No Known Problems Maternal Grandmother     Lung cancer Maternal Grandfather     No Known Problems Paternal Grandmother     Lung cancer Paternal Grandfather     No Known Problems Son     No Known Problems Son          Medications have been verified. Objective   /57   Pulse 83   Temp 97.5 °F (36.4 °C)   Resp 18   SpO2 98%   No LMP recorded. Physical Exam     Physical Exam  Vitals and nursing note reviewed. Constitutional:       General: She is not in acute distress. Appearance: Normal appearance. She is normal weight.    HENT:      Head: Normocephalic and atraumatic. Right Ear: External ear normal. No decreased hearing noted. Tympanic membrane is injected. Left Ear: Tympanic membrane, ear canal and external ear normal. No decreased hearing noted. Ears:        Nose: Nose normal.      Mouth/Throat:      Mouth: Mucous membranes are moist. No oral lesions. Pharynx: Oropharynx is clear. Uvula midline. Posterior oropharyngeal erythema present. No pharyngeal swelling, oropharyngeal exudate or uvula swelling. Tonsils: No tonsillar exudate or tonsillar abscesses. 1+ on the right. 1+ on the left. Eyes:      Extraocular Movements: Extraocular movements intact. Conjunctiva/sclera: Conjunctivae normal.   Cardiovascular:      Rate and Rhythm: Normal rate and regular rhythm. Pulses: Normal pulses. Heart sounds: Normal heart sounds. Pulmonary:      Effort: Pulmonary effort is normal. No respiratory distress. Breath sounds: Normal breath sounds. No stridor. No wheezing or rhonchi. Abdominal:      General: Abdomen is flat. Bowel sounds are normal.      Palpations: Abdomen is soft. Musculoskeletal:         General: Normal range of motion. Cervical back: Normal range of motion. Skin:     General: Skin is warm and dry. Capillary Refill: Capillary refill takes less than 2 seconds. Neurological:      General: No focal deficit present. Mental Status: She is alert.

## 2023-12-13 NOTE — PATIENT INSTRUCTIONS
Please trial warm salt water gargles, Chloraseptic spray, Cepacol cough drops and warm tea with honey as needed for sore throat. Tylenol and motrin for pain and fever  Got to ER with any worsening symptoms.

## 2023-12-15 LAB — BACTERIA THROAT CULT: NORMAL

## 2024-03-28 ENCOUNTER — OFFICE VISIT (OUTPATIENT)
Dept: INTERNAL MEDICINE CLINIC | Facility: CLINIC | Age: 34
End: 2024-03-28

## 2024-03-28 VITALS
HEART RATE: 67 BPM | OXYGEN SATURATION: 98 % | TEMPERATURE: 98.5 F | WEIGHT: 150.2 LBS | DIASTOLIC BLOOD PRESSURE: 69 MMHG | SYSTOLIC BLOOD PRESSURE: 112 MMHG | BODY MASS INDEX: 24.99 KG/M2

## 2024-03-28 DIAGNOSIS — M79.641 RIGHT HAND PAIN: ICD-10-CM

## 2024-03-28 DIAGNOSIS — L72.9 SCALP CYST: ICD-10-CM

## 2024-03-28 DIAGNOSIS — R22.31 WRIST LUMP, RIGHT: Primary | ICD-10-CM

## 2024-03-28 PROCEDURE — 99214 OFFICE O/P EST MOD 30 MIN: CPT | Performed by: FAMILY MEDICINE

## 2024-03-28 PROCEDURE — G9920 SCRNING PERF AND NEGATIVE: HCPCS | Performed by: FAMILY MEDICINE

## 2024-03-28 NOTE — PROGRESS NOTES
Name: Radha Abbasi      : 1990      MRN: [id number]  Encounter Provider: Crystal Johnson MD  Encounter Date: 3/28/2024   Encounter department: [facility]    Assessment & Plan     1. Wrist lump, right  Assessment & Plan:  See HPI , no injury to area , rec avoid offending positions , actions , trial wrist brace , ibuprofen 600 up to tid , moist heat , ice applications . Rec ortho eval     Orders:  -     Ambulatory Referral to Orthopedic Surgery; Future    2. Right hand pain  Assessment & Plan:  Sx started after she noted the lumps , she describes pain both distal along thenar area and proximal ulnar aspect wrist , rec avoid offending positions actions , trial wrist splint , can try ice heat applications , ibuprofen 600 mg up to tid , await ortho eval    Orders:  -     Ambulatory Referral to Orthopedic Surgery; Future    3. Scalp cyst  Assessment & Plan:  Pt has had this x years not inflamed no drainage , as it doesn't hurt and no drainage, observe for now , pt is agreeable , if sx change concern arises she will call              Subjective      Wrist Pain   Pertinent negatives include no fever.    Pt c/o R wrist pain lumps , interpretor Lv # present during , she noted lump R wrist 7-8 mo ago , it was small and caused no pain , now 2 mo ago she noted another lump ~ 2 mo ago and since then has had pain radiating to her hand , - hx injury to that area , no change in routine , over use , when 2nd lump came she had lv working in her kitchen cabinets , no prior lumps in the area , the lumps are not discolored , - pulsation   Review of Systems   Constitutional:  Negative for chills and fever.   HENT:  Negative for ear pain and sore throat.         Scalp lump    Eyes:  Negative for pain and visual disturbance.   Respiratory:  Negative for cough and shortness of breath.    Cardiovascular:  Negative for chest pain and palpitations.   Gastrointestinal:  Negative for abdominal  pain and vomiting.   Genitourinary:  Negative for dysuria and hematuria.   Musculoskeletal:  Negative for arthralgias, back pain and joint swelling.        Wrist pain , lumps R wrist    Skin:  Negative for color change and rash.   Neurological:  Negative for seizures and syncope.   All other systems reviewed and are negative.      Current Outpatient Medications on File Prior to Visit   Medication Sig    Calcium Carbonate-Vitamin D (CALCIUM 500/D PO) Take by mouth    cetirizine (ZyrTEC) 10 mg tablet TAKE 1 TABLET BY MOUTH EVERY MORNING FOR 14 DAYS    Diclofenac Sodium (VOLTAREN) 1 % Apply 2 g topically 4 (four) times a day    fluticasone (FLONASE) 50 mcg/act nasal spray 2 SPRAYS INTO EACH NOSTRIL 4 (FOUR) TIMES A DAY AS NEEDED FOR RHINITIS OR ALLERGIES    Multiple Vitamin (MULTIVITAMIN) capsule Take 1 capsule by mouth daily    naproxen (EC NAPROSYN) 375 MG TBEC Take 1 tablet (375 mg total) by mouth 2 (two) times a day with meals for 7 days    Omega-3 Fatty Acids (FISH OIL BURP-LESS PO) Take by mouth    sertraline (ZOLOFT) 100 mg tablet Take 100 mg by mouth every morning    sertraline (ZOLOFT) 50 mg tablet Take 50 mg by mouth every evening       Objective     /69 (BP Location: Right arm, Patient Position: Sitting, Cuff Size: Standard)   Pulse 67   Temp 98.5 °F (36.9 °C) (Temporal)   Wt 68.1 kg (150 lb 3.2 oz)   SpO2 98%   BMI 24.99 kg/m²     Physical Exam  Constitutional:       Comments: Skin with good color turgor , well hydrated ,no distress noted     HENT:      Head: Normocephalic and atraumatic.      Comments: Parietal area L hewitt large sebaceous cyst , not inflamed - drainage   Neck:      Thyroid: No thyromegaly.   Cardiovascular:      Rate and Rhythm: Normal rate and regular rhythm.      Heart sounds: Normal heart sounds.   Pulmonary:      Breath sounds: Normal breath sounds.   Musculoskeletal:      Right forearm: No swelling, edema, deformity or tenderness.      Right wrist: Deformity and tenderness  present. No swelling, bony tenderness or crepitus. Normal range of motion.      Comments: R volar aspect of wrist 2 small palpable lumps more evident with extension of wrist - discoloration - pulsatile , + TTP    Lymphadenopathy:      Cervical:      Right cervical: No superficial cervical adenopathy.     Left cervical: No superficial cervical adenopathy.       Crystal Johnson MD

## 2024-04-01 PROBLEM — L72.9 SCALP CYST: Status: ACTIVE | Noted: 2024-04-01

## 2024-04-01 NOTE — ASSESSMENT & PLAN NOTE
Pt has had this x years not inflamed no drainage , as it doesn't hurt and no drainage, observe for now , pt is agreeable , if sx change concern arises she will call

## 2024-04-01 NOTE — ASSESSMENT & PLAN NOTE
Sx started after she noted the lumps , she describes pain both distal along thenar area and proximal ulnar aspect wrist , rec avoid offending positions actions , trial wrist splint , can try ice heat applications , ibuprofen 600 mg up to tid , await ortho eval

## 2024-04-01 NOTE — ASSESSMENT & PLAN NOTE
See HPI , no injury to area , rec avoid offending positions , actions , trial wrist brace , ibuprofen 600 up to tid , moist heat , ice applications . Rec ortho eval

## 2024-04-17 ENCOUNTER — OFFICE VISIT (OUTPATIENT)
Dept: OBGYN CLINIC | Facility: MEDICAL CENTER | Age: 34
End: 2024-04-17
Payer: COMMERCIAL

## 2024-04-17 VITALS
SYSTOLIC BLOOD PRESSURE: 109 MMHG | DIASTOLIC BLOOD PRESSURE: 61 MMHG | HEART RATE: 82 BPM | HEIGHT: 65 IN | WEIGHT: 147 LBS | BODY MASS INDEX: 24.49 KG/M2

## 2024-04-17 DIAGNOSIS — M67.431 GANGLION OF RIGHT WRIST: Primary | ICD-10-CM

## 2024-04-17 DIAGNOSIS — M79.641 RIGHT HAND PAIN: ICD-10-CM

## 2024-04-17 DIAGNOSIS — R22.31 WRIST LUMP, RIGHT: ICD-10-CM

## 2024-04-17 PROCEDURE — 99203 OFFICE O/P NEW LOW 30 MIN: CPT | Performed by: ORTHOPAEDIC SURGERY

## 2024-04-17 NOTE — PROGRESS NOTES
"The HAND & UPPER EXTREMITY OFFICE VISIT   Referred By:  Crystal Johnson Md  [facility] [address] [place] [address]   [address]  [place] [address]      Chief Complaint:     Right wrist mass    History of Present Illness:   33 y.o., Right hand dominant female presents with Right wrist masses  Patient speaks Tajik. Translation used    Patient states she has a mass noted on her radial wrist 8 months ago. A second one appears a few months ago and is now causing intermittent pain. Some days they get bigger when she is more active. She notes pain and \"hot\" feeling for the past month  She states that her wrist is painful with activities but feels better with rest. Today she rested so she has minimal pain  No known injury. No treatments.  Denies numbness or tingling.      Past Medical History:  Past Medical History:   Diagnosis Date    Maternal excessive weight gain     57 lbs    Pruritic urticarial papules and plaques of pregnancy (puppp) 2017    Varicella w/o complication childhood    unsure    Wears glasses      Past Surgical History:   Procedure Laterality Date    [place]  DELIVERY ONLY N/A 2017    Procedure:  SECTION ();  Surgeon: Maty Peace MD;  Location: [facility] ;  Service: Obstetrics    [place]  DELIVERY ONLY N/A 2020    Procedure:  SECTION () REPEAT;  Surgeon: Robe Escobedo MD;  Location: [facility] ;  Service: Obstetrics    WISDOM TOOTH EXTRACTION       Family History   Problem Relation Age of Onset    Hyperlipidemia Mother     Hypertension Mother     Arthritis Mother     Diabetes type II Mother     Hyperlipidemia Father     Diabetes type II Sister     Diabetes type II Sister     No Known Problems Sister     Seizures Brother     No Known Problems Brother     No Known Problems Brother     No Known Problems Brother     No Known Problems Brother     No Known Problems Maternal Grandmother     Lung cancer Maternal Grandfather     No Known Problems Paternal " Grandmother     Lung cancer Paternal Grandfather     No Known Problems Son     No Known Problems Son      Social History     Socioeconomic History    Marital status: /Civil Union     Spouse name: Adrien     Number of children: 2    Years of education: Not on file    Highest education level: Not on file   Occupational History    Not on file   Tobacco Use    Smoking status: Never    Smokeless tobacco: Never   Vaping Use    Vaping status: Never Used   Substance and Sexual Activity    Alcohol use: No    Drug use: No    Sexual activity: Yes     Partners: Male     Birth control/protection: None   Other Topics Concern    Not on file   Social History Narrative    Who lives in your home: Spouse and children     What type of home do you live in: Single house    Age of your home: Bit 30 yrs ago     How long have you been living there: 2019    Type of heat: Baseboard    Type of fuel: Gas    What type of kevin is in your bedroom: Carpet    Do you have the following in or near your home:    Air products: Central air    Pests: None    Pets: None    Are pets allowed in bedroom: N/A    Open fields, wooded areas nearby: N/A    Basement: Wet, Dry, Damp, Musty and Unfinished    Exposure to second hand smoke: No        Habits:    Caffeine: coffee 1 cup daily-hot tea 1 cup daily     Chocolate: daily     Other:     Social Determinants of Health     Financial Resource Strain: Low Risk  (3/28/2024)    Overall Financial Resource Strain (CARDIA)     Difficulty of Paying Living Expenses: Not hard at all   Food Insecurity: No Food Insecurity (3/28/2024)    Hunger Vital Sign     Worried About Running Out of Food in the Last Year: Never true     Ran Out of Food in the Last Year: Never true   Transportation Needs: No Transportation Needs (3/28/2024)    PRAPARE - Transportation     Lack of Transportation (Medical): No     Lack of Transportation (Non-Medical): No   Physical Activity: Inactive (7/15/2021)    Exercise Vital Sign     Days of  "Exercise per Week: 0 days     Minutes of Exercise per Session: 0 min   Stress: No Stress Concern Present (12/23/2021)    [place] [employer] - Occupational Stress Questionnaire     Feeling of Stress : Not at all   Social Connections: Moderately Isolated (12/23/2021)    Social Connection and Isolation Panel [NHANES]     Frequency of Communication with Friends and Family: More than three times a week     Frequency of Social Gatherings with Friends and Family: More than three times a week     Attends Hindu Services: Never     Active Member of Clubs or Organizations: No     Attends Club or Organization Meetings: Never     Marital Status:    Intimate Partner Violence: Not At Risk (12/23/2021)    Humiliation, Afraid, Rape, and Kick questionnaire     Fear of Current or Ex-Partner: No     Emotionally Abused: No     Physically Abused: No     Sexually Abused: No   Housing Stability: Low Risk  (3/28/2024)    Housing Stability Vital Sign     Unable to Pay for Housing in the Last Year: No     Number of Places Lived in the Last Year: 1     Unstable Housing in the Last Year: No     Scheduled Meds:  Continuous Infusions:No current facility-administered medications for this visit.    PRN Meds:.  No Known Allergies        Physical Examination:    /61   Pulse 82   Ht 5' 5\" (1.651 m)   Wt 66.7 kg (147 lb)   BMI 24.46 kg/m²     Gen: A&Ox3, NAD  Cardiac: regular rate  Chest: non labored breathing  Abdomen: Non-distended        Right Upper Extremity:  Skin CDI  No obvious deformity of the shoulder, arm, elbow, forearm, wrist, hand  Composite fist  Sensation intact to light touch in the axillary median, ulnar, and radial nerve distributions   motor intact  2+RP  Non tender small masses noted Radial volar wrist, each approximately 6 mm in diameter and adjacent to each other, firm    Left Upper Extremity:  Skin CDI  No obvious deformity of the shoulder, arm, elbow, forearm, wrist, hand  Sensation " intact to light touch in the axillary median, ulnar, and radial nerve distributions  motor intact  2+RP      Studies:  No images obtained      Assessment and Plan:  1. Ganglion of right wrist  Durable Medical Equipment      2. Wrist lump, right  Ambulatory Referral to Orthopedic Surgery      3. Right hand pain  Ambulatory Referral to Orthopedic Surgery          33 y.o. female presents with signs and symptoms consistent with the above diagnosis.  We discussed the natural history of this condition and its pathogenesis.  We discussed operative and nonoperative treatment options.    This is most consistent with a volar ganglion cyst.  We discussed her treatment options are to monitor her cysts with or without bracing, aspiration or remove surgically.  Brace use may help with her wrist pain during activities and allow the inflammation to subside.  Patient would like to start with bracing. She may also use NSAID's for pain and inflammation. She should wear the brace during the day but can remove to sleep.     I recommend she return in 4 weeks for a recheck    she expressed understanding of the plan and agreed. We encouraged them to contact our office with any questions or concerns.         Doroteo Boggs MD  Hand and Upper Extremity Surgery        *This note was dictated using Dragon voice recognition software. Please excuse any word substitutions or errors.*

## 2024-07-16 ENCOUNTER — OFFICE VISIT (OUTPATIENT)
Dept: OBGYN CLINIC | Facility: MEDICAL CENTER | Age: 34
End: 2024-07-16
Payer: COMMERCIAL

## 2024-07-16 VITALS
SYSTOLIC BLOOD PRESSURE: 114 MMHG | DIASTOLIC BLOOD PRESSURE: 70 MMHG | HEART RATE: 90 BPM | BODY MASS INDEX: 25.66 KG/M2 | WEIGHT: 154 LBS | HEIGHT: 65 IN

## 2024-07-16 DIAGNOSIS — M67.431 GANGLION OF RIGHT WRIST: Primary | ICD-10-CM

## 2024-07-16 PROCEDURE — 99213 OFFICE O/P EST LOW 20 MIN: CPT | Performed by: ORTHOPAEDIC SURGERY

## 2024-07-16 NOTE — PROGRESS NOTES
HAND & UPPER EXTREMITY OFFICE VISIT   Referred By:  No referring provider defined for this encounter.      Chief Complaint:     Right wrist mass    Previous History:   Previously seen on . At that point she elected to proceed with a period of bracing and NSAIDs as needed for pain control.     Interval History:  Since the last visit she reports her pain is better since the last visit. She still notes intermittent pain in the wrist. She has been wearing the brace occasionally. She denies any pain today.     Past Medical History:  Past Medical History:   Diagnosis Date    Maternal excessive weight gain     57 lbs    Pruritic urticarial papules and plaques of pregnancy (puppp) 2017    Varicella w/o complication childhood    unsure    Wears glasses      Past Surgical History:   Procedure Laterality Date    [place]  DELIVERY ONLY N/A 2017    Procedure:  SECTION ();  Surgeon: Maty Peace MD;  Location: [facility] ;  Service: Obstetrics    [place]  DELIVERY ONLY N/A 2020    Procedure:  SECTION () REPEAT;  Surgeon: Robe Escobedo MD;  Location: [facility] ;  Service: Obstetrics    WISDOM TOOTH EXTRACTION       Family History   Problem Relation Age of Onset    Hyperlipidemia Mother     Hypertension Mother     Arthritis Mother     Diabetes type II Mother     Hyperlipidemia Father     Diabetes type II Sister     Diabetes type II Sister     No Known Problems Sister     Seizures Brother     No Known Problems Brother     No Known Problems Brother     No Known Problems Brother     No Known Problems Brother     No Known Problems Maternal Grandmother     Lung cancer Maternal Grandfather     No Known Problems Paternal Grandmother     Lung cancer Paternal Grandfather     No Known Problems Son     No Known Problems Son      Social History     Socioeconomic History    Marital status: /Civil Union     Spouse name: Adrien     Number of children: 2    Years of education:  Not on file    Highest education level: Not on file   Occupational History    Not on file   Tobacco Use    Smoking status: Never    Smokeless tobacco: Never   Vaping Use    Vaping status: Never Used   Substance and Sexual Activity    Alcohol use: No    Drug use: No    Sexual activity: Yes     Partners: Male     Birth control/protection: None   Other Topics Concern    Not on file   Social History Narrative    Who lives in your home: Spouse and children     What type of home do you live in: Single house    Age of your home: Bit 30 yrs ago     How long have you been living there: 2019    Type of heat: Baseboard    Type of fuel: Gas    What type of kevin is in your bedroom: Carpet    Do you have the following in or near your home:    Air products: Central air    Pests: None    Pets: None    Are pets allowed in bedroom: N/A    Open fields, wooded areas nearby: N/A    Basement: Wet, Dry, Damp, Musty and Unfinished    Exposure to second hand smoke: No        Habits:    Caffeine: coffee 1 cup daily-hot tea 1 cup daily     Chocolate: daily     Other:     Social Determinants of Health     Financial Resource Strain: Low Risk  (3/28/2024)    Overall Financial Resource Strain (CARDIA)     Difficulty of Paying Living Expenses: Not hard at all   Food Insecurity: No Food Insecurity (3/28/2024)    Hunger Vital Sign     Worried About Running Out of Food in the Last Year: Never true     Ran Out of Food in the Last Year: Never true   Transportation Needs: No Transportation Needs (3/28/2024)    PRAPARE - Transportation     Lack of Transportation (Medical): No     Lack of Transportation (Non-Medical): No   Physical Activity: Inactive (7/15/2021)    Exercise Vital Sign     Days of Exercise per Week: 0 days     Minutes of Exercise per Session: 0 min   Stress: No Stress Concern Present (12/23/2021)    [employer] [employer] - Occupational Stress Questionnaire     Feeling of Stress : Not at all   Social Connections:  "Moderately Isolated (12/23/2021)    Social Connection and Isolation Panel [NHANES]     Frequency of Communication with Friends and Family: More than three times a week     Frequency of Social Gatherings with Friends and Family: More than three times a week     Attends Temple Services: Never     Active Member of Clubs or Organizations: No     Attends Club or Organization Meetings: Never     Marital Status:    Intimate Partner Violence: Not At Risk (12/23/2021)    Humiliation, Afraid, Rape, and Kick questionnaire     Fear of Current or Ex-Partner: No     Emotionally Abused: No     Physically Abused: No     Sexually Abused: No   Housing Stability: Low Risk  (3/28/2024)    Housing Stability Vital Sign     Unable to Pay for Housing in the Last Year: No     Number of Times Moved in the Last Year: 1     Homeless in the Last Year: No     Scheduled Meds:  Continuous Infusions:No current facility-administered medications for this visit.    PRN Meds:.  No Known Allergies    Physical Examination:    /70   Pulse 90   Ht 5' 5\" (1.651 m)   Wt 69.9 kg (154 lb)   BMI 25.63 kg/m²     Gen: A&Ox3, NAD  Cardiac: regular rate  Chest: non labored breathing  Abdomen: Non-distended    Right Upper Extremity:  Skin CDI  No obvious deformity of the shoulder, arm, elbow, forearm, wrist, hand  Composite fist  Sensation intact to light touch in the axillary median, ulnar, and radial nerve distributions   motor intact  2+RP  Non tender small masses noted Radial volar wrist, each approximately 6 mm in diameter and adjacent to each other, firm     Left Upper Extremity:  Skin CDI  No obvious deformity of the shoulder, arm, elbow, forearm, wrist, hand  Sensation intact to light touch in the axillary median, ulnar, and radial nerve distributions  motor intact  2+RP      Studies:  No imaging to review      Assessment and Plan:  1. Ganglion of right wrist            33 y.o. female presents in follow up for the above diagnosis. She " notes intermittent continued pain and swelling in the right wrist since the last visit. We reviewed management options going forward including continued bracing and observation, or surgical excision of the cysts. We also discussed that she may have some irritation of the overlying tendons, and we could consider possible CSI at the first dorsal compartment for her pain if needed. At this point, she would like to continue with bracing and activity modifications. She will consider future injections or surgery as needed if she has increased pain or if the cysts increase in size. It is recommended she return to the office as needed if the problem fails to improve, worsens, or recurs.    she expressed understanding of the plan and agreed. We encouraged them to contact our office with any questions or concerns.       Doroteo Boggs MD  Hand and Upper Extremity Surgery      *This note was dictated using Dragon voice recognition software. Please excuse any word substitutions or errors.*

## 2024-08-27 ENCOUNTER — OFFICE VISIT (OUTPATIENT)
Dept: INTERNAL MEDICINE CLINIC | Facility: CLINIC | Age: 34
End: 2024-08-27

## 2024-08-27 VITALS
SYSTOLIC BLOOD PRESSURE: 103 MMHG | WEIGHT: 159.5 LBS | TEMPERATURE: 98 F | DIASTOLIC BLOOD PRESSURE: 67 MMHG | BODY MASS INDEX: 27.23 KG/M2 | HEART RATE: 90 BPM | HEIGHT: 64 IN

## 2024-08-27 DIAGNOSIS — M54.9 UPPER BACK PAIN ON RIGHT SIDE: ICD-10-CM

## 2024-08-27 DIAGNOSIS — D64.9 LOW HEMOGLOBIN: ICD-10-CM

## 2024-08-27 DIAGNOSIS — E78.49 OTHER HYPERLIPIDEMIA: ICD-10-CM

## 2024-08-27 DIAGNOSIS — M54.2 CERVICALGIA: ICD-10-CM

## 2024-08-27 DIAGNOSIS — R63.2 INCREASED APPETITE: ICD-10-CM

## 2024-08-27 DIAGNOSIS — R63.5 WEIGHT GAIN: Primary | ICD-10-CM

## 2024-08-27 PROCEDURE — 99214 OFFICE O/P EST MOD 30 MIN: CPT | Performed by: FAMILY MEDICINE

## 2024-08-27 NOTE — PROGRESS NOTES
Ambulatory Visit  Name: Radha Abbasi      : 1990      MRN: [id number]  Encounter Provider: Crystal Johnson MD  Encounter Date: 2024   Encounter department: [facility]    Assessment & Plan   1. Weight gain  Assessment & Plan:  See detailed HPI , pt has gained weight and had increased appetite over the last 3-4 months , she had a baby in 2022 weights pre pregnancy 20 162 lbs , 3/3/2021 162 lbs , 23 142 lbs   This office visit weight 159 lbs   She is admittedly not eating a healthy diet , if she doesn't eat a meal she feels dizziness , blurry vision , increased thirst , she craves sweets   She is drinking water 2 1/2 litres daily She walks 5,000 steps up to 5 x per week . We reviewed importance pf eating healthily , limiting bad carbs and fats increasing fruits and veggies , she needs to exercise more . Will check labs tsh , cbc , cmp , lipid , hgba1c , follow up here   Orders:  -     Hemoglobin A1C; Future  -     TSH, 3rd generation with Free T4 reflex; Future  2. Increased appetite  3. Low hemoglobin  -     CBC and differential; Future  4. Cervicalgia  -     Ambulatory Referral to Chiropractic; Future  5. Other hyperlipidemia  -     Comprehensive metabolic panel; Future  -     Lipid panel; Future  6. Upper back pain on right side  Assessment & Plan:  See HPI R upper back , R neck pain stabbing feeling sx x 3-4 yrs , lately worse no injury - change in routine + R handed , bothers her when cooking stirring pots on stove , can bother her at night turning in bed , lv hinds , moist heat , massage all do help temporarily   Recommend she avoid offending positions actions , continue ,moist heat applications , Lv hinds and massage , chiropractic referral placed   Orders:  -     Ambulatory Referral to Chiropractic; Future         History of Present Illness     HPI Pt here with Brother , complaining of increased appetite weight gain , interpretor #  [id number] present  during office visit pt had a baby 6/18/2022 , weight OV 9/3/20 159 lbs , 3/3/21 162 lbs , 8/11/23 142 lbs   Pt notes increase in appetite last 3-4 months if she doesn't eat she has dizziness blurry vision , she feels dry mouth needs to drink water frequently . If she doesn't eat dinner and go to bed she continues to feel dizzy   Yesterday she ate the following breakfast , bagel  with cream  cheese and tea , not usual , tomatoes olives cucumbers in am , lunch iced coffee no food , dinner rice meat salad veggies water , had snack after dinner  milk chocolate a fair sized piece , she would like to have this every night She has been craving sweets She is drinking water , 2 1/2 litres daily adds ,lemon juice   She walks 5000 steps up to 5 days per week   Hands feel tight and numb in am , legs do too discomfort , she had this last few months of her pregnancy   Menses are normal   R upper back Neck pain stabbing this  going on for 3-4 years , lately feels worse , no injury no change in routine + R handed ,when cooking and stirring the pot can get pain , using her arm hand too long she can get the pain , it can bother her at night if she over did it during the day   No radiation down R arm , she has been she tries to move her arms , has applied lv hinds , moist heat helps    has massaged the areas , this does help for short term   Review of Systems   Constitutional:  Positive for appetite change and unexpected weight change. Negative for chills and fever.   HENT:  Negative for ear pain and sore throat.    Eyes:  Negative for pain and visual disturbance.   Respiratory:  Negative for cough and shortness of breath.    Cardiovascular:  Negative for chest pain and palpitations.   Gastrointestinal:  Negative for abdominal pain and vomiting.   Genitourinary:  Negative for dysuria and hematuria.   Musculoskeletal:  Negative for arthralgias and back pain.   Skin:  Negative for color change and rash.   Neurological:  Negative  for seizures and syncope.   All other systems reviewed and are negative.    Past Medical History:   Diagnosis Date   • Maternal excessive weight gain     57 lbs   • Pruritic urticarial papules and plaques of pregnancy (puppp) 2017   • Varicella w/o complication childhood    unsure   • Wears glasses      Past Surgical History:   Procedure Laterality Date   • [place]  DELIVERY ONLY N/A 2017    Procedure:  SECTION ();  Surgeon: Maty Peace MD;  Location: [facility] ;  Service: Obstetrics   • [place]  DELIVERY ONLY N/A 2020    Procedure:  SECTION () REPEAT;  Surgeon: Robe Escobedo MD;  Location: [facility];  Service: Obstetrics   • WISDOM TOOTH EXTRACTION       Family History   Problem Relation Age of Onset   • Hyperlipidemia Mother    • Hypertension Mother    • Arthritis Mother    • Diabetes type II Mother    • Hyperlipidemia Father    • Diabetes type II Sister    • Diabetes type II Sister    • No Known Problems Sister    • Seizures Brother    • No Known Problems Brother    • No Known Problems Brother    • No Known Problems Brother    • No Known Problems Brother    • No Known Problems Maternal Grandmother    • Lung cancer Maternal Grandfather    • No Known Problems Paternal Grandmother    • Lung cancer Paternal Grandfather    • No Known Problems Son    • No Known Problems Son      Social History     Tobacco Use   • Smoking status: Never   • Smokeless tobacco: Never   Vaping Use   • Vaping status: Never Used   Substance and Sexual Activity   • Alcohol use: No   • Drug use: No   • Sexual activity: Yes     Partners: Male     Birth control/protection: None     Current Outpatient Medications on File Prior to Visit   Medication Sig   • Calcium Carbonate-Vitamin D (CALCIUM 500/D PO) Take by mouth   • cetirizine (ZyrTEC) 10 mg tablet TAKE 1 TABLET BY MOUTH EVERY MORNING FOR 14 DAYS (Patient not taking: Reported on 2024)   • Diclofenac Sodium (VOLTAREN) 1 % Apply  "2 g topically 4 (four) times a day (Patient not taking: Reported on 4/17/2024)   • fluticasone (FLONASE) 50 mcg/act nasal spray 2 SPRAYS INTO EACH NOSTRIL 4 (FOUR) TIMES A DAY AS NEEDED FOR RHINITIS OR ALLERGIES (Patient not taking: Reported on 4/17/2024)   • Multiple Vitamin (MULTIVITAMIN) capsule Take 1 capsule by mouth daily (Patient not taking: Reported on 7/16/2024)   • naproxen (EC NAPROSYN) 375 MG TBEC Take 1 tablet (375 mg total) by mouth 2 (two) times a day with meals for 7 days   • Omega-3 Fatty Acids (FISH OIL BURP-LESS PO) Take by mouth (Patient not taking: Reported on 7/16/2024)   • sertraline (ZOLOFT) 100 mg tablet Take 100 mg by mouth every morning   • sertraline (ZOLOFT) 50 mg tablet Take 50 mg by mouth every evening (Patient not taking: Reported on 7/16/2024)     No Known Allergies  Immunization History   Administered Date(s) Administered   • COVID-19 PFIZER VACCINE 0.3 ML IM 04/22/2021, 05/14/2021   • DTaP 01/12/2017   • Tdap 06/12/2017, 04/15/2020     Objective     /67 (BP Location: Right arm, Patient Position: Sitting, Cuff Size: Standard)   Pulse 90   Temp 98 °F (36.7 °C) (Temporal)   Ht 5' 4\" (1.626 m)   Wt 72.3 kg (159 lb 8 oz)   BMI 27.38 kg/m²     Physical Exam  Vitals and nursing note reviewed.   Constitutional:       General: She is not in acute distress.     Appearance: She is well-developed.      Comments: Skin with good color turgor , well hydrated ,no distress noted  overweight    HENT:      Head: Normocephalic and atraumatic.      Right Ear: No decreased hearing noted.      Left Ear: No decreased hearing noted.   Eyes:      Conjunctiva/sclera: Conjunctivae normal.   Neck:      Thyroid: No thyromegaly.   Cardiovascular:      Rate and Rhythm: Normal rate and regular rhythm.      Heart sounds: Normal heart sounds. No murmur heard.  Pulmonary:      Effort: Pulmonary effort is normal. No respiratory distress.      Breath sounds: Normal breath sounds.   Abdominal:      " Palpations: Abdomen is soft.      Tenderness: There is no abdominal tenderness.   Musculoskeletal:         General: No swelling.      Cervical back: Neck supple. Spasms and tenderness present. Decreased range of motion.      Comments: + spasm R paracerv , trap mm    Lymphadenopathy:      Cervical:      Right cervical: No superficial cervical adenopathy.     Left cervical: No superficial cervical adenopathy.   Skin:     General: Skin is warm and dry.      Capillary Refill: Capillary refill takes less than 2 seconds.      Comments: Non focal  exam    Neurological:      Mental Status: She is alert.   Psychiatric:         Mood and Affect: Mood normal.

## 2024-09-03 PROBLEM — Z3A.38 38 WEEKS GESTATION OF PREGNANCY: Status: RESOLVED | Noted: 2019-12-16 | Resolved: 2024-09-03

## 2024-09-03 NOTE — ASSESSMENT & PLAN NOTE
See HPI R upper back , R neck pain stabbing feeling sx x 3-4 yrs , lately worse no injury - change in routine + R handed , bothers her when cooking stirring pots on stove , can bother her at night turning in bed , lv hinds , moist heat , massage all do help temporarily   Recommend she avoid offending positions actions , continue ,moist heat applications , Lv hinds and massage , chiropractic referral placed

## 2024-09-03 NOTE — ASSESSMENT & PLAN NOTE
See detailed HPI , pt has gained weight and had increased appetite over the last 3-4 months , she had a baby in 6/2022 weights pre pregnancy 9/30/20 162 lbs , 3/3/2021 162 lbs , 8/11/23 142 lbs   This office visit weight 159 lbs   She is admittedly not eating a healthy diet , if she doesn't eat a meal she feels dizziness , blurry vision , increased thirst , she craves sweets   She is drinking water 2 1/2 litres daily She walks 5,000 steps up to 5 x per week . We reviewed importance pf eating healthily , limiting bad carbs and fats increasing fruits and veggies , she needs to exercise more . Will check labs tsh , cbc , cmp , lipid , hgba1c , follow up here

## 2024-09-04 ENCOUNTER — TELEPHONE (OUTPATIENT)
Dept: DENTISTRY | Facility: CLINIC | Age: 34
End: 2024-09-04

## 2024-09-04 NOTE — TELEPHONE ENCOUNTER
Called patient to asker her if she still wishes to continue care with us patient has not being seen since 2022.

## 2024-09-05 ENCOUNTER — TELEPHONE (OUTPATIENT)
Age: 34
End: 2024-09-05

## 2024-09-20 ENCOUNTER — CONSULT (OUTPATIENT)
Age: 34
End: 2024-09-20
Payer: COMMERCIAL

## 2024-09-20 VITALS
HEIGHT: 65 IN | WEIGHT: 159 LBS | SYSTOLIC BLOOD PRESSURE: 106 MMHG | DIASTOLIC BLOOD PRESSURE: 58 MMHG | HEART RATE: 72 BPM | OXYGEN SATURATION: 98 % | BODY MASS INDEX: 26.49 KG/M2

## 2024-09-20 DIAGNOSIS — M54.16 LUMBAR RADICULOPATHY: ICD-10-CM

## 2024-09-20 DIAGNOSIS — M54.9 UPPER BACK PAIN ON RIGHT SIDE: ICD-10-CM

## 2024-09-20 DIAGNOSIS — M99.02 SEGMENTAL DYSFUNCTION OF THORACIC REGION: ICD-10-CM

## 2024-09-20 DIAGNOSIS — M99.03 SEGMENTAL DYSFUNCTION OF LUMBAR REGION: ICD-10-CM

## 2024-09-20 DIAGNOSIS — M99.01 SEGMENTAL DYSFUNCTION OF CERVICAL REGION: Primary | ICD-10-CM

## 2024-09-20 DIAGNOSIS — M54.2 CERVICALGIA: ICD-10-CM

## 2024-09-20 PROCEDURE — 98941 CHIROPRACT MANJ 3-4 REGIONS: CPT | Performed by: CHIROPRACTOR

## 2024-09-20 PROCEDURE — 99243 OFF/OP CNSLTJ NEW/EST LOW 30: CPT | Performed by: CHIROPRACTOR

## 2024-09-20 NOTE — PROGRESS NOTES
Diagnoses and all orders for this visit:    Segmental dysfunction of cervical region    Cervicalgia  -     Ambulatory Referral to Chiropractic    Upper back pain on right side  -     Ambulatory Referral to Chiropractic    Segmental dysfunction of thoracic region    Segmental dysfunction of lumbar region    Lumbar radiculopathy  -     XR spine lumbar minimum 4 views non injury; Future      ASSESSMENT:  No red flags, radiculopathy or neurologic deficit appreciated clinically. Pt's symptoms and exam findings consistent with mechanical neck/ubp  and lumbar radiculopathy secondary to repetitive st/sp injury, exacerbated by postural/ergonomic stressors. Pt responded well to stretches and manual mobilization of the affected spinal and myofascial tissues with increased ROM; trial of conservative tx recommended consisting of stretching, ther-ex, graded mobilization/manipulation of the affected spinal/myofascial tissues, postural/ergonomic education and take home stretches/exercises. If symptoms fail to improve with short trial of conservative care, appropriate imaging and referral will be coordinated. Xrays are ordered to evaluate degenerative changes.   Spent greater than 30 min c pt discussing hx, pe, ddx, tx options and reviewing notes/imaging    PROCEDURE CODES: 03534, 82790    TREATMENT:  Fear avoidance behavior discussion, encouraged and reassured pt that natural course of condition is to improve over time with adherence to tx plan and home care strategies. Home care recommendations: avoid bed rest, walk (but avoid trails and uneven surfaces), gradual return to activity to tolerance (avoid anything that peripheralizes symptoms), ice 20 min on/off, watch for ice burn, call if symptoms peripheralize, worsen, or neurologic deficit progresses. Ther-ex: IASTM - discussed post procedure soreness and/or ecchymosis for up to 36 hrs, applied to affected mm hypertonicities; wall dom, axial retraction, upper trap stretch, lev  scap stretch, SCM stretch, lat rows with t-band, lat pull down with t-band, abdominal bracing; greater than 15 min spent performing above mentioned ther-ex. Thoracic mobilization/manipulation: prone P-A mob, ; cervical mobilization/manipulation: diversified supine graded mobilization, cervical traction, prone C/T jct HVLA    HPI:  Radha Abbasi is a 33 y.o. female   Chief Complaint   Patient presents with    Neck Pain     Neck pain-8    Back Pain     Lower back pain-8    Sciatica     The patient presents to the office with neck pain and lower back pain. The patient reports that she is feeling pain mainly on the R side. The patient reports lower back is the area she would like to work on first but the pain is the same. Pain is about a 3/10. The patient reports 5-6 months in the lower back, worse in the morning when getting up to get the kids pain. The patient will only take Tylenol when pain is the worst but not often. R leg numbness. The patient went to PCP, Dr. Crystal Johnson MD and she referred her to our office. The patient has had no previous treatment. No images. The patient reports when its bad at night lying down will help it.     Neck Pain   Pertinent negatives include no chest pain, fever, headaches, numbness or weakness.   Back Pain  Pertinent negatives include no chest pain, fever, headaches, numbness or weakness.     Past Medical History:   Diagnosis Date    Maternal excessive weight gain 2017    57 lbs    Pruritic urticarial papules and plaques of pregnancy (puppp) 07/2017    Varicella w/o complication childhood    unsure    Wears glasses       The following portions of the patient's history were reviewed and updated as appropriate: allergies, past family history, past medical history, past social history, past surgical history, and problem list.  Review of Systems   Constitutional:  Negative for activity change, fatigue, fever and unexpected weight change.   HENT:  Negative for ear pain, hearing  loss, sinus pressure, sinus pain, sore throat and tinnitus.    Respiratory:  Negative for chest tightness, shortness of breath, wheezing and stridor.    Cardiovascular:  Negative for chest pain.   Genitourinary:  Negative for flank pain and frequency.   Musculoskeletal:  Positive for back pain, myalgias, neck pain and neck stiffness. Negative for joint swelling.   Skin:  Negative for color change and pallor.   Neurological:  Negative for dizziness, speech difficulty, weakness, numbness and headaches.   Psychiatric/Behavioral:  Negative for agitation and sleep disturbance. The patient is not nervous/anxious.      Physical Exam  Constitutional:       General: She is not in acute distress.     Appearance: Normal appearance.   HENT:      Head: Normocephalic.      Mouth/Throat:      Mouth: Mucous membranes are moist.   Eyes:      Extraocular Movements: Extraocular movements intact.      Conjunctiva/sclera: Conjunctivae normal.      Pupils: Pupils are equal, round, and reactive to light.   Neck:      Vascular: No carotid bruit.   Pulmonary:      Effort: Pulmonary effort is normal.   Chest:      Chest wall: No tenderness.   Abdominal:      General: Abdomen is flat.      Palpations: Abdomen is soft.   Musculoskeletal:         General: Tenderness present. No swelling, deformity or signs of injury. Normal range of motion.        Arms:       Cervical back: Normal range of motion. Tenderness present. No rigidity.      Right lower leg: No edema.      Left lower leg: No edema.        Legs:    Lymphadenopathy:      Cervical: No cervical adenopathy.   Skin:     General: Skin is warm.      Coloration: Skin is not jaundiced or pale.      Findings: No bruising or erythema.   Neurological:      Mental Status: She is alert and oriented to person, place, and time.      Cranial Nerves: No cranial nerve deficit.      Sensory: No sensory deficit.      Motor: No weakness.      Gait: Gait is intact.      Deep Tendon Reflexes: Reflexes are  normal and symmetric.   Psychiatric:         Attention and Perception: Attention normal.         Mood and Affect: Mood and affect normal.         Speech: Speech normal.         Behavior: Behavior normal. Behavior is cooperative.         Thought Content: Thought content normal.         Cognition and Memory: Cognition normal.         Judgment: Judgment normal.       SOFT TISSUE ASSESSMENT: Hypertonicity and tenderness palpated C5-T6 erector spinae, upper traps, lev scap, SCM, scalene, rhomboid, suboccipitals L2-S1, QL, piriformis, paraspinals,  JOINT RECTRICTIONS: C5-T2, T6, L4-S1 ORTHO: Valorie unremarkable for centralization/peripheralization; max foraminal comp elicits local np R/L; shoulder depression elicits stiffness in R/L upper trap; brachial plexus tension test elicits neural tension in R/L UE; cervical distraction elicits relieves CC, Sitting ROOT- some neural tension on R, SLUMP - pos neural tension- mild    Return in about 1 week (around 9/27/2024) for Recheck.

## 2024-09-25 ENCOUNTER — APPOINTMENT (OUTPATIENT)
Dept: LAB | Facility: CLINIC | Age: 34
End: 2024-09-25
Payer: COMMERCIAL

## 2024-09-25 DIAGNOSIS — R63.5 WEIGHT GAIN: ICD-10-CM

## 2024-09-25 DIAGNOSIS — E78.49 OTHER HYPERLIPIDEMIA: ICD-10-CM

## 2024-09-25 DIAGNOSIS — D64.9 LOW HEMOGLOBIN: ICD-10-CM

## 2024-09-25 LAB
ALBUMIN SERPL BCG-MCNC: 4.3 G/DL (ref 3.5–5)
ALP SERPL-CCNC: 54 U/L (ref 34–104)
ALT SERPL W P-5'-P-CCNC: 13 U/L (ref 7–52)
ANION GAP SERPL CALCULATED.3IONS-SCNC: 6 MMOL/L (ref 4–13)
AST SERPL W P-5'-P-CCNC: 13 U/L (ref 13–39)
BASOPHILS # BLD AUTO: 0.07 THOUSANDS/ΜL (ref 0–0.1)
BASOPHILS NFR BLD AUTO: 1 % (ref 0–1)
BILIRUB SERPL-MCNC: 0.45 MG/DL (ref 0.2–1)
BUN SERPL-MCNC: 11 MG/DL (ref 5–25)
CALCIUM SERPL-MCNC: 9.2 MG/DL (ref 8.4–10.2)
CHLORIDE SERPL-SCNC: 106 MMOL/L (ref 96–108)
CHOLEST SERPL-MCNC: 192 MG/DL
CO2 SERPL-SCNC: 26 MMOL/L (ref 21–32)
CREAT SERPL-MCNC: 0.61 MG/DL (ref 0.6–1.3)
EOSINOPHIL # BLD AUTO: 0.42 THOUSAND/ΜL (ref 0–0.61)
EOSINOPHIL NFR BLD AUTO: 6 % (ref 0–6)
ERYTHROCYTE [DISTWIDTH] IN BLOOD BY AUTOMATED COUNT: 11.9 % (ref 11.6–15.1)
EST. AVERAGE GLUCOSE BLD GHB EST-MCNC: 105 MG/DL
GFR SERPL CREATININE-BSD FRML MDRD: 117 ML/MIN/1.73SQ M
GLUCOSE P FAST SERPL-MCNC: 87 MG/DL (ref 65–99)
HBA1C MFR BLD: 5.3 %
HCT VFR BLD AUTO: 39.9 % (ref 34.8–46.1)
HDLC SERPL-MCNC: 38 MG/DL
HGB BLD-MCNC: 13 G/DL (ref 11.5–15.4)
IMM GRANULOCYTES # BLD AUTO: 0.01 THOUSAND/UL (ref 0–0.2)
IMM GRANULOCYTES NFR BLD AUTO: 0 % (ref 0–2)
LDLC SERPL CALC-MCNC: 132 MG/DL (ref 0–100)
LYMPHOCYTES # BLD AUTO: 2.1 THOUSANDS/ΜL (ref 0.6–4.47)
LYMPHOCYTES NFR BLD AUTO: 32 % (ref 14–44)
MCH RBC QN AUTO: 29.1 PG (ref 26.8–34.3)
MCHC RBC AUTO-ENTMCNC: 32.6 G/DL (ref 31.4–37.4)
MCV RBC AUTO: 90 FL (ref 82–98)
MONOCYTES # BLD AUTO: 0.48 THOUSAND/ΜL (ref 0.17–1.22)
MONOCYTES NFR BLD AUTO: 7 % (ref 4–12)
NEUTROPHILS # BLD AUTO: 3.55 THOUSANDS/ΜL (ref 1.85–7.62)
NEUTS SEG NFR BLD AUTO: 54 % (ref 43–75)
NONHDLC SERPL-MCNC: 154 MG/DL
NRBC BLD AUTO-RTO: 0 /100 WBCS
PLATELET # BLD AUTO: 223 THOUSANDS/UL (ref 149–390)
PMV BLD AUTO: 9.8 FL (ref 8.9–12.7)
POTASSIUM SERPL-SCNC: 4.5 MMOL/L (ref 3.5–5.3)
PROT SERPL-MCNC: 7.1 G/DL (ref 6.4–8.4)
RBC # BLD AUTO: 4.46 MILLION/UL (ref 3.81–5.12)
SODIUM SERPL-SCNC: 138 MMOL/L (ref 135–147)
TRIGL SERPL-MCNC: 112 MG/DL
TSH SERPL DL<=0.05 MIU/L-ACNC: 0.69 UIU/ML (ref 0.45–4.5)
WBC # BLD AUTO: 6.63 THOUSAND/UL (ref 4.31–10.16)

## 2024-09-25 PROCEDURE — 85025 COMPLETE CBC W/AUTO DIFF WBC: CPT

## 2024-09-25 PROCEDURE — 80053 COMPREHEN METABOLIC PANEL: CPT

## 2024-09-25 PROCEDURE — 83036 HEMOGLOBIN GLYCOSYLATED A1C: CPT

## 2024-09-25 PROCEDURE — 36415 COLL VENOUS BLD VENIPUNCTURE: CPT

## 2024-09-25 PROCEDURE — 80061 LIPID PANEL: CPT

## 2024-09-25 PROCEDURE — 84443 ASSAY THYROID STIM HORMONE: CPT

## 2024-09-30 ENCOUNTER — PROCEDURE VISIT (OUTPATIENT)
Age: 34
End: 2024-09-30
Payer: COMMERCIAL

## 2024-09-30 VITALS
WEIGHT: 159 LBS | HEART RATE: 75 BPM | DIASTOLIC BLOOD PRESSURE: 56 MMHG | BODY MASS INDEX: 27.14 KG/M2 | HEIGHT: 64 IN | OXYGEN SATURATION: 98 % | SYSTOLIC BLOOD PRESSURE: 100 MMHG

## 2024-09-30 DIAGNOSIS — M54.16 LUMBAR RADICULOPATHY: ICD-10-CM

## 2024-09-30 DIAGNOSIS — M54.2 CERVICALGIA: Primary | ICD-10-CM

## 2024-09-30 DIAGNOSIS — M99.02 SEGMENTAL DYSFUNCTION OF THORACIC REGION: ICD-10-CM

## 2024-09-30 DIAGNOSIS — M54.9 UPPER BACK PAIN ON RIGHT SIDE: ICD-10-CM

## 2024-09-30 DIAGNOSIS — M99.03 SEGMENTAL DYSFUNCTION OF LUMBAR REGION: ICD-10-CM

## 2024-09-30 DIAGNOSIS — M99.01 SEGMENTAL DYSFUNCTION OF CERVICAL REGION: ICD-10-CM

## 2024-09-30 PROCEDURE — 98941 CHIROPRACT MANJ 3-4 REGIONS: CPT | Performed by: CHIROPRACTOR

## 2024-09-30 NOTE — PROGRESS NOTES
Diagnoses and all orders for this visit:    Cervicalgia    Upper back pain on right side    Segmental dysfunction of cervical region    Segmental dysfunction of thoracic region    Segmental dysfunction of lumbar region    Lumbar radiculopathy      ASSESSMENT:  Pt's symptoms and exam findings consistent with mechanical neck/ubp  and lumbar radiculopathy secondary to repetitive st/sp injury, exacerbated by postural/ergonomic stressors. Pt responded well to stretches and manual mobilization of the affected spinal and myofascial tissues with increased ROM; trial of conservative tx recommended consisting of stretching, ther-ex, graded mobilization/manipulation of the affected spinal/myofascial tissues, postural/ergonomic education and take home stretches/exercises. If symptoms fail to improve with short trial of conservative care, appropriate imaging and referral will be coordinated. Xrays are ordered to evaluate degenerative changes.   - The pt tolerated treatment well but still numbness, referred to pain management for consult since Hx of Herniation    PROCEDURE CODES: 10444    TREATMENT:  Fear avoidance behavior discussion, encouraged and reassured pt that natural course of condition is to improve over time with adherence to tx plan and home care strategies. Home care recommendations: avoid bed rest, walk (but avoid trails and uneven surfaces), gradual return to activity to tolerance (avoid anything that peripheralizes symptoms), ice 20 min on/off, watch for ice burn, call if symptoms peripheralize, worsen, or neurologic deficit progresses. Ther-ex: IASTM - discussed post procedure soreness and/or ecchymosis for up to 36 hrs, applied to affected mm hypertonicities; wall dom, axial retraction, upper trap stretch, lev scap stretch, SCM stretch, lat rows with t-band, lat pull down with t-band, abdominal bracing; greater than 15 min spent performing above mentioned ther-ex. Thoracic mobilization/manipulation: prone P-A  mob; cervical mobilization/manipulation: diversified supine graded mobilization, cervical traction, prone C/T jct HVLA    HPI:  Radha Abbasi is a 33 y.o. female   Chief Complaint   Patient presents with    Neck Pain     Neck pain-3    Back Pain     Lower back pain-3    Sciatica     The patient presents to the office with neck pain and lower back pain. The patient reports that she is feeling pain mainly on the R side. The patient reports lower back is the area she would like to work on first but the pain is the same. Pain is about a 3/10. The patient reports 5-6 months in the lower back, worse in the morning when getting up to get the kids pain. The patient will only take Tylenol when pain is the worst but not often. R leg numbness. The patient went to PCP, Dr. Crystal Johnson MD and she referred her to our office. The patient has had no previous treatment. No images. The patient reports when its bad at night lying down will help it.   9/30- The patient reports he numbness in the leg is still bothering her the most, the patient has increased numbness with sleeping longer and cleaning the house increases lower back pain. She also has some neck pain.     Neck Pain     Back Pain      Past Medical History:   Diagnosis Date    Maternal excessive weight gain 2017    57 lbs    Pruritic urticarial papules and plaques of pregnancy (puppp) 07/2017    Varicella w/o complication childhood    unsure    Wears glasses       The following portions of the patient's history were reviewed and updated as appropriate: allergies, past family history, past medical history, past social history, past surgical history, and problem list.  Review of Systems   Musculoskeletal:  Positive for back pain, myalgias, neck pain and neck stiffness.     Physical Exam  Constitutional:       Appearance: Normal appearance.   Musculoskeletal:         General: Tenderness present. Normal range of motion.        Arms:       Cervical back: Normal range of  motion. Tenderness present.        Legs:    Neurological:      Mental Status: She is alert and oriented to person, place, and time.      Gait: Gait is intact.      Deep Tendon Reflexes: Reflexes are normal and symmetric.   Psychiatric:         Attention and Perception: Attention normal.         Mood and Affect: Affect normal.         Speech: Speech normal.         Behavior: Behavior is cooperative.         Cognition and Memory: Cognition normal.     SOFT TISSUE ASSESSMENT: Hypertonicity and tenderness palpated C5-T6 erector spinae, upper traps, lev scap, SCM, scalene, rhomboid, suboccipitals L2-S1, QL, piriformis, paraspinals,  JOINT RECTRICTIONS: C5-T2, T6, L4-S1 ORTHO: Valorie unremarkable for centralization/peripheralization; max foraminal comp elicits local np R/L; shoulder depression elicits stiffness in R/L upper trap; brachial plexus tension test elicits neural tension in R/L UE; cervical distraction elicits relieves CC, Sitting ROOT- some neural tension on R, SLUMP - pos neural tension- mild    Return in about 1 week (around 10/7/2024) for Recheck.

## 2024-10-16 ENCOUNTER — OFFICE VISIT (OUTPATIENT)
Dept: INTERNAL MEDICINE CLINIC | Facility: CLINIC | Age: 34
End: 2024-10-16

## 2024-10-16 ENCOUNTER — TELEPHONE (OUTPATIENT)
Age: 34
End: 2024-10-16

## 2024-10-16 VITALS
WEIGHT: 164.4 LBS | HEART RATE: 83 BPM | OXYGEN SATURATION: 98 % | TEMPERATURE: 98.4 F | BODY MASS INDEX: 28.22 KG/M2 | DIASTOLIC BLOOD PRESSURE: 67 MMHG | SYSTOLIC BLOOD PRESSURE: 121 MMHG

## 2024-10-16 DIAGNOSIS — J35.8 TONSILLITH: ICD-10-CM

## 2024-10-16 DIAGNOSIS — E78.49 OTHER HYPERLIPIDEMIA: ICD-10-CM

## 2024-10-16 DIAGNOSIS — J35.8 TONSILLAR EXUDATE: ICD-10-CM

## 2024-10-16 DIAGNOSIS — M54.2 CERVICALGIA: Primary | ICD-10-CM

## 2024-10-16 DIAGNOSIS — J35.8 TONSIL ASYMMETRY: ICD-10-CM

## 2024-10-16 DIAGNOSIS — M67.431 GANGLION OF RIGHT WRIST: ICD-10-CM

## 2024-10-16 PROCEDURE — 87070 CULTURE OTHR SPECIMN AEROBIC: CPT | Performed by: FAMILY MEDICINE

## 2024-10-16 NOTE — ASSESSMENT & PLAN NOTE
Pt has been trying to avoid offending positions actions and is opting to observe , she will see orthopedist in follow up

## 2024-10-16 NOTE — TELEPHONE ENCOUNTER
Just an FYI    Pt lives in [place] but was scheduled with Dr. Rock in [place] because she only wants to see women care providers

## 2024-10-16 NOTE — PROGRESS NOTES
Ambulatory Visit  Name: Radha Abbasi      : 1990      MRN: [id number]  Encounter Provider: Crystal Johnson MD  Encounter Date: 10/16/2024   Encounter department: [facility]    Assessment & Plan  Cervicalgia  Patient is in the midst of attending chiropractic tx , she will have 6 sessions , she is doing home exercises and stretches , so far she is not feeling any better , chiropractor suggested she see pain management if this treatment is not helpful , in the meantime she will continue local care , avoid offending positions actions , moist heat applications liberally       Other hyperlipidemia         Ganglion of right wrist  Pt has been trying to avoid offending positions actions and is opting to observe , she will see orthopedist in follow up        Tonsil asymmetry    Orders:    Ambulatory Referral to Otolaryngology; Future    Tonsillith     Orders:    Ambulatory Referral to Otolaryngology; Future    Throat culture; Future    Throat culture    Tonsillar exudate   Pt has noted white material on R tonsil over the last few months , - pain - fever - dental or nasal associated sx , she gargles with salt water , some days it isn't there , on exam doesn't appear to be tonsil stones , look like exudate , throat cx taken , recommend continued local care and ENT evaluation             History of Present Illness     HPI Pt here to review labs , CMP nl , cbc nl , tsh nl , chol 192 , hdl 38 , ldl 132 chiropractic treatment for cervicalgia interpretor  Lyndsey #  [id number] present during office visit   Pt saw chiropractor , she attended x 2 weeks , will complete 6 weeks so far she  has not noted difference as yet , she is doing home exercises , chiro feels pt  pt should see pain management Regarding ganglion cyst when she uses her hand wrist more the cyst is more prominent , but it is not really bothering her     Pt has noted white spots in throat , saw this a few months ago , has  spots then they go away - pain - swallowing difficulty  , she has not had this before , she has just rinsed area with salt water , R side only , no fever no nasal or dental sx   Review of Systems   Constitutional:  Negative for chills and fever.   HENT:  Negative for ear pain, mouth sores, postnasal drip, rhinorrhea and sore throat.         White spots R tonsil    Eyes:  Negative for pain and visual disturbance.   Respiratory:  Negative for cough and shortness of breath.    Cardiovascular:  Negative for chest pain and palpitations.   Gastrointestinal:  Negative for abdominal pain and vomiting.   Genitourinary:  Negative for dysuria and hematuria.   Musculoskeletal:  Positive for neck pain. Negative for arthralgias, back pain and neck stiffness.   Skin:  Negative for color change and rash.   Neurological:  Negative for seizures and syncope.   All other systems reviewed and are negative.    Past Medical History:   Diagnosis Date    Maternal excessive weight gain     57 lbs    Pruritic urticarial papules and plaques of pregnancy (puppp) 2017    Varicella w/o complication childhood    unsure    Wears glasses      Past Surgical History:   Procedure Laterality Date    [place]  DELIVERY ONLY N/A 2017    Procedure:  SECTION ();  Surgeon: Maty Peace MD;  Location: [facility] ;  Service: Obstetrics    [place]  DELIVERY ONLY N/A 2020    Procedure:  SECTION () REPEAT;  Surgeon: Robe Escobedo MD;  Location: [facility];  Service: Obstetrics    WISDOM TOOTH EXTRACTION       Family History   Problem Relation Age of Onset    Hyperlipidemia Mother     Hypertension Mother     Arthritis Mother     Diabetes type II Mother     Hyperlipidemia Father     Diabetes type II Sister     Diabetes type II Sister     No Known Problems Sister     Seizures Brother     No Known Problems Brother     No Known Problems Brother     No Known Problems Brother     No Known Problems Brother     No  Known Problems Maternal Grandmother     Lung cancer Maternal Grandfather     No Known Problems Paternal Grandmother     Lung cancer Paternal Grandfather     No Known Problems Son     No Known Problems Son      Social History     Tobacco Use    Smoking status: Never    Smokeless tobacco: Never   Vaping Use    Vaping status: Never Used   Substance and Sexual Activity    Alcohol use: No    Drug use: No    Sexual activity: Yes     Partners: Male     Birth control/protection: None     Current Outpatient Medications on File Prior to Visit   Medication Sig    Calcium Carbonate-Vitamin D (CALCIUM 500/D PO) Take by mouth    cetirizine (ZyrTEC) 10 mg tablet TAKE 1 TABLET BY MOUTH EVERY MORNING FOR 14 DAYS (Patient not taking: Reported on 4/17/2024)    Diclofenac Sodium (VOLTAREN) 1 % Apply 2 g topically 4 (four) times a day (Patient not taking: Reported on 4/17/2024)    fluticasone (FLONASE) 50 mcg/act nasal spray 2 SPRAYS INTO EACH NOSTRIL 4 (FOUR) TIMES A DAY AS NEEDED FOR RHINITIS OR ALLERGIES (Patient not taking: Reported on 4/17/2024)    Multiple Vitamin (MULTIVITAMIN) capsule Take 1 capsule by mouth daily (Patient not taking: Reported on 7/16/2024)    naproxen (EC NAPROSYN) 375 MG TBEC Take 1 tablet (375 mg total) by mouth 2 (two) times a day with meals for 7 days    Omega-3 Fatty Acids (FISH OIL BURP-LESS PO) Take by mouth (Patient not taking: Reported on 7/16/2024)    sertraline (ZOLOFT) 100 mg tablet Take 100 mg by mouth every morning (Patient not taking: Reported on 9/20/2024)    sertraline (ZOLOFT) 50 mg tablet Take 50 mg by mouth every evening (Patient not taking: Reported on 9/30/2024)     No Known Allergies  Immunization History   Administered Date(s) Administered    COVID-19 PFIZER VACCINE 0.3 ML IM 04/22/2021, 05/14/2021    DTaP 01/12/2017    Tdap 06/12/2017, 04/15/2020     Objective     /67 (BP Location: Left arm, Patient Position: Sitting, Cuff Size: Standard)   Pulse 83   Temp 98.4 °F (36.9 °C)  (Temporal)   Wt 74.6 kg (164 lb 6.4 oz)   SpO2 98%   BMI 28.22 kg/m²     Physical Exam  Vitals and nursing note reviewed.   Constitutional:       General: She is not in acute distress.     Appearance: She is well-developed.      Comments: Skin with good color turgor , well hydrated ,no distress noted     HENT:      Head: Normocephalic and atraumatic.      Right Ear: No decreased hearing noted.      Left Ear: No decreased hearing noted.      Mouth/Throat:      Tongue: No lesions.      Pharynx: No pharyngeal swelling, posterior oropharyngeal erythema or postnasal drip.      Tonsils: Tonsillar exudate present. No tonsillar abscesses.      Comments: R tonsil thick white material , doesn't appear to be one piece , but not appearing to be tonsil stones   Eyes:      Conjunctiva/sclera: Conjunctivae normal.   Cardiovascular:      Rate and Rhythm: Normal rate and regular rhythm.      Heart sounds: Normal heart sounds. No murmur heard.  Pulmonary:      Effort: Pulmonary effort is normal. No respiratory distress.      Breath sounds: Normal breath sounds.   Abdominal:      Palpations: Abdomen is soft.      Tenderness: There is no abdominal tenderness.   Musculoskeletal:         General: No swelling.      Cervical back: Neck supple. Spasms and tenderness present. No bony tenderness. Decreased range of motion.   Lymphadenopathy:      Cervical: No cervical adenopathy.      Right cervical: No superficial cervical adenopathy.     Left cervical: No superficial cervical adenopathy.   Skin:     General: Skin is warm and dry.      Capillary Refill: Capillary refill takes less than 2 seconds.   Neurological:      Mental Status: She is alert.   Psychiatric:         Mood and Affect: Mood normal.

## 2024-10-16 NOTE — ASSESSMENT & PLAN NOTE
Patient is in the midst of attending chiropractic tx , she will have 6 sessions , she is doing home exercises and stretches , so far she is not feeling any better , chiropractor suggested she see pain management if this treatment is not helpful , in the meantime she will continue local care , avoid offending positions actions , moist heat applications liberally

## 2024-10-18 PROBLEM — J35.8 TONSILLITH: Status: ACTIVE | Noted: 2024-10-18

## 2024-10-18 LAB — BACTERIA THROAT CULT: NORMAL

## 2024-10-18 NOTE — ASSESSMENT & PLAN NOTE
Pt has noted white material on R tonsil over the last few months , - pain - fever - dental or nasal associated sx , she gargles with salt water , some days it isn't there , on exam doesn't appear to be tonsil stones , look like exudate , throat cx taken , recommend continued local care and ENT evaluation

## 2024-10-18 NOTE — ASSESSMENT & PLAN NOTE
Orders:    Ambulatory Referral to Otolaryngology; Future    Throat culture; Future    Throat culture

## 2024-10-22 NOTE — TELEPHONE ENCOUNTER
Caller: patient spouse    Doctor: Анна Geiger MD    Reason for call: rescheduled FU in [facility]    Call back#:

## 2024-10-31 ENCOUNTER — CONSULT (OUTPATIENT)
Dept: MULTI SPECIALTY CLINIC | Facility: CLINIC | Age: 34
End: 2024-10-31

## 2024-10-31 VITALS
TEMPERATURE: 98.6 F | BODY MASS INDEX: 27.29 KG/M2 | SYSTOLIC BLOOD PRESSURE: 98 MMHG | HEART RATE: 89 BPM | WEIGHT: 159 LBS | DIASTOLIC BLOOD PRESSURE: 63 MMHG

## 2024-10-31 DIAGNOSIS — J35.8 TONSIL ASYMMETRY: ICD-10-CM

## 2024-10-31 DIAGNOSIS — J35.8 TONSILLITH: ICD-10-CM

## 2024-10-31 NOTE — PROGRESS NOTES
[facility]  [facility]  [facility]aryng[facility]          Radha Abbasi is a 33 y.o. who presents with a chief complaint of white spots in tonsils    HPI:    Radha is a 34 y/o female new to the office with the c/o white stuff in tonsils.  She specifically mentions the right tonsil and stated a few months ago, but had more the last 2 weeks. She states they change spots in her throat.  She has mild sore throat, but overall no concerns.  No recurrent tonsillitis or recent infections or fever.  She denies halitosis.  No heartburn. Referred by PCP for evaluation at ENT. Throat culture negative for strep 10/16/2024.    +used [place] translate on google translate    Independent historian:   No Known Allergies  Past Medical History:   Diagnosis Date    Maternal excessive weight gain     57 lbs    Pruritic urticarial papules and plaques of pregnancy (puppp) 2017    Varicella w/o complication childhood    unsure    Wears glasses      Past Surgical History:   Procedure Laterality Date    [place]  DELIVERY ONLY N/A 2017    Procedure:  SECTION ();  Surgeon: Maty Peace MD;  Location: [facility] ;  Service: Obstetrics    [place]  DELIVERY ONLY N/A 2020    Procedure:  SECTION () REPEAT;  Surgeon: Robe Escobedo MD;  Location: [facility] ;  Service: Obstetrics    WISDOM TOOTH EXTRACTION       Family History   Problem Relation Age of Onset    Hyperlipidemia Mother     Hypertension Mother     Arthritis Mother     Diabetes type II Mother     Hyperlipidemia Father     Diabetes type II Sister     Diabetes type II Sister     No Known Problems Sister     Seizures Brother     No Known Problems Brother     No Known Problems Brother     No Known Problems Brother     No Known Problems Brother     No Known Problems Maternal Grandmother     Lung cancer Maternal Grandfather     No Known Problems Paternal Grandmother     Lung cancer Paternal  Grandfather     No Known Problems Son     No Known Problems Son      Current Outpatient Medications on File Prior to Visit   Medication Sig Dispense Refill    Calcium Carbonate-Vitamin D (CALCIUM 500/D PO) Take by mouth      cetirizine (ZyrTEC) 10 mg tablet TAKE 1 TABLET BY MOUTH EVERY MORNING FOR 14 DAYS (Patient not taking: Reported on 4/17/2024)      Diclofenac Sodium (VOLTAREN) 1 % Apply 2 g topically 4 (four) times a day (Patient not taking: Reported on 4/17/2024) 50 g 1    fluticasone (FLONASE) 50 mcg/act nasal spray 2 SPRAYS INTO EACH NOSTRIL 4 (FOUR) TIMES A DAY AS NEEDED FOR RHINITIS OR ALLERGIES (Patient not taking: Reported on 4/17/2024) 16 mL 0    Multiple Vitamin (MULTIVITAMIN) capsule Take 1 capsule by mouth daily (Patient not taking: Reported on 7/16/2024)      naproxen (EC NAPROSYN) 375 MG TBEC Take 1 tablet (375 mg total) by mouth 2 (two) times a day with meals for 7 days 14 tablet 0    Omega-3 Fatty Acids (FISH OIL BURP-LESS PO) Take by mouth (Patient not taking: Reported on 7/16/2024)      sertraline (ZOLOFT) 100 mg tablet Take 100 mg by mouth every morning (Patient not taking: Reported on 9/20/2024)       No current facility-administered medications on file prior to visit.     Social History     Tobacco Use    Smoking status: Never    Smokeless tobacco: Never   Vaping Use    Vaping status: Never Used   Substance Use Topics    Alcohol use: No    Drug use: No       Review of systems ENT ROS: sore throat     Results reviewed; images from any scan have been personally reviewed:        Physical exam:     BP 98/63 (BP Location: Left arm, Patient Position: Sitting, Cuff Size: Large)   Pulse 89   Temp 98.6 °F (37 °C) (Temporal)   Wt 72.1 kg (159 lb)   BMI 27.29 kg/m²     Constitutional:  Well developed, well nourished and groomed, in no acute distress.     Eyes:  Extra-ocular movements intact, pupils equally round and reactive to light and accommodation, the lids and conjunctivae are normal in  appearance.    Head: Atraumatic, normocephalic, no visible scalp lesions, bony palpation unremarkable without stepoffs, parotid and submandibular salivary glands non-tender to palpation and without masses bilaterally.     Ears:  Auricles normal in appearance bilaterally, mastoid prominence non-tender, external auditory canals clear bilaterally, tympanic membranes intact bilaterally without evidence of middle ear effusion or masses, normal appearing ossicles.     Nose/Sinuses:  External appearance unremarkable, no maxillary or frontal sinus tenderness to palpation bilaterally. Anterior rhinoscopy reveals: normal mucosa b/l     Oral Cavity:  Moist mucus membranes, gums and dentition unremarkable, no oral mucosal masses or lesions, floor of mouth soft, tongue mobile without masses or lesions. 2.5+ cryptic tonsils, tonsilliths present bilaterally.  Pt refused me to attempt to remove.    Oropharynx:  Base of tongue soft and without masses, tonsils bilaterally unremarkable, soft palate mucosa unremarkable.     Neck:  No visible or palpable cervical lesions or lymphadenopathy, thyroid gland is normal in size and symmetry and without masses, normal laryngeal elevation with swallowing.     Cardiovascular:  Not examined.  Respiratory:  Normal respiratory effort without evidence of retractions or use of accessory muscles.  Integument:  Normal appearing without observed masses or lesions.  Neurologic:  Cranial nerves II-XII intact bilaterally.  Psychiatric:  Alert and oriented to time, place and person, normal affect.    Procedures      Assessment:   1. Tonsil asymmetry  Ambulatory Referral to Otolaryngology      2. Tonsillith  Ambulatory Referral to Otolaryngology          Orders  No orders of the defined types were placed in this encounter.        Discussion/Plan:  Radha does not have tonsil asymmetry, but rather she has mild enlargement of both tonsils, but has bilateral tonsilliths.  I reviewed with her and her   through Google translate how they are benign and they can form in crytpic tonsils. Treatment includes observation, manual removal (with finger or qtip) water pick use, and gargle of 1/2 water, 1/2 hydrogen peroxide.  She just started having these noticeably.  She has no other history of infections.  Consider tonsillectomy if symptoms worsen.      Dictation software was used to dictate this note. It may contain errors with dictating incorrect words/spelling. Please contact provider directly for any questions.     Thank you for allowing me to participate in the care of your patient.

## 2024-11-27 ENCOUNTER — TELEPHONE (OUTPATIENT)
Dept: PAIN MEDICINE | Facility: CLINIC | Age: 34
End: 2024-11-27

## 2024-11-27 ENCOUNTER — TELEPHONE (OUTPATIENT)
Age: 34
End: 2024-11-27

## 2024-11-27 NOTE — TELEPHONE ENCOUNTER
Patient called asking to speak to [facility].  Warm transfer to [facility].

## 2024-12-03 ENCOUNTER — CONSULT (OUTPATIENT)
Age: 34
End: 2024-12-03
Payer: COMMERCIAL

## 2024-12-03 VITALS — HEIGHT: 64 IN | BODY MASS INDEX: 27.14 KG/M2 | WEIGHT: 159 LBS

## 2024-12-03 DIAGNOSIS — M54.16 LUMBAR RADICULOPATHY: Primary | ICD-10-CM

## 2024-12-03 PROCEDURE — 99204 OFFICE O/P NEW MOD 45 MIN: CPT | Performed by: STUDENT IN AN ORGANIZED HEALTH CARE EDUCATION/TRAINING PROGRAM

## 2024-12-03 RX ORDER — MELOXICAM 15 MG/1
15 TABLET ORAL DAILY PRN
Qty: 30 TABLET | Refills: 0 | Status: SHIPPED | OUTPATIENT
Start: 2024-12-03 | End: 2025-01-02

## 2024-12-03 NOTE — PROGRESS NOTES
Assessment:  1. Lumbar radiculopathy        Plan:    Radha Abbasi is a 33 y.o. female who presents for evaluation of low back pain radiating down posterior right extremity to the foot consistent with lumbar radiculopathy. We discussed imaging, rehabilitation, optimization of medications and potential interventions. The following plan was formulated with the patient:    - Xray lumbar Spine  - MRI Lumbar Spine w/o contrast  - Mobic 15mg daily PRN. Advised not to take with Ibuprofen and to take with food.  - Follow up in 4 weeks.    Orders Placed This Encounter   Procedures    XR spine lumbar minimum 4 views non injury     Standing Status:   Future     Expected Date:   12/3/2024     Expiration Date:   12/3/2028     Scheduling Instructions:      Bring along any outside films relating to this procedure.           Reason for Exam::   low back pain     Is the patient pregnant?:   No    MRI lumbar spine wo contrast     Standing Status:   Future     Expected Date:   12/3/2024     Expiration Date:   12/3/2028     Scheduling Instructions:      There is no preparation for this test. Please leave your jewelry and valuables at home, wedding rings are the exception. Please bring your physician order, insurance cards, a form of photo ID and a list of your medications with you. Arrive 15 minutes prior to your appointment time in order to       register. Please bring any prior CT or MRI studies of this area that were not performed at a [facility].            To schedule this appointment, please contact Central Scheduling at [phone].     What is the patient's sedation requirement?:   No Sedation     Does the patient have metallic implants, such as a pacemaker or shunt?:   No     Is the patient pregnant?:   No       New Medications Ordered This Visit   Medications    meloxicam (MOBIC) 15 mg tablet     Sig: Take 1 tablet (15 mg total) by mouth daily as needed for moderate pain     Dispense:  30 tablet     Refill:  0        My impressions and treatment recommendations were discussed in detail with the patient, who verbalized understanding and had no further questions.    History of Present Illness:    Referred by: Luz Maria Anna DC     Radha Abbasi is a 33 y.o. female who presents for initial evaluation of low back pain radiating down the right leg. Pain started 10 years ago and is not associated with any inciting event or trauma. She describes the pain as shooting and numbness of moderate severity. Pain is rated 2-3/10 and interferes with daily activities. This pain is intermittent. Exacerbating factors include getting her children ready in the morning, yardwork.     She has tried chiropracr sessions with mild relief.    Pain is causing significant functional limitation resulting in diminished quality of life and impaired age appropriate ADLs.  Denies fever, chills, numbness/tingling, bowel/bladder dysfunction, motor weakness.    I have personally reviewed and/or updated the patient's past medical history, past surgical history, family history, social history, current medications, allergies, and vital signs today.     Review of Systems:    Review of Systems   Constitutional:  Positive for fatigue.   HENT: Negative.     Eyes: Negative.    Respiratory: Negative.     Cardiovascular: Negative.    Gastrointestinal: Negative.    Endocrine: Negative.    Genitourinary: Negative.    Musculoskeletal:  Positive for back pain.   Skin: Negative.    Allergic/Immunologic: Positive for environmental allergies.   Neurological:  Positive for weakness and numbness.   Psychiatric/Behavioral:  Positive for sleep disturbance.        Past Medical History:   Diagnosis Date    Maternal excessive weight gain     57 lbs    Pruritic urticarial papules and plaques of pregnancy (puppp) 2017    Varicella w/o complication childhood    unsure    Wears glasses        Past Surgical History:   Procedure Laterality Date    [place]  DELIVERY ONLY N/A  2017    Procedure:  SECTION ();  Surgeon: Maty Peace MD;  Location: [place] ;  Service: Obstetrics    [place]  DELIVERY ONLY N/A 2020    Procedure:  SECTION () REPEAT;  Surgeon: Robe Escobedo MD;  [facility];  Service: Obstetrics    WISDOM TOOTH EXTRACTION         Family History   Problem Relation Age of Onset    Hyperlipidemia Mother     Hypertension Mother     Arthritis Mother     Diabetes type II Mother     Hyperlipidemia Father     Diabetes type II Sister     Diabetes type II Sister     No Known Problems Sister     Seizures Brother     No Known Problems Brother     No Known Problems Brother     No Known Problems Brother     No Known Problems Brother     No Known Problems Maternal Grandmother     Lung cancer Maternal Grandfather     No Known Problems Paternal Grandmother     Lung cancer Paternal Grandfather     No Known Problems Son     No Known Problems Son        Social History     Occupational History    Not on file   Tobacco Use    Smoking status: Never    Smokeless tobacco: Never   Vaping Use    Vaping status: Never Used   Substance and Sexual Activity    Alcohol use: No    Drug use: No    Sexual activity: Yes     Partners: Male     Birth control/protection: None         Current Outpatient Medications:     meloxicam (MOBIC) 15 mg tablet, Take 1 tablet (15 mg total) by mouth daily as needed for moderate pain, Disp: 30 tablet, Rfl: 0    Calcium Carbonate-Vitamin D (CALCIUM 500/D PO), Take by mouth, Disp: , Rfl:     cetirizine (ZyrTEC) 10 mg tablet, TAKE 1 TABLET BY MOUTH EVERY MORNING FOR 14 DAYS (Patient not taking: Reported on 2024), Disp: , Rfl:     Diclofenac Sodium (VOLTAREN) 1 %, Apply 2 g topically 4 (four) times a day (Patient not taking: Reported on 2024), Disp: 50 g, Rfl: 1    fluticasone (FLONASE) 50 mcg/act nasal spray, 2 SPRAYS INTO EACH NOSTRIL 4 (FOUR) TIMES A DAY AS NEEDED FOR RHINITIS OR ALLERGIES (Patient not taking:  "Reported on 4/17/2024), Disp: 16 mL, Rfl: 0    Multiple Vitamin (MULTIVITAMIN) capsule, Take 1 capsule by mouth daily (Patient not taking: Reported on 7/16/2024), Disp: , Rfl:     naproxen (EC NAPROSYN) 375 MG TBEC, Take 1 tablet (375 mg total) by mouth 2 (two) times a day with meals for 7 days, Disp: 14 tablet, Rfl: 0    Omega-3 Fatty Acids (FISH OIL BURP-LESS PO), Take by mouth (Patient not taking: Reported on 7/16/2024), Disp: , Rfl:     sertraline (ZOLOFT) 100 mg tablet, Take 100 mg by mouth every morning (Patient not taking: Reported on 9/20/2024), Disp: , Rfl:     No Known Allergies    Physical Exam:  Ht 5' 4\" (1.626 m)   Wt 72.1 kg (159 lb)   BMI 27.29 kg/m²     Constitutional: normal, well developed, well nourished, alert, in no distress and non-toxic and no overt pain behavior.  HEENT: grossly intact  Neck: supple, symmetric, trachea midline and no masses   Pulmonary:even and unlabored  Cardiovascular:No edema or pitting edema present  Skin:Normal without rashes or lesions and well hydrated  Psychiatric:Mood and affect appropriate  Neurologic:Cranial Nerves II-XII grossly intact  Musculoskeletal:    Lumbar Musculoskeletal and Neurologic Exam:    Inspection: no atrophy of the LE musculature    Gait: able to heel- and toe-walk; non-antalgic    ROM: limited AROM of the lumbar spine in all planes; FROM at bilateral hips   Sensation: SILT bilateral L2-S1 dermatomes    Strength: 5/5 BLE    Reflexes: 2+ in bilateral knees and ankles.    Palpation: -TTP over bilateral greater trochanters and bilateral SI Joint. Diffusely tender over lumbar paraspinals    Provocative tests:     Seated Slump  positive on right    Bravo's: negative bilaterally      Imaging   "

## 2024-12-16 ENCOUNTER — TELEPHONE (OUTPATIENT)
Dept: PAIN MEDICINE | Facility: CLINIC | Age: 34
End: 2024-12-16

## 2024-12-26 ENCOUNTER — TELEPHONE (OUTPATIENT)
Age: 34
End: 2024-12-26

## 2024-12-26 NOTE — TELEPHONE ENCOUNTER
LVM for patient cancelling appt because she has not completed required imaging. Stated if she completed outside of [facility] to give us a call back to be put back on the schedule. If not, patient would need to complete both xray and MRI prior to rescheduling appt.

## 2025-01-23 ENCOUNTER — OFFICE VISIT (OUTPATIENT)
Dept: INTERNAL MEDICINE CLINIC | Facility: CLINIC | Age: 35
End: 2025-01-23

## 2025-01-23 ENCOUNTER — APPOINTMENT (OUTPATIENT)
Dept: LAB | Facility: CLINIC | Age: 35
End: 2025-01-23
Payer: COMMERCIAL

## 2025-01-23 VITALS
HEART RATE: 109 BPM | BODY MASS INDEX: 29.11 KG/M2 | SYSTOLIC BLOOD PRESSURE: 99 MMHG | WEIGHT: 169.6 LBS | DIASTOLIC BLOOD PRESSURE: 66 MMHG | TEMPERATURE: 99.8 F

## 2025-01-23 DIAGNOSIS — J02.9 ACUTE PHARYNGITIS, UNSPECIFIED ETIOLOGY: ICD-10-CM

## 2025-01-23 DIAGNOSIS — J02.9 ACUTE PHARYNGITIS, UNSPECIFIED ETIOLOGY: Primary | ICD-10-CM

## 2025-01-23 DIAGNOSIS — H01.014 ULCERATIVE BLEPHARITIS OF LEFT UPPER EYELID: ICD-10-CM

## 2025-01-23 DIAGNOSIS — R21 RASH AND OTHER NONSPECIFIC SKIN ERUPTION: ICD-10-CM

## 2025-01-23 PROBLEM — H01.004 BLEPHARITIS OF LEFT UPPER EYELID: Status: ACTIVE | Noted: 2025-01-23

## 2025-01-23 LAB
S PYO AG THROAT QL: NEGATIVE
SARS-COV-2 AG UPPER RESP QL IA: NEGATIVE
VALID CONTROL: NORMAL

## 2025-01-23 PROCEDURE — 87811 SARS-COV-2 COVID19 W/OPTIC: CPT | Performed by: STUDENT IN AN ORGANIZED HEALTH CARE EDUCATION/TRAINING PROGRAM

## 2025-01-23 PROCEDURE — 87880 STREP A ASSAY W/OPTIC: CPT | Performed by: STUDENT IN AN ORGANIZED HEALTH CARE EDUCATION/TRAINING PROGRAM

## 2025-01-23 PROCEDURE — 87636 SARSCOV2 & INF A&B AMP PRB: CPT

## 2025-01-23 PROCEDURE — 99215 OFFICE O/P EST HI 40 MIN: CPT | Performed by: STUDENT IN AN ORGANIZED HEALTH CARE EDUCATION/TRAINING PROGRAM

## 2025-01-23 RX ORDER — HYDROCORTISONE 25 MG/G
OINTMENT TOPICAL 2 TIMES DAILY
Qty: 28.35 G | Refills: 2 | Status: SHIPPED | OUTPATIENT
Start: 2025-01-23

## 2025-01-23 RX ORDER — CALAMINE
LOTION (ML) TOPICAL AS NEEDED
Qty: 180 ML | Refills: 1 | Status: SHIPPED | OUTPATIENT
Start: 2025-01-23

## 2025-01-23 NOTE — PROGRESS NOTES
Name: Radha Abbasi      : 1990      MRN: [id number]  Encounter Provider: Amanda Guevara DO  Encounter Date: 2025   Encounter department: [facility]    Assessment & Plan  Acute pharyngitis, unspecified etiology  Rapid strep in office negative   CENTOR criteria 2 (tonsillar swelling and LAD cervical b/l) and in contact with young children  COVID negative  Flu ordered   Likely to be positive, and if so, qualifies for tamiflu since within 72 hours symptom onset  Continue conservative and supportive care (ice/heat, tylenol for fevers and myalgias, rest, plenty of fluid, and hand hygiene)  ED precautions reviewed, particularly if intractable vomiting, fevers >102, or no improvement after 7 days    Orders:    POCT rapid ANTIGEN strepA    COVID/FLU; Future    POCT Rapid Covid Ag    Rash and other nonspecific skin eruption  Ddx: viral exanthum vs cocksackie vs strep manifestation vs pityriasis rosacea vs unlikely contact dermatitis vs irritant dermatitis     No new soaps, detergents, shampoos, medications, or supplements  Conservative treatment with symptomatic creams as below  Likely to resolve with viral resolution  If viral illness resolved and rash remains, may give short course PO prednisone    Orders:    diphenhydrAMINE (BENADRYL) 2 % cream; Apply topically 3 (three) times a day as needed for itching    calamine lotion; Apply topically as needed for itching    hydrocortisone 2.5 % ointment; Apply topically 2 (two) times a day    Ulcerative blepharitis of left upper eyelid  Conservative treatment with warm compress to eye 20 min on/ 20 min off  No ulcerations, vesicles, or macules appreciated, thus unlikely herpes zoster, herpes simplex, molluscum cantagiosum              History of Present Illness     Patient is a pleasant 34 year old female who presents today as a same day for acute fever, myalgias, and rash.    Patient speaks english and Tajik, [facility]   #[id number] used during encounter    Patient endorses 1 day of fever and chills, intense body aches, and fatigue. Fever at home 110-102, which she took tylenol for, which improved body aches and fever temporarily. Patient endorses nausea and NBNB emesis x1, as she threw up yesterday's breakfast. She has generally been too nauseous to eat. For the joint pain, she took ibuprofen 200mg x1, and is concerned the rash is from that. Upon further questioning, the patient has taken NSAIDs previously without similar reaction.    Pt has x2 children at home,  and 2nd grade (4 and 6 y/o) with similar symptoms. They have been dx with flu and go to public school.    Patient's  in room and confirms HPI.       Review of Systems   Constitutional:  Positive for chills and fever.   HENT:  Negative for congestion and sinus pressure.    Eyes:  Positive for redness and itching.   Respiratory:  Positive for cough.    Gastrointestinal:  Positive for abdominal pain, nausea and vomiting (x1 NBNB, food). Negative for constipation and diarrhea.   Musculoskeletal:  Positive for myalgias.   Skin:  Positive for rash.     Past Medical History:   Diagnosis Date    Maternal excessive weight gain     57 lbs    Pruritic urticarial papules and plaques of pregnancy (puppp) 2017    Varicella w/o complication childhood    unsure    Wears glasses      Past Surgical History:   Procedure Laterality Date    [place]  DELIVERY ONLY N/A 2017    Procedure:  SECTION ();  Surgeon: Maty Peace MD;  Location: [facility];  Service: Obstetrics    [place]  DELIVERY ONLY N/A 2020    Procedure:  SECTION () REPEAT;  Surgeon: Robe Escobedo MD;  Location: [facility];  Service: Obstetrics    WISDOM TOOTH EXTRACTION       Family History   Problem Relation Age of Onset    Hyperlipidemia Mother     Hypertension Mother     Arthritis Mother     Diabetes type II Mother     Hyperlipidemia Father     Diabetes  type II Sister     Diabetes type II Sister     No Known Problems Sister     Seizures Brother     No Known Problems Brother     No Known Problems Brother     No Known Problems Brother     No Known Problems Brother     No Known Problems Maternal Grandmother     Lung cancer Maternal Grandfather     No Known Problems Paternal Grandmother     Lung cancer Paternal Grandfather     No Known Problems Son     No Known Problems Son      Social History     Tobacco Use    Smoking status: Never    Smokeless tobacco: Never   Vaping Use    Vaping status: Never Used   Substance and Sexual Activity    Alcohol use: No    Drug use: No    Sexual activity: Yes     Partners: Male     Birth control/protection: None     Current Outpatient Medications on File Prior to Visit   Medication Sig    Calcium Carbonate-Vitamin D (CALCIUM 500/D PO) Take by mouth    cetirizine (ZyrTEC) 10 mg tablet TAKE 1 TABLET BY MOUTH EVERY MORNING FOR 14 DAYS (Patient not taking: Reported on 4/17/2024)    Diclofenac Sodium (VOLTAREN) 1 % Apply 2 g topically 4 (four) times a day (Patient not taking: Reported on 4/17/2024)    fluticasone (FLONASE) 50 mcg/act nasal spray 2 SPRAYS INTO EACH NOSTRIL 4 (FOUR) TIMES A DAY AS NEEDED FOR RHINITIS OR ALLERGIES (Patient not taking: Reported on 4/17/2024)    meloxicam (MOBIC) 15 mg tablet Take 1 tablet (15 mg total) by mouth daily as needed for moderate pain    Multiple Vitamin (MULTIVITAMIN) capsule Take 1 capsule by mouth daily (Patient not taking: Reported on 7/16/2024)    naproxen (EC NAPROSYN) 375 MG TBEC Take 1 tablet (375 mg total) by mouth 2 (two) times a day with meals for 7 days    Omega-3 Fatty Acids (FISH OIL BURP-LESS PO) Take by mouth (Patient not taking: Reported on 7/16/2024)    sertraline (ZOLOFT) 100 mg tablet Take 100 mg by mouth every morning (Patient not taking: Reported on 9/20/2024)     No Known Allergies  Immunization History   Administered Date(s) Administered    COVID-19 PFIZER VACCINE 0.3 ML IM  04/22/2021, 05/14/2021    DTaP 01/12/2017    Tdap 06/12/2017, 04/15/2020     Objective   BP 99/66 (BP Location: Right arm, Patient Position: Sitting, Cuff Size: Large)   Pulse (!) 109   Temp 99.8 °F (37.7 °C) (Temporal)   Wt 76.9 kg (169 lb 9.6 oz)   BMI 29.11 kg/m²     Physical Exam  Constitutional:       General: She is not in acute distress.     Appearance: She is well-developed. She is ill-appearing.   HENT:      Head: Normocephalic and atraumatic.      Nose: Nose normal. No congestion.      Mouth/Throat:      Mouth: Mucous membranes are moist.      Pharynx: Posterior oropharyngeal erythema (oropharngeal, b/l) present.   Eyes:      General: No scleral icterus.     Conjunctiva/sclera: Conjunctivae normal.      Comments: +L eyelid swelling consistent with blepharitis   Neck:      Thyroid: No thyromegaly.   Cardiovascular:      Rate and Rhythm: Normal rate and regular rhythm.      Heart sounds: Normal heart sounds. No murmur heard.     No friction rub. No gallop.   Pulmonary:      Effort: Pulmonary effort is normal. No respiratory distress.      Breath sounds: Normal breath sounds. No stridor. No wheezing or rales.      Comments: Intermittent coughing spells  Abdominal:      General: Bowel sounds are normal. There is no distension.      Palpations: Abdomen is soft. There is no mass.      Tenderness: There is no abdominal tenderness. There is no guarding or rebound.   Musculoskeletal:         General: No deformity.      Cervical back: Neck supple.      Right lower leg: No edema.      Left lower leg: No edema.   Lymphadenopathy:      Cervical: Cervical adenopathy present.   Skin:     General: Skin is warm.      Capillary Refill: Capillary refill takes less than 2 seconds.      Findings: Erythema and rash (scattered globally from waist up, please see media for details) present.   Neurological:      Mental Status: She is alert and oriented to person, place, and time.   Psychiatric:         Behavior: Behavior  normal.         Thought Content: Thought content normal.         Judgment: Judgment normal.       Administrative Statements   I have spent a total time of 40 minutes in caring for this patient on the day of the visit/encounter including Diagnostic results, Prognosis, Risks and benefits of tx options, Instructions for management, Patient and family education, Importance of tx compliance, Risk factor reductions, Impressions, Counseling / Coordination of care, Documenting in the medical record, Reviewing / ordering tests, medicine, procedures  , Obtaining or reviewing history  , and Communicating with other healthcare professionals .

## 2025-01-23 NOTE — ASSESSMENT & PLAN NOTE
Conservative treatment with warm compress to eye 20 min on/ 20 min off  No ulcerations, vesicles, or macules appreciated, thus unlikely herpes zoster, herpes simplex, molluscum cantagiosum

## 2025-01-24 ENCOUNTER — DOCUMENTATION (OUTPATIENT)
Dept: INTERNAL MEDICINE CLINIC | Facility: CLINIC | Age: 35
End: 2025-01-24

## 2025-01-24 DIAGNOSIS — J10.1 INFLUENZA A: Primary | ICD-10-CM

## 2025-01-24 LAB
FLUAV RNA RESP QL NAA+PROBE: POSITIVE
FLUBV RNA RESP QL NAA+PROBE: NEGATIVE
SARS-COV-2 RNA RESP QL NAA+PROBE: NEGATIVE

## 2025-01-24 RX ORDER — OSELTAMIVIR PHOSPHATE 75 MG/1
75 CAPSULE ORAL 2 TIMES DAILY
Qty: 10 CAPSULE | Refills: 0 | Status: SHIPPED | OUTPATIENT
Start: 2025-01-24 | End: 2025-01-29

## 2025-01-24 NOTE — PROGRESS NOTES
Per Dr. Guevara test if positive for Influenza A. She called in Tamiflu. Patients  made aware and no questions at this time.

## 2025-01-29 NOTE — PROGRESS NOTES
PT Evaluation     Today's date: 2025  Patient name: Radha Abbasi  : 1990  MRN: [id number]  Referring provider: Анна Geiger MD  Dx:   Encounter Diagnosis     ICD-10-CM    1. Lumbar radiculopathy  M54.16 Ambulatory referral to Physical Therapy                     Assessment  Impairments: abnormal coordination, abnormal muscle firing, abnormal or restricted ROM, abnormal movement, activity intolerance, impaired physical strength, lacks appropriate home exercise program, pain with function, poor posture , poor body mechanics, unable to perform ADL, sensory processing, participation limitations, activity limitations and endurance  Symptom irritability: high    Assessment details: Radha Abbasi is a pleasant 34 y.o. female with a referred dx of Lumbar radiculopathy from Анна Geiger MD.  No further referral appears necessary at this time based upon examination results. Overall, patient's symptoms are consistent with lumbar peripheral involvement and imaging was ordered by MD to rule out significant abnormalities. Upon further clinical testing, patient presents with the following deficits: active and passive motor lumbar dysfunction, mechanical derangement, poor dural mobility on RLE, neural sensitization, peripheral neurogenic symptoms, lumbopelvic dysfunction, and TTP over lumbar paraspinals. Patient's reflexes were slightly hyperreflexive bilaterally, however did present with decrease sensation along L4-L5 dermatomal distrubution, further consistent with lumbar involvement. These deficits and impairments result in decrease quality of life and difficulties performing functional tasks including getting her children ready in the morning, housework, bending forward for yardwork, sweeping, and sleeping. Pt was provided with a basic HEP focused on neurodynamics and Elida repeated extensions which will be reviewed in the upcoming session. Pt able to demonstrate HEP with good technique and no pain.  Educated pt to stop any exercises causing pain increase, pt verbalizes understanding. Pt was educated on anatomy and physiology of diagnosis and demonstrated verbal understanding. Positive prognostic indicators include good understanding of diagnosis and treatment plan options. Negative prognostic indicators include chronicity of symptoms, degree of peripheralization, multiple prior failed treatments, and dependency on medications. Pt would benefit from skilled OP physical therapy to address these, and the below impairments in order to optimize outcomes and promote return to functional baseline.     Patient verbalized understanding of POC.    Please contact me if you have any questions or recommendations. Thank you for the referral and the opportunity to share in [facility]'s care      Barriers to intervention: ESL  Understanding of Dx/Px/POC: good     Prognosis: good    Goals    Short Term Goals:  In 4-8 weeks, the patient will:  1. Pt will report at least a 25% reduction in subjective pain complaints/symptoms to better manage ADLs and household chores.   2. Pt will be able to self-centralize via Elida protocol  3. Pt independent with initial HEP, rationale, technique and frequency, for ROM and pain control.  4. Pt will demonstrate improvement in RLE strength by a half grade      Long Term Goals:  In 12 weeks, the patient will:  1. Pt will have decrease neural sensitivity and ability to tolerate neural testing  2. FOTO to greater than predicted value.  3. Independent with comprehensive HEP upon discharge.  4. Pt will be able to perform ADLs, iADLS, and household duties with minimal restriction indicating return to PLOF.  5. Pt independent with rationale, technique and plan for performance of advanced HEP to ensure independent self-management of symptoms upon discharge.  6.Personal Goals: no pain with performing yardwork, taking care of her kids, sleeping and lifting    Plan  Patient would benefit from: PT adilene and  skilled physical therapy  Referral necessary: No  Planned modality interventions: cryotherapy, biofeedback and TENS    Planned therapy interventions: activity modification, ADL retraining, joint mobilization, manual therapy, massage, behavior modification, body mechanics training, muscle pump exercises, motor coordination training, nerve gliding, neuromuscular re-education, patient education, postural training, community reintegration, self care, sensory integrative techniques, strengthening, stretching, therapeutic activities, therapeutic exercise, therapeutic training, home exercise program, graded motor, graded exercise, graded activity, functional ROM exercises, abdominal trunk stabilization and patient/caregiver education    Frequency: 1-2x week  Plan of Care beginning date: 1/30/2025  Plan of Care expiration date: 4/10/2025  Treatment plan discussed with: patient        Subjective Evaluation    History of Present Illness  Mechanism of injury: Patient presents with  for translation for a PT initial evaluation regarding concerns of chronic lumbar pain with radicular symptoms. She has been referred by Pain Management. Pain has been ongoing for the last 10 years and has progressively worsen. She notes pain was present prior to childbirth. Pain is centrally located with R-sided radicular symptoms into the R foot. radiated down posterior right extremity into R foot. Per insurance, requires 6 visits to receive MRI authorization.  Denies any GUERLINE or inciting event. + for numbness and paresthesias. Has trialed medication and chiropractor minimal to mild relief.     Denies any red flags which include: fever, chills, chest pain, focal neurologic deficits, urinary distention, urinary incontinence, fecal incontinence, saddle anesthesia.    Aggravating Factors: getting children ready in the morning, housework bending forward for yardwork, sweeping sleeping  Relieving Factors: not now     Personal Functional Goals:  wants to get rid of tightness/pain            Not a recurrent problem   Quality of life: good    Patient Goals  Patient goals for therapy: increased strength, independence with ADLs/IADLs, return to sport/leisure activities, decreased pain and increased motion    Pain  Current pain ratin  At best pain ratin  At worst pain ratin  Location: Lumbar  Quality: sharp, radiating, dull ache, discomfort, squeezing, tight and needle-like  Relieving factors: change in position and medications  Aggravating factors: standing, walking, lifting and overhead activity (ADLS)  Progression: worsening    Social Support  Lives with: spouse and young children    Employment status: not working  Exercise history: None      Diagnostic Tests  No diagnostic tests performed  Treatments  Previous treatment: chiropractic and medication  Current treatment: physical therapy        Objective      Lying Baseline:       DURING MVMT.  RESPONSE AFTER  Lying Extension Improved P! No Peripheralization       LE MMT Strength:  Test Action RIGHT  LEFT   Hip Flexion 4/5 5/5   Knee Extension 4/5 5/5   Knee Flexion 5/5 5/5   Ankle DF 4/5 5/5   Ankle PF 5/5 5/5           NEURO Screen  Reflexes  Right Left   Patellar (L3-L4) 3+ 3+   Achilles (S1-S2) 2+ 2+   Babinski's N N   Clonus N N        LE Myotomes:  Nerve root Test Action RIGHT LEFT   L1-L2 Hip Flexion Intact Intact   L3 Knee Extension Intact Intact   L4  Ankle DF Altered Intact   L5 Great toe Extension Altered Intact   S1 Ankle PF, Ankle Eversion, Hip Extension, Knee flexion Intact Intact   S2 Ankle PF Intact Intact       LE Dermatomes: Decreased along L4-L5 distrubtion    Special Tests:  Lumbar Specific and Neural Tension                                                                            Test / Measure  Right 2025 Left 2025   Straight Leg raise + N   Crossed straight leg raise + N   Slump test + N   LEILANI N N   FADIR P! N          Precautions: hx of  in ,  Bulgarian Speaking * Needed*      POC expires Unit limit Auth Expiration date PT/OT + Visit Limit?   4/10/25 bomn 12/31/25 bomn         Visit/Unit Tracking  AUTH Status:  Date 1/30/2025        24 Visits per Auth Used 1         Remaining  23           Pertinent Findings:      Test / Measure  1/30/2025   FOTO (Predicted np) np                 Access Code: [email or web address]  URL: [email or web address]  Date: 01/30/2025  Prepared by: Sissy Gonzalez    Exercises  - Supine Sciatic Nerve Glide  - 1-2 x daily - 7 x weekly - 1-3 sets - 5 reps  - Prone Press Up On Elbows  - 1-2 x daily - 7 x weekly - 1-3 sets - 5-10 reps    Manuals 1/30                                                                Neuro Re-Ed             Patient Education/HEP AR 23'            LTR Rotations             Lumbar Cat/Cow             Prone Press-Ups             Prone UE/LE Alternating Lifts             Bridge (bosu)             Slump Nerve Glides             Diaphragmatic Breathing supine                          Ther Ex                                                                                                                     Ther Activity                                       Gait Training                                       Modalities

## 2025-01-30 ENCOUNTER — EVALUATION (OUTPATIENT)
Dept: PHYSICAL THERAPY | Facility: REHABILITATION | Age: 35
End: 2025-01-30
Payer: COMMERCIAL

## 2025-01-30 DIAGNOSIS — M54.16 LUMBAR RADICULOPATHY: ICD-10-CM

## 2025-01-30 PROCEDURE — 97161 PT EVAL LOW COMPLEX 20 MIN: CPT

## 2025-01-30 PROCEDURE — 97112 NEUROMUSCULAR REEDUCATION: CPT

## 2025-02-04 ENCOUNTER — OFFICE VISIT (OUTPATIENT)
Dept: PHYSICAL THERAPY | Facility: REHABILITATION | Age: 35
End: 2025-02-04
Payer: COMMERCIAL

## 2025-02-04 DIAGNOSIS — M54.16 LUMBAR RADICULOPATHY: Primary | ICD-10-CM

## 2025-02-04 PROCEDURE — 97110 THERAPEUTIC EXERCISES: CPT

## 2025-02-04 PROCEDURE — 97112 NEUROMUSCULAR REEDUCATION: CPT

## 2025-02-04 NOTE — PROGRESS NOTES
Daily Note     Today's date: 2025  Patient name: Radha Abbasi  : 1990  MRN: [id number]  Referring provider: Анна Geiger MD  Dx:   Encounter Diagnosis     ICD-10-CM    1. Lumbar radiculopathy  M54.16           Start Time: 1245  Stop Time: 1330  Total time in clinic (min): 45 minutes    Subjective: Patient states she is doing better. Symptoms down the leg are about the same.       Objective: See treatment diary below      Assessment: Patient tolerated treatment session fair today with focus on neurodynamics and Elida-based therapy. Patient demonstrated significant limitations due to both UE/LE weakness. Responded well to breathing diaphragmatic. Updated her HEP. Patient will continue to be appropriate for skilled outpatient physical therapy in order to address impairments. 1:1 with Sissy Gonzalez, PT, DPT for entirety of treatment session.        Plan: Continue per plan of care.      Precautions: hx of  in , [place] Speaking * Needed*      POC expires Unit limit Auth Expiration date PT/OT + Visit Limit?   4/10/25 bomn 25 bomn         Visit/Unit Tracking  AUTH Status:  Date 2025       24 Visits per Auth Used 1           Pertinent Findings:      Test / Measure  2025   FOTO (Predicted np) np   SLR/Slump + on RLE             Access Code: [email or web address]  URL: [email or web address]  Date: 2025  Prepared by: Sissy Gonzalez    Exercises  - Supine Bridge  - 1 x daily - 7 x weekly - 3 sets - 6 reps  - Supine Sciatic Nerve Glide  - 1-2 x daily - 7 x weekly - 1-3 sets - 5 reps  - Prone Press Up On Elbows  - 1-2 x daily - 7 x weekly - 1-3 sets - 5-10 reps  - Seated Slump Nerve Glide  - 1 x daily - 7 x weekly - 2 sets - 10 reps  - 4-7-8 Breathing in Supported Child's Pose  - 1 x daily - 7 x weekly - 1 sets - 5-6 reps    Manuals                                                                Neuro Re-Ed             Patient  Education/HEP AR 23'            LTR Rotations  10x           Lumbar Cat/Cow             Prone Press-Ups  20x           Prone Hip Extension  20x           Prone UE/LE Alternating Lifts             Bridge (head on bosu)  3x6           Slump Nerve Glides  2x10  B/L           Diaphragmatic Breathing supine with pilow  4-7-8  10 rounds                        Ther Ex             NuStep  Seat: 9  Arm: 12  8' L5                                                                                                      Ther Activity                                       Gait Training                                       Modalities

## 2025-02-13 ENCOUNTER — OFFICE VISIT (OUTPATIENT)
Dept: PHYSICAL THERAPY | Facility: REHABILITATION | Age: 35
End: 2025-02-13
Payer: COMMERCIAL

## 2025-02-13 DIAGNOSIS — M54.16 LUMBAR RADICULOPATHY: Primary | ICD-10-CM

## 2025-02-13 PROCEDURE — 97140 MANUAL THERAPY 1/> REGIONS: CPT

## 2025-02-13 PROCEDURE — 97112 NEUROMUSCULAR REEDUCATION: CPT

## 2025-02-13 PROCEDURE — 97110 THERAPEUTIC EXERCISES: CPT

## 2025-02-13 NOTE — PROGRESS NOTES
Daily Note     Today's date: 2025  Patient name: Radha Abbasi  : 1990  MRN: [id number]  Referring provider: Анна Geiger MD  Dx:   Encounter Diagnosis     ICD-10-CM    1. Lumbar radiculopathy  M54.16           Start Time: 1230  Stop Time: 1315  Total time in clinic (min): 45 minutes      Subjective: Patient states had increase numbness and tingling down the leg yesterday. Was trying to use heat on waist/back to relax it, however, didn't help. Has not tried exercises to relieve sympyoms.       Objective: See treatment diary below      Assessment: Patient tolerated treatment session fair today with focus on neurodynamics and Elida-based therapy. She responded the best to manual therapy and lumbar distraction. Notes improvement in radicular symptoms once applied. Recommended to continue to perform prone laying and extension-biased exercises. Patient will continue to be appropriate for skilled outpatient physical therapy in order to address impairments. 1:1 with Sissy Gonzalez, PT, DPT for entirety of treatment session.        Plan: Continue per plan of care.      Precautions: hx of  in , Danish Speaking * Needed*      POC expires Unit limit Auth Expiration date PT/OT + Visit Limit?   4/10/25 bomn 25 bomn         Visit/Unit Tracking  AUTH Status:  Date 2025      24 Visits per Auth Used 1 2 3       Remaining  23 22 21         Pertinent Findings:      Test / Measure  2025   FOTO (Predicted np) np   SLR/Slump + on RLE             Access Code: [email or web address]  URL: [email or web address]  Date: 2025  Prepared by: Sissy Gonzalez    Exercises  - Supine Bridge  - 1 x daily - 7 x weekly - 3 sets - 6 reps  - Supine Sciatic Nerve Glide  - 1-2 x daily - 7 x weekly - 1-3 sets - 5 reps  - Prone Press Up On Elbows  - 1-2 x daily - 7 x weekly - 1-3 sets - 5-10 reps  - Seated Slump Nerve Glide  - 1 x daily - 7 x weekly - 2 sets - 10 reps  - 4-7-8  "Breathing in Supported Child's Pose  - 1 x daily - 7 x weekly - 1 sets - 5-6 reps    Manuals 1/30 2/4 2/13          Lumbar P-A Mobilization    AR          Lumbar Distraction (hips on pball)   AR                                    Neuro Re-Ed             Patient Education/HEP AR 23'            LTR Rotations  10x 20x          Prone Press-Ups  20x           Prone Hip Extension  20x           Prone UE/LE Alternating Lifts             Seated Pball Presses (ball on lap)   5\"  2x10          SLR with Strap assit   3x10          Bridge  3x6 3x6          Slump Nerve Glides  2x10  B/L 2x10  B/L          Diaphragmatic Breathing supine with pilow  4-7-8  10 rounds                        Ther Ex             NuStep  Seat: 9  Arm: 12  8' L5 8' L5          Seated Physioball Lumbar roll Outs   20x                                                                                        Ther Activity                                       Gait Training                                       Modalities                                            "

## 2025-02-18 ENCOUNTER — OFFICE VISIT (OUTPATIENT)
Dept: PHYSICAL THERAPY | Facility: REHABILITATION | Age: 35
End: 2025-02-18
Payer: COMMERCIAL

## 2025-02-18 DIAGNOSIS — M54.16 LUMBAR RADICULOPATHY: Primary | ICD-10-CM

## 2025-02-18 PROCEDURE — 97110 THERAPEUTIC EXERCISES: CPT

## 2025-02-18 PROCEDURE — 97140 MANUAL THERAPY 1/> REGIONS: CPT

## 2025-02-18 PROCEDURE — 97112 NEUROMUSCULAR REEDUCATION: CPT

## 2025-02-18 NOTE — PROGRESS NOTES
Daily Note     Today's date: 2025  Patient name: Radha Abbasi  : 1990  MRN: [id number]  Referring provider: Анна Geiger MD  Dx:   Encounter Diagnosis     ICD-10-CM    1. Lumbar radiculopathy  M54.16             Start Time: 1255  Stop Time: 1339  Total time in clinic (min): 44 minutes    Patient arrived 10 minutes late, was accommodated.     Subjective: Patient states after her last session her foot was completely numb, but felt better the next two days.       Objective: See treatment diary below      Assessment: Patient tolerated treatment session fair today with focus on neurodynamics and Elida-based therapy. She responded the best to manual therapy and lumbar distraction. Notes improvement in radicular symptoms once applied. Recommended to continue to perform prone laying and extension-biased exercises. Patient will continue to be appropriate for skilled outpatient physical therapy in order to address impairments. 1:1 with Sissy Gonzalez, PT, DPT for entirety of treatment session.        Plan: Continue per plan of care.      Precautions: hx of  in , English Speaking * Needed*      POC expires Unit limit Auth Expiration date PT/OT + Visit Limit?   4/10/25 bomn 25 bomn         Visit/Unit Tracking  AUTH Status:  Date 2025     24 Visits per Auth Used 1 2 3 4      Remaining  23 22 21 20        Pertinent Findings:      Test / Measure  2025   FOTO (Predicted np) np   SLR/Slump + on RLE             Access Code: [email or web address]  URL: [email or web address]  Date: 2025  Prepared by: Sissy Gonzalez    Exercises  - Supine Bridge  - 1 x daily - 7 x weekly - 3 sets - 6 reps  - Supine Sciatic Nerve Glide  - 1-2 x daily - 7 x weekly - 1-3 sets - 5 reps  - Prone Press Up On Elbows  - 1-2 x daily - 7 x weekly - 1-3 sets - 5-10 reps  - Seated Slump Nerve Glide  - 1 x daily - 7 x weekly - 2 sets - 10 reps  - 4-7-8 Breathing in Supported Child's  "Pose  - 1 x daily - 7 x weekly - 1 sets - 5-6 reps    Manuals 1/30 2/4 2/13 2/18         Lumbar P-A Mobilization    AR AR         Lumbar Distraction (hips on pball)   AR AR                                   Neuro Re-Ed             Patient Education/HEP AR 23'            LTR Rotations  10x 20x 20x         Prone Press-Ups  20x  20x         Prone Hip Extension  20x  20x         Hookyling TrA Pball Presses   5\"  2x10 5\"x10         SLR with Strap assit   3x10 3x10         Bridge  3x6 3x6 + hip adduction           Slump Nerve Glides  2x10  B/L 2x10  B/L 2x10  B/L         Diaphragmatic Breathing supine with pilow  4-7-8  10 rounds                        Ther Ex             NuStep  Seat: 9  Arm: 12  8' L5 8' L5 8' L5         Seated Physioball Lumbar roll Outs   20x          BKTC on pball    20x                                                                          Ther Activity                                       Gait Training                                       Modalities                                            "

## 2025-02-25 ENCOUNTER — OFFICE VISIT (OUTPATIENT)
Dept: PHYSICAL THERAPY | Facility: REHABILITATION | Age: 35
End: 2025-02-25
Payer: COMMERCIAL

## 2025-02-25 ENCOUNTER — APPOINTMENT (OUTPATIENT)
Dept: PHYSICAL THERAPY | Facility: REHABILITATION | Age: 35
End: 2025-02-25
Payer: COMMERCIAL

## 2025-02-25 DIAGNOSIS — M54.16 LUMBAR RADICULOPATHY: Primary | ICD-10-CM

## 2025-02-25 PROCEDURE — 97110 THERAPEUTIC EXERCISES: CPT

## 2025-02-25 PROCEDURE — 97112 NEUROMUSCULAR REEDUCATION: CPT

## 2025-02-25 NOTE — PROGRESS NOTES
Daily Note     Today's date: 2025  Patient name: Radha Abbasi  : 1990  MRN: [id number]  Referring provider: Анна Geiger MD  Dx:   Encounter Diagnosis     ICD-10-CM    1. Lumbar radiculopathy  M54.16             Start Time: 1250  Stop Time: 1334  Total time in clinic (min): 44 minutes      Subjective: Patient states pain is the same and hasn't gotten any better. Patient would like to finish PT at the 6th visit to get MRI.      Objective: See treatment diary below      Assessment: Patient tolerated treatment session fair today. Reports no increase in her symptoms or pain throughout the sessions. She continues to report most relief with lumbar distraction. Reproduction of symptoms with initiation of standing lumbopelvic strength exercise. Patient will continue to be appropriate for skilled outpatient physical therapy in order to address impairments. 1:1 with Sissy Gonzalez, PT, DPT for entirety of treatment session.        Plan: Continue per plan of care.      Precautions: hx of  in , Italian Speaking * Needed*      POC expires Unit limit Auth Expiration date PT/OT + Visit Limit?   4/10/25 bomn 25 bomn         Visit/Unit Tracking  AUTH Status:  Date 2025    24 Visits per Auth Used 1 2 3 4 5     Remaining  23 22 21 20 19       Pertinent Findings:      Test / Measure  2025   FOTO (Predicted np) np   SLR/Slump + on RLE             Access Code: [email or web address]  URL: [email or web address]  Date: 2025  Prepared by: Sissy Gonzalez    Exercises  - Supine Bridge  - 1 x daily - 7 x weekly - 3 sets - 6 reps  - Supine Sciatic Nerve Glide  - 1-2 x daily - 7 x weekly - 1-3 sets - 5 reps  - Prone Press Up On Elbows  - 1-2 x daily - 7 x weekly - 1-3 sets - 5-10 reps  - Seated Slump Nerve Glide  - 1 x daily - 7 x weekly - 2 sets - 10 reps  - 4-7-8 Breathing in Supported Child's Pose  - 1 x daily - 7 x weekly - 1 sets - 5-6 reps    Manuals   "2/4 2/13 2/18 2/25        Lumbar P-A Mobilization    AR AR AR        Lumbar Distraction (hips on pball)   AR AR AR                                  Neuro Re-Ed             Patient Education/HEP AR 23'            LTR Rotations  10x 20x 20x On [id number]        Prone Press-Ups  20x  20x 20x        Prone Hip Extension  20x  20x         Hookyling TrA Pball Presses   5\"  2x10 5\"x10 5\"x20        Standing TrA Pball Presses     5\"x15        SLR with Strap assit   3x10 3x10         Hip Adduction Isos with Pelvic Tilts     2x10        Bridge  3x6 3x6 + hip adduction  3x10   + hip adduction iso  3x10        Slump Nerve Glides  2x10  B/L 2x10  B/L 2x10  B/L         Diaphragmatic Breathing supine with pilow  4-7-8  10 rounds                        Ther Ex             NuStep  Seat: 9  Arm: 12  8' L5 8' L5 8' L5 8' 5        Seated Physioball Lumbar roll Outs   20x          BKTC on pball    20x 20x                                                                         Ther Activity                                       Gait Training                                       Modalities                                            "

## 2025-03-04 ENCOUNTER — OFFICE VISIT (OUTPATIENT)
Dept: PHYSICAL THERAPY | Facility: REHABILITATION | Age: 35
End: 2025-03-04
Payer: COMMERCIAL

## 2025-03-04 DIAGNOSIS — M54.16 LUMBAR RADICULOPATHY: Primary | ICD-10-CM

## 2025-03-04 PROCEDURE — 97112 NEUROMUSCULAR REEDUCATION: CPT

## 2025-03-04 PROCEDURE — 97140 MANUAL THERAPY 1/> REGIONS: CPT

## 2025-03-04 PROCEDURE — 97110 THERAPEUTIC EXERCISES: CPT

## 2025-03-04 NOTE — PROGRESS NOTES
Daily Note     Today's date: 3/4/2025  Patient name: Radha Abbasi  : 1990  MRN: [id number]  Referring provider: Анна Geiger MD  Dx:   Encounter Diagnosis     ICD-10-CM    1. Lumbar radiculopathy  M54.16             Start Time: 1245  Stop Time: 1330  Total time in clinic (min): 45 minutes      Subjective: Patient states pain is the same and hasn't gotten any better. Symptoms still peripheralized to the foot including numbness and parathesias. She has scheduled her MRI for 3/11/25. Has not gotten X-ray yet.      Objective: See treatment diary below      Assessment: Patient tolerated treatment session fair today. Reports no increase in her symptoms or pain throughout the sessions. Throughout the session she demonstrates fatigue with all lumbar stabilization and strengthening exercises. I continue to educate on the importance of consistency and graded exposure to calm the nervous system while promoting functional gains. Patient will continue to be appropriate for skilled outpatient physical therapy in order to address impairments, however, patient wants to hold until MRI is completed.  1:1 with Sissy Gonzalez, PT, DPT for entirety of treatment session.        Plan: Continue per plan of care.      Precautions: hx of  in , Estonian Speaking * Needed*      POC expires Unit limit Auth Expiration date PT/OT + Visit Limit?   4/10/25 bomn 25 bomn         Visit/Unit Tracking  AUTH Status:  Date 2025 2/4 2/13 2/18 2/25 3/4   24 Visits per Auth Used 1 2 3 4 5 6    Remaining  23 22 21 20 19 18      Pertinent Findings:      Test / Measure  2025   FOTO (Predicted np) np   SLR/Slump + on RLE             Access Code: [email or web address]  URL: [email or web address]  Date: 2025  Prepared by: Sissy Gonzalez    Exercises  - Supine Bridge  - 1 x daily - 7 x weekly - 3 sets - 6 reps  - Supine Sciatic Nerve Glide  - 1-2 x daily - 7 x weekly - 1-3 sets - 5 reps  - Prone Press Up  "On Elbows  - 1-2 x daily - 7 x weekly - 1-3 sets - 5-10 reps  - Seated Slump Nerve Glide  - 1 x daily - 7 x weekly - 2 sets - 10 reps  - 4-7-8 Breathing in Supported Child's Pose  - 1 x daily - 7 x weekly - 1 sets - 5-6 reps    Manuals 1/30 2/4 2/13 2/18 2/25 3/4       Lumbar P-A Mobilization    AR AR AR AR       Lumbar Distraction (hips on pball)   AR AR AR AR                                 Neuro Re-Ed             Patient Education/HEP AR 23'            LTR Rotations  10x 20x 20x On pball  20x On pball  20x    +hip adduction iso  15x       Quadruped Multifids lifts       2x10  B/L       Prone Press-Ups  20x  20x 20x 2x10       Prone Hip Extension  20x  20x         Hookyling TrA Pball Presses   5\"  2x10 5\"x10 5\"x20 5\"x20       Standing TrA Pball Presses     5\"x15        SLR with Strap assit   3x10 3x10         Hip Adduction Isos with Pelvic Tilts     2x10 2x10       Bridge  3x6 3x6 + hip adduction  3x10   + hip adduction iso  3x10 + hip adduction iso  3x10       Slump Nerve Glides  2x10  B/L 2x10  B/L 2x10  B/L         Diaphragmatic Breathing supine with pilow  4-7-8  10 rounds                        Ther Ex             NuStep  Seat: 9  Arm: 12  8' L5 8' L5 8' L5 8' 5 8' L6       Seated Physioball Lumbar roll Outs   20x          BKTC on pball    20x 20x 20x                                                                        Ther Activity                                       Gait Training                                       Modalities                                            "

## 2025-03-05 ENCOUNTER — TELEPHONE (OUTPATIENT)
Age: 35
End: 2025-03-05

## 2025-03-05 NOTE — TELEPHONE ENCOUNTER
Patient has completed 6 weeks of physical therapy and medication management with no relief. Pain is causing significant functional limitation resulting in diminished quality of life and impaired age appropriate ADLs.     Will obtain MRI Lumbar spine w/o contrast given persistent symptoms  despite conservative management.

## 2025-04-03 ENCOUNTER — VBI (OUTPATIENT)
Dept: ADMINISTRATIVE | Facility: OTHER | Age: 35
End: 2025-04-03

## 2025-04-03 NOTE — TELEPHONE ENCOUNTER
04/03/25 8:33 AM     Chart reviewed for Pap Smear (HPV) aka Cervical Cancer Screening ; nothing is submitted to the patient's insurance at this time.     Swetha Montgomery   [facility]

## 2025-04-08 ENCOUNTER — HOSPITAL ENCOUNTER (OUTPATIENT)
Dept: RADIOLOGY | Age: 35
Discharge: HOME | End: 2025-04-08
Payer: COMMERCIAL

## 2025-04-08 DIAGNOSIS — M54.16 LUMBAR RADICULOPATHY: ICD-10-CM

## 2025-04-08 PROCEDURE — 72148 MRI LUMBAR SPINE W/O DYE: CPT

## 2025-04-14 ENCOUNTER — APPOINTMENT (OUTPATIENT)
Dept: LAB | Facility: CLINIC | Age: 35
End: 2025-04-14
Payer: COMMERCIAL

## 2025-04-14 ENCOUNTER — HOSPITAL ENCOUNTER (OUTPATIENT)
Dept: RADIOLOGY | Facility: HOSPITAL | Age: 35
Discharge: HOME | End: 2025-04-14
Payer: COMMERCIAL

## 2025-04-14 DIAGNOSIS — M54.16 LUMBAR RADICULOPATHY: ICD-10-CM

## 2025-04-14 PROCEDURE — 72110 X-RAY EXAM L-2 SPINE 4/>VWS: CPT

## 2025-04-16 ENCOUNTER — OFFICE VISIT (OUTPATIENT)
Age: 35
End: 2025-04-16
Payer: COMMERCIAL

## 2025-04-16 VITALS — WEIGHT: 169.53 LBS | HEIGHT: 64 IN | BODY MASS INDEX: 28.94 KG/M2

## 2025-04-16 DIAGNOSIS — M54.16 LUMBAR RADICULOPATHY: Primary | ICD-10-CM

## 2025-04-16 DIAGNOSIS — G89.29 CHRONIC RIGHT-SIDED LOW BACK PAIN WITH RIGHT-SIDED SCIATICA: ICD-10-CM

## 2025-04-16 DIAGNOSIS — M54.41 CHRONIC RIGHT-SIDED LOW BACK PAIN WITH RIGHT-SIDED SCIATICA: ICD-10-CM

## 2025-04-16 PROCEDURE — 99214 OFFICE O/P EST MOD 30 MIN: CPT | Performed by: STUDENT IN AN ORGANIZED HEALTH CARE EDUCATION/TRAINING PROGRAM

## 2025-04-16 RX ORDER — GABAPENTIN 100 MG/1
CAPSULE ORAL
Qty: 90 CAPSULE | Refills: 0 | Status: SHIPPED | OUTPATIENT
Start: 2025-04-16

## 2025-04-16 NOTE — PROGRESS NOTES
Assessment:  1. Lumbar radiculopathy    2. Chronic right-sided low back pain with right-sided sciatica        Plan:    Radha Abbasi is a 34 y.o. female who presents for follow up office visit in regards to low back pain radiating down posterior RLE to the foot. MRI Lumbar spine with L5-S1 diffuse disc bulge, no significant central canal or neural foraminal narrowing. We discussed imaging, rehabilitation, optimization of medications and potential interventions. The following plan was formulated with the patient:    - NCS/EMG RLE  - Gabapentin 100 QHSx3 days, then BIDx3 days, then TID after. Advised to watch for sedative effects and provided up titration calendar.  Counseled not to take medication while driving or operating heavy machinery/using stairs   - C/w Meloxicam 15mg daily PRN. Advised to take with food.  - We did discuss lumbar epidural as possibility, she is afraid of injections and wants to try medication management first.  - Follow up in     Orders Placed This Encounter   Procedures    EMG 1 Limb     Standing Status:   Future     Expiration Date:   4/16/2026     Scheduling Instructions:      RLE numbness/tingling. MRI Lumbar spine wnl     Does the patient have an external cardiac device (LVAD)?:   No     Is the patient on any anticoagulation therapies?:   No     Did the onset of symptoms occur more than 2 weeks ago from today?:   No     Affected Location (Up to 2 Per Order)::   Lower Right Limb     Reason for Exam::   lumbar radiculopathy       New Medications Ordered This Visit   Medications    gabapentin (NEURONTIN) 100 mg capsule     Sig: Gabapentin 100 QHSx3 days, then BIDx3 days, then TID after.     Dispense:  90 capsule     Refill:  0       My impressions and treatment recommendations were discussed in detail with the patient, who verbalized understanding and had no further questions.    History of Present Illness:  Radha Abbasi is a 34 y.o. female who presents for a follow up office visit in  regards to low back pain radiating down posterior RLE to the foot. Since last visit pain she attended PT and got MRI Lumbar spine w/o contrast.     Tried Meloxicam 15mg daily PRN with mild relief.    She describes the pain as intermittent pins and needles, numbness of moderate severity. Pain is rated 3/10 and interferes with daily activities.    Diagnostic studies include MRI Lumbar Spine w/o contrast with L5/S1 diffuse disc bulge, no significant central or neural foraminal narrowing. I have personally reviewed pertinent films in PACS.    Pain is causing significant functional limitation resulting in diminished quality of life and impaired age appropriate ADLs.  Denies fever, chills, numbness/tingling, bowel/bladder dysfunction, motor weakness.    I have personally reviewed and/or updated the patient's past medical history, past surgical history, family history, social history, current medications, allergies, and vital signs today.     Review of Systems   Constitutional:  Negative for chills and fever.   HENT:  Negative for ear pain and sore throat.    Eyes:  Negative for pain and visual disturbance.   Respiratory:  Negative for cough and shortness of breath.    Cardiovascular:  Negative for chest pain and palpitations.   Gastrointestinal:  Negative for abdominal pain and vomiting.   Genitourinary:  Negative for dysuria and hematuria.   Musculoskeletal:  Positive for arthralgias, back pain, gait problem and myalgias.   Skin:  Negative for color change and rash.   Neurological:  Positive for weakness and numbness. Negative for seizures and syncope.   All other systems reviewed and are negative.      Patient Active Problem List   Diagnosis    Weight gain    Maternal care due to low transverse uterine scar from previous  delivery    Transverse presentation, antepartum, other fetus    Status post repeat low transverse  section    Lipoma of head    Muscle strain    Wrist lump, right    Right hand pain     Scalp cyst    Ganglion of right wrist    Increased appetite    Low hemoglobin    Cervicalgia    Upper back pain on right side    Other hyperlipidemia    Tonsillar exudate    Blepharitis of left upper eyelid       Past Medical History:   Diagnosis Date    Maternal excessive weight gain     57 lbs    Pruritic urticarial papules and plaques of pregnancy (puppp) 2017    Varicella w/o complication childhood    unsure    Wears glasses        Past Surgical History:   Procedure Laterality Date    NM  DELIVERY ONLY N/A 2017    Procedure:  SECTION ();  Surgeon: Maty Peace MD;  Location: [facility];  Service: Obstetrics    NM  DELIVERY ONLY N/A 2020    Procedure:  SECTION () REPEAT;  Surgeon: Robe Escobedo MD;  Location: [facility] ;  Service: Obstetrics    WISDOM TOOTH EXTRACTION         Family History   Problem Relation Age of Onset    Hyperlipidemia Mother     Hypertension Mother     Arthritis Mother     Diabetes type II Mother     Hyperlipidemia Father     Diabetes type II Sister     Diabetes type II Sister     No Known Problems Sister     Seizures Brother     No Known Problems Brother     No Known Problems Brother     No Known Problems Brother     No Known Problems Brother     No Known Problems Maternal Grandmother     Lung cancer Maternal Grandfather     No Known Problems Paternal Grandmother     Lung cancer Paternal Grandfather     No Known Problems Son     No Known Problems Son        Social History     Occupational History    Not on file   Tobacco Use    Smoking status: Never    Smokeless tobacco: Never   Vaping Use    Vaping status: Never Used   Substance and Sexual Activity    Alcohol use: No    Drug use: No    Sexual activity: Yes     Partners: Male     Birth control/protection: None       Current Outpatient Medications on File Prior to Visit   Medication Sig    calamine lotion Apply topically as needed for itching    Calcium Carbonate-Vitamin  "D (CALCIUM 500/D PO) Take by mouth    diphenhydrAMINE (BENADRYL) 2 % cream Apply topically 3 (three) times a day as needed for itching    hydrocortisone 2.5 % ointment Apply topically 2 (two) times a day    cetirizine (ZyrTEC) 10 mg tablet TAKE 1 TABLET BY MOUTH EVERY MORNING FOR 14 DAYS (Patient not taking: Reported on 4/16/2025)    Diclofenac Sodium (VOLTAREN) 1 % Apply 2 g topically 4 (four) times a day (Patient not taking: Reported on 4/16/2025)    fluticasone (FLONASE) 50 mcg/act nasal spray 2 SPRAYS INTO EACH NOSTRIL 4 (FOUR) TIMES A DAY AS NEEDED FOR RHINITIS OR ALLERGIES (Patient not taking: Reported on 4/16/2025)    meloxicam (MOBIC) 15 mg tablet Take 1 tablet (15 mg total) by mouth daily as needed for moderate pain    Multiple Vitamin (MULTIVITAMIN) capsule Take 1 capsule by mouth daily (Patient not taking: Reported on 7/16/2024)    naproxen (EC NAPROSYN) 375 MG TBEC Take 1 tablet (375 mg total) by mouth 2 (two) times a day with meals for 7 days    Omega-3 Fatty Acids (FISH OIL BURP-LESS PO) Take by mouth (Patient not taking: Reported on 7/16/2024)    sertraline (ZOLOFT) 100 mg tablet Take 100 mg by mouth every morning (Patient not taking: Reported on 4/16/2025)     No current facility-administered medications on file prior to visit.       No Known Allergies    Physical Exam:    Ht 5' 4\" (1.626 m)   Wt 76.9 kg (169 lb 8.5 oz)   BMI 29.10 kg/m²     Constitutional: normal, well developed, well nourished, alert, in no distress and non-toxic and no overt pain behavior.  HEENT: grossly intact  Neck: supple, symmetric, trachea midline and no masses   Pulmonary:even and unlabored  Cardiovascular:No edema or pitting edema present  Skin:Normal without rashes or lesions and well hydrated  Psychiatric:Mood and affect appropriate  Neurologic:Cranial Nerves II-XII grossly intact      Lumbar Musculoskeletal and Neurologic Exam:                         Inspection: no atrophy of the LE musculature                    "   Gait: able to heel- and toe-walk; non-antalgic                      ROM: limited AROM of the lumbar spine in all planes; FROM at bilateral hips          Sensation: SILT bilateral L2-S1 dermatomes                      Strength: 5/5 BLE                          Reflexes: 2+ in bilateral knees and ankles.                          Palpation: -TTP over bilateral greater trochanters and bilateral SI Joint. Diffusely tender over lumbar paraspinals                       Provocative tests:                                       Seated Slump  positive on right                  Bravo's: negative bilaterally       Imaging  MRI Lumbar Spine w/o contrast 4/8/25  VERTEBRAL BODIES:  There are 5 lumbar type vertebral bodies.  Normal alignment of the lumbar spine.  No spondylolysis or spondylolisthesis. No scoliosis.  No compression fracture.    Small hemangioma in the superior plate of L2. No compression deformity or focally suspicious marrow lesion.     SACRUM:  Normal signal within the sacrum. No evidence of insufficiency or stress fracture.     DISTAL CORD AND CONUS:  Normal size and signal within the distal cord and conus. The conus medullaris terminates at the L2 level.     PARASPINAL SOFT TISSUES:  Paraspinal soft tissues are unremarkable.     LOWER THORACIC DISC SPACES:  Normal disc height and signal.  No disc herniation, canal stenosis or foraminal narrowing.     LUMBAR DISC SPACES:     L1-L2:  Normal.     L2-L3:  Normal.     L3-L4:  Normal.     L4-L5:  Normal.     L5-S1: There is a diffuse disk bulge.  No significant central canal or neural foraminal narrowing.     OTHER FINDINGS:  None.     IMPRESSION:     There is no focal disk herniation, central canal or neural foraminal narrowing.     Xray Lumbar Spine  FINDINGS:   There are 5 non rib bearing lumbar vertebral bodies.    There is no destructive osseous lesion.   Alignment is unremarkable.    No significant lumbar degenerative change noted.   The pedicles appear  intact.   Soft tissues are unremarkable.   IMPRESSION:   No acute osseous abnormality.

## 2025-04-21 ENCOUNTER — OFFICE VISIT (OUTPATIENT)
Dept: INTERNAL MEDICINE CLINIC | Facility: CLINIC | Age: 35
End: 2025-04-21

## 2025-04-21 VITALS
OXYGEN SATURATION: 98 % | DIASTOLIC BLOOD PRESSURE: 71 MMHG | WEIGHT: 170 LBS | BODY MASS INDEX: 29.18 KG/M2 | SYSTOLIC BLOOD PRESSURE: 106 MMHG | TEMPERATURE: 98.9 F

## 2025-04-21 DIAGNOSIS — J06.9 VIRAL UPPER RESPIRATORY ILLNESS: Primary | ICD-10-CM

## 2025-04-21 PROCEDURE — 99213 OFFICE O/P EST LOW 20 MIN: CPT | Performed by: STUDENT IN AN ORGANIZED HEALTH CARE EDUCATION/TRAINING PROGRAM

## 2025-04-21 RX ORDER — DOXYCYCLINE 100 MG/1
100 TABLET ORAL 2 TIMES DAILY
Qty: 10 TABLET | Refills: 0 | Status: SHIPPED | OUTPATIENT
Start: 2025-04-21 | End: 2025-04-26

## 2025-04-21 RX ORDER — FLUTICASONE PROPIONATE 50 MCG
1 SPRAY, SUSPENSION (ML) NASAL DAILY
Qty: 9.9 ML | Refills: 0 | Status: SHIPPED | OUTPATIENT
Start: 2025-04-21

## 2025-04-21 RX ORDER — ALBUTEROL SULFATE 90 UG/1
2 INHALANT RESPIRATORY (INHALATION) EVERY 6 HOURS PRN
Qty: 8.5 G | Refills: 0 | Status: SHIPPED | OUTPATIENT
Start: 2025-04-21

## 2025-04-21 RX ORDER — GUAIFENESIN/DEXTROMETHORPHAN 100-10MG/5
5 SYRUP ORAL 3 TIMES DAILY PRN
Qty: 118 ML | Refills: 0 | Status: SHIPPED | OUTPATIENT
Start: 2025-04-21

## 2025-04-21 NOTE — PROGRESS NOTES
Name: Radha Abbasi      : 1990      MRN: [id number]  Encounter Provider: Saundra Blackburn MD  Encounter Date: 2025   Encounter department: [facility] [place]  :  Assessment & Plan  Viral upper respiratory illness  Patient endorses fever, chills, fatigue, weakness, cough, nasal and ear congestion, postnasal drip, headache x 1 week.  Fevers are present for the first 4 days but has broke.  Otherwise, patient did not have any improvement in her symptoms.  Sick contacts in her son and mother-in-law so were both diagnosed with pneumonia.  Negative COVID and influenza.  On physical exam, patient is ill-appearing with frequent cough.  Lungs are clear to auscultation.  Suspect viral URI but in setting of persistent symptoms, will treat with antibiotics and with a chest x-ray along with symptom management.  Patient advised to return with worsening or lack of improvement of symptoms.      Orders:    XR chest pa and lateral; Future    dextromethorphan-guaiFENesin (ROBITUSSIN DM)  mg/5 mL syrup; Take 5 mL by mouth 3 (three) times a day as needed for cough    fluticasone (FLONASE) 50 mcg/act nasal spray; 1 spray into each nostril daily    doxycycline (ADOXA) 100 MG tablet; Take 1 tablet (100 mg total) by mouth 2 (two) times a day for 5 days    albuterol (ProAir HFA) 90 mcg/act inhaler; Inhale 2 puffs every 6 (six) hours as needed for wheezing        BMI Counseling: Body mass index is 29.18 kg/m². The BMI is above normal. Nutrition recommendations include decreasing portion sizes, decreasing fast food intake and moderation in carbohydrate intake. Exercise recommendations include exercising 3-5 times per week. Rationale for BMI follow-up plan is due to patient being overweight or obese.       History of Present Illness    #[id number]    Ms. Radha Abbasi is a 34-year-old female who presents today for same day sick appointment. Patient is accompanied by  and 2  sons.  Patient endorses initial symptoms that started 1 week ago.  Patient endorses fever of 101 to 102 °F for the first 4 days but has since broke.  She also endorses cough, weakness, fatigue, congestion, sore throat, ear congestion, increased phlegm.  She has been unable to cough up the mucus.  She states that she does not feel any better compared to 1 week ago, other than she is no longer having fevers.  Patient endorses multiple sick contacts such as her oldest son and mother-in-law.  They were both diagnosed with pneumonia in the ED and provided antibiotics.  Testing did not reveal diagnosis of influenza or COVID.  They are both feeling better now.  Patient states she has been taking Tylenol but not taking any cold or flu medications.  She also endorses nausea when she coughs and the feeling she needs to vomit.  She did not have any episodes of emesis.  Patient presents today due to the lack of improvement of her symptoms.       Review of Systems   Constitutional:  Positive for appetite change, chills, fatigue and fever. Negative for unexpected weight change.   HENT:  Positive for congestion, ear pain, postnasal drip, rhinorrhea and sore throat.    Respiratory:  Positive for cough. Negative for chest tightness, shortness of breath and wheezing.    Cardiovascular:  Negative for chest pain, palpitations and leg swelling.   Gastrointestinal:  Positive for nausea. Negative for constipation, diarrhea and vomiting.   Genitourinary:  Negative for dysuria.   Neurological:  Positive for weakness and headaches. Negative for dizziness and light-headedness.   All other systems reviewed and are negative.      Objective   /71 (BP Location: Left arm, Patient Position: Sitting, Cuff Size: Standard)   Temp 98.9 °F (37.2 °C) (Temporal)   Wt 77.1 kg (170 lb)   SpO2 98%   BMI 29.18 kg/m²      Physical Exam  Vitals reviewed.   Constitutional:       General: She is not in acute distress.     Appearance: She is  ill-appearing. She is not diaphoretic.   HENT:      Head: Normocephalic and atraumatic.      Nose: Congestion present.      Mouth/Throat:      Mouth: Mucous membranes are moist.      Pharynx: Oropharynx is clear. No oropharyngeal exudate or posterior oropharyngeal erythema.   Eyes:      Conjunctiva/sclera: Conjunctivae normal.   Cardiovascular:      Rate and Rhythm: Regular rhythm. Tachycardia present.      Heart sounds: Normal heart sounds.   Pulmonary:      Effort: Pulmonary effort is normal. No respiratory distress.      Breath sounds: Normal breath sounds. No wheezing.   Abdominal:      General: Abdomen is flat.   Musculoskeletal:         General: Normal range of motion.      Cervical back: Normal range of motion.      Right lower leg: No edema.      Left lower leg: No edema.   Skin:     General: Skin is warm.      Capillary Refill: Capillary refill takes less than 2 seconds.      Coloration: Skin is pale. Skin is not jaundiced.   Neurological:      Mental Status: She is alert and oriented to person, place, and time.   Psychiatric:         Mood and Affect: Mood normal.         Saundra Blackburn MD, MPH  [facility] Internal Medicine Residency PGY-3

## 2025-05-01 ENCOUNTER — OFFICE VISIT (OUTPATIENT)
Dept: INTERNAL MEDICINE CLINIC | Facility: CLINIC | Age: 35
End: 2025-05-01

## 2025-05-01 VITALS
OXYGEN SATURATION: 98 % | SYSTOLIC BLOOD PRESSURE: 105 MMHG | BODY MASS INDEX: 27.94 KG/M2 | WEIGHT: 162.8 LBS | HEART RATE: 89 BPM | TEMPERATURE: 98 F | DIASTOLIC BLOOD PRESSURE: 71 MMHG

## 2025-05-01 DIAGNOSIS — R19.7 DIARRHEA OF PRESUMED INFECTIOUS ORIGIN: Primary | ICD-10-CM

## 2025-05-01 DIAGNOSIS — R10.30 LOWER ABDOMINAL PAIN: ICD-10-CM

## 2025-05-01 LAB
SL AMB  POCT GLUCOSE, UA: NEGATIVE
SL AMB LEUKOCYTE ESTERASE,UA: NEGATIVE
SL AMB POCT BILIRUBIN,UA: NEGATIVE
SL AMB POCT BLOOD,UA: NEGATIVE
SL AMB POCT CLARITY,UA: NORMAL
SL AMB POCT COLOR,UA: YELLOW
SL AMB POCT KETONES,UA: NEGATIVE
SL AMB POCT NITRITE,UA: NORMAL
SL AMB POCT PH,UA: 6
SL AMB POCT SPECIFIC GRAVITY,UA: 1.03
SL AMB POCT URINE PROTEIN: NEGATIVE
SL AMB POCT UROBILINOGEN: NEGATIVE

## 2025-05-01 RX ORDER — HYOSCYAMINE SULFATE 0.12 MG/1
0.12 TABLET ORAL EVERY 6 HOURS PRN
Qty: 15 TABLET | Refills: 0 | Status: SHIPPED | OUTPATIENT
Start: 2025-05-01

## 2025-05-01 NOTE — ASSESSMENT & PLAN NOTE
Orders:    Clostridioides difficile toxin by PCR with EIA; Future    POCT urine dip auto non-scope    hyoscyamine (ANASPAZ,LEVSIN) 0.125 MG tablet; Take 1 tablet (0.125 mg total) by mouth every 6 (six) hours as needed for cramping

## 2025-05-01 NOTE — PROGRESS NOTES
Name: Radha Abbasi      : 1990      MRN: [id number]  Encounter Provider: Crystal Johnson MD  Encounter Date: 2025   Encounter department: [facility]    Assessment & Plan  Lower abdominal pain    Orders:    Clostridioides difficile toxin by PCR with EIA; Future    POCT urine dip auto non-scope    hyoscyamine (ANASPAZ,LEVSIN) 0.125 MG tablet; Take 1 tablet (0.125 mg total) by mouth every 6 (six) hours as needed for cramping    Diarrhea of presumed infectious origin  Seed detailed HPI hx exam suggest likely c diff infection ,she has had sx x 7 days had started course of doxycyline on  stopped taking it after 3 1/2 days due to onset of abdominal pain and diarrhea . She continue to have sx lower abdominal pain , cramping diarrhea 3-4 x per day -F - blood - urinary sx , she has been trying to follow bland diet , eating some probiotic plain yogurt , reviewed bland diet light broth , tea , potato, rice plain toast , ok for plain yogurt , UA in office neg ketones , check stool for C dif , start levsin sl up to 1 6 hr await stool cx   Orders:    Clostridioides difficile toxin by PCR with EIA; Future    POCT urine dip auto non-scope    hyoscyamine (ANASPAZ,LEVSIN) 0.125 MG tablet; Take 1 tablet (0.125 mg total) by mouth every 6 (six) hours as needed for cramping         History of Present Illness     HPI Patient here for same day appointment , interpretor #  [id number] present during office visit , she has had diarrhea x 7 days   - Fever - urinary sx , occasional nausea - V  + abdominal pain recent travel , + recent po antibiotics , had OV here  URI given adox she took 3 1/2 days , she stopped taking it she started to have the loose stools and   Thus stopped the medication  She does have  abdominal pain pre having BM and afterwards + N - V she hears loud noise in her abdomen , diarrhea 4-5 x per day , - blood - dark color she has been trying to eat a lighter diet ,  boiled potatoes , yogurt She has not taken any otc meds   Review of Systems   Constitutional:  Negative for chills and fever.   HENT:  Negative for ear pain and sore throat.    Eyes:  Negative for pain and visual disturbance.   Respiratory:  Negative for cough and shortness of breath.    Cardiovascular:  Negative for chest pain and palpitations.   Gastrointestinal:  Positive for abdominal pain and diarrhea. Negative for vomiting.   Genitourinary:  Negative for dysuria and hematuria.   Musculoskeletal:  Negative for arthralgias and back pain.   Skin:  Negative for color change and rash.   Neurological:  Negative for seizures and syncope.   All other systems reviewed and are negative.    Past Medical History:   Diagnosis Date    Maternal excessive weight gain     57 lbs    Pruritic urticarial papules and plaques of pregnancy (puppp) 2017    Varicella w/o complication childhood    unsure    Wears glasses      Past Surgical History:   Procedure Laterality Date    PA  DELIVERY ONLY N/A 2017    Procedure:  SECTION ();  Surgeon: Maty Peace MD;  Location: [facility] ;  Service: Obstetrics    PA  DELIVERY ONLY N/A 2020    Procedure:  SECTION () REPEAT;  Surgeon: Robe Escobedo MD;  Location: [facility];  Service: Obstetrics    WISDOM TOOTH EXTRACTION       Family History   Problem Relation Age of Onset    Hyperlipidemia Mother     Hypertension Mother     Arthritis Mother     Diabetes type II Mother     Hyperlipidemia Father     Diabetes type II Sister     Diabetes type II Sister     No Known Problems Sister     Seizures Brother     No Known Problems Brother     No Known Problems Brother     No Known Problems Brother     No Known Problems Brother     No Known Problems Maternal Grandmother     Lung cancer Maternal Grandfather     No Known Problems Paternal Grandmother     Lung cancer Paternal Grandfather     No Known Problems Son     No Known Problems Son       Social History     Tobacco Use    Smoking status: Never    Smokeless tobacco: Never   Vaping Use    Vaping status: Never Used   Substance and Sexual Activity    Alcohol use: No    Drug use: No    Sexual activity: Yes     Partners: Male     Birth control/protection: None     Current Outpatient Medications on File Prior to Visit   Medication Sig    albuterol (ProAir HFA) 90 mcg/act inhaler Inhale 2 puffs every 6 (six) hours as needed for wheezing    calamine lotion Apply topically as needed for itching    Calcium Carbonate-Vitamin D (CALCIUM 500/D PO) Take by mouth    cetirizine (ZyrTEC) 10 mg tablet TAKE 1 TABLET BY MOUTH EVERY MORNING FOR 14 DAYS (Patient not taking: Reported on 4/16/2025)    dextromethorphan-guaiFENesin (ROBITUSSIN DM)  mg/5 mL syrup Take 5 mL by mouth 3 (three) times a day as needed for cough    Diclofenac Sodium (VOLTAREN) 1 % Apply 2 g topically 4 (four) times a day (Patient not taking: Reported on 4/16/2025)    diphenhydrAMINE (BENADRYL) 2 % cream Apply topically 3 (three) times a day as needed for itching    fluticasone (FLONASE) 50 mcg/act nasal spray 1 spray into each nostril daily    gabapentin (NEURONTIN) 100 mg capsule Gabapentin 100 QHSx3 days, then BIDx3 days, then TID after.    hydrocortisone 2.5 % ointment Apply topically 2 (two) times a day    meloxicam (MOBIC) 15 mg tablet Take 1 tablet (15 mg total) by mouth daily as needed for moderate pain    Multiple Vitamin (MULTIVITAMIN) capsule Take 1 capsule by mouth daily (Patient not taking: Reported on 7/16/2024)    naproxen (EC NAPROSYN) 375 MG TBEC Take 1 tablet (375 mg total) by mouth 2 (two) times a day with meals for 7 days    Omega-3 Fatty Acids (FISH OIL BURP-LESS PO) Take by mouth (Patient not taking: Reported on 7/16/2024)    sertraline (ZOLOFT) 100 mg tablet Take 100 mg by mouth every morning (Patient not taking: Reported on 4/16/2025)     No Known Allergies  Immunization History   Administered Date(s) Administered     COVID-19 PFIZER VACCINE 0.3 ML IM 04/22/2021, 05/14/2021    DTaP 01/12/2017    Tdap 06/12/2017, 04/15/2020     Objective   /71 (BP Location: Left arm, Patient Position: Sitting, Cuff Size: Standard)   Pulse 89   Temp 98 °F (36.7 °C) (Temporal)   Wt 73.8 kg (162 lb 12.8 oz)   SpO2 98%   BMI 27.94 kg/m²     Physical Exam  Vitals and nursing note reviewed.   Constitutional:       General: She is not in acute distress.     Appearance: She is well-developed.      Comments: Skin with good color turgor , well hydrated ,no distress noted     HENT:      Head: Normocephalic and atraumatic.      Right Ear: No decreased hearing noted. No middle ear effusion. There is no impacted cerumen.      Left Ear: No decreased hearing noted.  No middle ear effusion. There is no impacted cerumen.      Mouth/Throat:      Pharynx: Oropharynx is clear.   Eyes:      Conjunctiva/sclera: Conjunctivae normal.   Neck:      Thyroid: No thyromegaly.   Cardiovascular:      Rate and Rhythm: Normal rate and regular rhythm.      Heart sounds: Normal heart sounds. No murmur heard.  Pulmonary:      Effort: Pulmonary effort is normal. No respiratory distress.      Breath sounds: Normal breath sounds.   Abdominal:      General: Bowel sounds are increased.      Palpations: Abdomen is soft.      Tenderness: There is no abdominal tenderness. There is no right CVA tenderness, left CVA tenderness, guarding or rebound.   Musculoskeletal:         General: No swelling.      Cervical back: Neck supple.   Lymphadenopathy:      Cervical:      Right cervical: No superficial cervical adenopathy.     Left cervical: No superficial cervical adenopathy.   Skin:     General: Skin is warm and dry.      Capillary Refill: Capillary refill takes less than 2 seconds.   Neurological:      Mental Status: She is alert.      Comments: Non focal exam   Psychiatric:         Attention and Perception: Attention normal.         Mood and Affect: Mood normal.         Speech:  Speech normal.         Behavior: Behavior normal.         Thought Content: Thought content normal.

## 2025-05-01 NOTE — ASSESSMENT & PLAN NOTE
Seed detailed HPI hx exam suggest likely c diff infection ,she has had sx x 7 days had started course of doxycyline on 4/21 stopped taking it after 3 1/2 days due to onset of abdominal pain and diarrhea . She continue to have sx lower abdominal pain , cramping diarrhea 3-4 x per day -F - blood - urinary sx , she has been trying to follow bland diet , eating some probiotic plain yogurt , reviewed bland diet light broth , tea , potato, rice plain toast , ok for plain yogurt , UA in office neg ketones , check stool for C dif , start levsin sl up to 1 6 hr await stool cx   Orders:    Clostridioides difficile toxin by PCR with EIA; Future    POCT urine dip auto non-scope    hyoscyamine (ANASPAZ,LEVSIN) 0.125 MG tablet; Take 1 tablet (0.125 mg total) by mouth every 6 (six) hours as needed for cramping

## 2025-05-02 ENCOUNTER — APPOINTMENT (OUTPATIENT)
Dept: LAB | Facility: CLINIC | Age: 35
End: 2025-05-02
Attending: FAMILY MEDICINE
Payer: COMMERCIAL

## 2025-05-02 DIAGNOSIS — R19.7 DIARRHEA OF PRESUMED INFECTIOUS ORIGIN: ICD-10-CM

## 2025-05-02 DIAGNOSIS — R10.30 LOWER ABDOMINAL PAIN: ICD-10-CM

## 2025-05-02 PROCEDURE — 87493 C DIFF AMPLIFIED PROBE: CPT

## 2025-05-03 LAB — C DIFF TOX GENS STL QL NAA+PROBE: NEGATIVE

## 2025-05-05 ENCOUNTER — TELEPHONE (OUTPATIENT)
Dept: INTERNAL MEDICINE CLINIC | Facility: CLINIC | Age: 35
End: 2025-05-05

## 2025-05-05 NOTE — TELEPHONE ENCOUNTER
Prior authorization initiated for patient's Hyoscyamine Sulfate tablets on Cover my Meds.     Pending review.     Key: [id number]

## 2025-05-06 ENCOUNTER — RESULTS FOLLOW-UP (OUTPATIENT)
Dept: INTERNAL MEDICINE CLINIC | Facility: CLINIC | Age: 35
End: 2025-05-06

## 2025-05-06 NOTE — TELEPHONE ENCOUNTER
Prior authorization denied for patient's Hyoscyamine tablets.     Per insurance, patient must try Loperamide 2 mg tablets or capsules (preferred drug on formulary)    Please review and advise.

## 2025-05-08 NOTE — TELEPHONE ENCOUNTER
"Patient's  called back to give update on patient. He said \"she is no longer having diarrhea and no further testing is necessary.\" I informed clinical team and they had no other questions.   "

## 2025-05-14 DIAGNOSIS — J06.9 VIRAL UPPER RESPIRATORY ILLNESS: ICD-10-CM

## 2025-05-15 RX ORDER — ALBUTEROL SULFATE 90 UG/1
INHALANT RESPIRATORY (INHALATION)
Qty: 6.7 G | Refills: 1 | Status: SHIPPED | OUTPATIENT
Start: 2025-05-15

## 2025-06-18 DIAGNOSIS — J06.9 VIRAL UPPER RESPIRATORY ILLNESS: ICD-10-CM

## 2025-06-18 RX ORDER — FLUTICASONE PROPIONATE 50 MCG
SPRAY, SUSPENSION (ML) NASAL
Qty: 16 ML | Refills: 3 | Status: SHIPPED | OUTPATIENT
Start: 2025-06-18

## (undated) DEVICE — SUT VICRYL 0 CTX 36 IN J978H

## (undated) DEVICE — ABDOMINAL PAD: Brand: DERMACEA

## (undated) DEVICE — PACK C-SECTION PBDS

## (undated) DEVICE — GLOVE INDICATOR PI UNDERGLOVE SZ 6.5 BLUE

## (undated) DEVICE — GLOVE SRG BIOGEL ECLIPSE 6.5

## (undated) DEVICE — SKIN MARKER DUAL TIP WITH RULER CAP, FLEXIBLE RULER AND LABELS: Brand: DEVON

## (undated) DEVICE — GAUZE SPONGES,16 PLY: Brand: CURITY

## (undated) DEVICE — TELFA NON-ADHERENT ABSORBENT DRESSING: Brand: TELFA

## (undated) DEVICE — Device

## (undated) DEVICE — SUT VICRYL 0 CT-1 36 IN J946H

## (undated) DEVICE — MEDI-VAC YANKAUER SUCTION HANDLE W/STRAIGHT TIP & CONTROL VENT: Brand: CARDINAL HEALTH

## (undated) DEVICE — SUT PLAIN 2-0 CTX 27 IN 872H

## (undated) DEVICE — SUT VICRYL 0 CT-1 27 IN J260H

## (undated) DEVICE — SUT MONOCRYL 3-0 CT-1 27 IN Y338H

## (undated) DEVICE — ABG MICROSTICKS SAFETY

## (undated) DEVICE — CHLORAPREP HI-LITE 10.5ML ORANGE

## (undated) DEVICE — SCD SEQUENTIAL COMPRESSION COMFORT SLEEVE MEDIUM KNEE LENGTH: Brand: KENDALL SCD

## (undated) DEVICE — CHLORAPREP HI-LITE 26ML ORANGE

## (undated) DEVICE — SUT VICRYL 4-0 KS 27 IN J662H

## (undated) DEVICE — GLOVE SRG BIOGEL ECLIPSE 8

## (undated) DEVICE — SUT MONOCRYL 0 CTX 36 IN Y398H

## (undated) DEVICE — SUT MONOCRYL 4-0 PS-2 27 IN Y426H